# Patient Record
Sex: MALE | Race: WHITE | NOT HISPANIC OR LATINO | Employment: OTHER | URBAN - METROPOLITAN AREA
[De-identification: names, ages, dates, MRNs, and addresses within clinical notes are randomized per-mention and may not be internally consistent; named-entity substitution may affect disease eponyms.]

---

## 2021-04-23 ENCOUNTER — APPOINTMENT (EMERGENCY)
Dept: RADIOLOGY | Facility: HOSPITAL | Age: 51
End: 2021-04-23
Payer: COMMERCIAL

## 2021-04-23 ENCOUNTER — HOSPITAL ENCOUNTER (EMERGENCY)
Facility: HOSPITAL | Age: 51
Discharge: HOME | End: 2021-04-23
Attending: EMERGENCY MEDICINE | Admitting: EMERGENCY MEDICINE
Payer: COMMERCIAL

## 2021-04-23 VITALS
DIASTOLIC BLOOD PRESSURE: 86 MMHG | WEIGHT: 180 LBS | TEMPERATURE: 98.4 F | RESPIRATION RATE: 17 BRPM | HEIGHT: 66 IN | BODY MASS INDEX: 28.93 KG/M2 | OXYGEN SATURATION: 96 % | SYSTOLIC BLOOD PRESSURE: 144 MMHG | HEART RATE: 89 BPM

## 2021-04-23 DIAGNOSIS — I10 HYPERTENSION: ICD-10-CM

## 2021-04-23 DIAGNOSIS — R73.9 HYPERGLYCEMIA: Primary | ICD-10-CM

## 2021-04-23 DIAGNOSIS — IMO0002 UNCONTROLLED DIABETES MELLITUS: ICD-10-CM

## 2021-04-23 LAB
ALBUMIN SERPL BCP-MCNC: 4.1 G/DL (ref 3.4–4.8)
ALP SERPL-CCNC: 82.1 U/L (ref 10–129)
ALT SERPL W P-5'-P-CCNC: 42 U/L (ref 5–63)
ANION GAP SERPL CALCULATED.3IONS-SCNC: 6 MMOL/L (ref 4–13)
AST SERPL W P-5'-P-CCNC: 16 U/L (ref 15–41)
ATRIAL RATE: 90 BPM
BASE EX.OXY STD BLDV CALC-SCNC: 76.5 % (ref 60–80)
BASE EXCESS BLDV CALC-SCNC: 0.4 MMOL/L
BETA-HYDROXYBUTYRATE: 0.1 MMOL/L
BILIRUB SERPL-MCNC: 0.42 MG/DL (ref 0.3–1.2)
BILIRUB UR QL STRIP: NEGATIVE
BUN SERPL-MCNC: 17 MG/DL (ref 6–20)
CALCIUM SERPL-MCNC: 9.1 MG/DL (ref 8.4–10.2)
CHLORIDE SERPL-SCNC: 100 MMOL/L (ref 96–108)
CLARITY UR: CLEAR
CO2 SERPL-SCNC: 28 MMOL/L (ref 22–33)
COLOR UR: YELLOW
CREAT SERPL-MCNC: 0.76 MG/DL (ref 0.5–1.2)
ERYTHROCYTE [DISTWIDTH] IN BLOOD BY AUTOMATED COUNT: 12.5 % (ref 11.6–15.1)
GFR SERPL CREATININE-BSD FRML MDRD: 105 ML/MIN/1.73SQ M
GLUCOSE SERPL-MCNC: 144 MG/DL (ref 65–140)
GLUCOSE SERPL-MCNC: 323 MG/DL (ref 65–140)
GLUCOSE SERPL-MCNC: 334 MG/DL (ref 65–140)
GLUCOSE UR STRIP-MCNC: ABNORMAL MG/DL
HCO3 BLDV-SCNC: 25.9 MMOL/L (ref 24–30)
HCT VFR BLD AUTO: 49.8 % (ref 36.5–49.3)
HGB BLD-MCNC: 17.7 G/DL (ref 12–17)
HGB UR QL STRIP.AUTO: NEGATIVE
KETONES UR STRIP-MCNC: NEGATIVE MG/DL
LEUKOCYTE ESTERASE UR QL STRIP: NEGATIVE
MAGNESIUM SERPL-MCNC: 1.8 MG/DL (ref 1.6–2.6)
MCH RBC QN AUTO: 30.5 PG (ref 26.8–34.3)
MCHC RBC AUTO-ENTMCNC: 35.5 G/DL (ref 31.4–37.4)
MCV RBC AUTO: 86 FL (ref 82–98)
NITRITE UR QL STRIP: NEGATIVE
O2 CT BLDV-SCNC: 19.2 ML/DL
P AXIS: 55 DEGREES
PCO2 BLDV: 44.4 MM HG (ref 42–50)
PH BLDV: 7.38 [PH] (ref 7.3–7.4)
PH UR STRIP.AUTO: 6.5 [PH]
PLATELET # BLD AUTO: 164 THOUSANDS/UL (ref 149–390)
PMV BLD AUTO: 9.7 FL (ref 8.9–12.7)
PO2 BLDV: 42.1 MM HG (ref 35–45)
POTASSIUM SERPL-SCNC: 4.2 MMOL/L (ref 3.5–5)
PR INTERVAL: 168 MS
PROT SERPL-MCNC: 6.3 G/DL (ref 6.4–8.3)
PROT UR STRIP-MCNC: NEGATIVE MG/DL
QRS AXIS: 48 DEGREES
QRSD INTERVAL: 85 MS
QT INTERVAL: 354 MS
QTC INTERVAL: 433 MS
RBC # BLD AUTO: 5.8 MILLION/UL (ref 3.88–5.62)
SODIUM SERPL-SCNC: 134 MMOL/L (ref 133–145)
SP GR UR STRIP.AUTO: 1.02 (ref 1–1.03)
T WAVE AXIS: 76 DEGREES
TROPONIN I SERPL-MCNC: <0.03 NG/ML (ref 0–0.07)
UROBILINOGEN UR QL STRIP.AUTO: 0.2 E.U./DL
VENTRICULAR RATE: 90 BPM
WBC # BLD AUTO: 6.91 THOUSAND/UL (ref 4.31–10.16)

## 2021-04-23 PROCEDURE — 83036 HEMOGLOBIN GLYCOSYLATED A1C: CPT | Performed by: PHYSICIAN ASSISTANT

## 2021-04-23 PROCEDURE — 81003 URINALYSIS AUTO W/O SCOPE: CPT | Performed by: PHYSICIAN ASSISTANT

## 2021-04-23 PROCEDURE — 96361 HYDRATE IV INFUSION ADD-ON: CPT

## 2021-04-23 PROCEDURE — 85027 COMPLETE CBC AUTOMATED: CPT | Performed by: PHYSICIAN ASSISTANT

## 2021-04-23 PROCEDURE — 99285 EMERGENCY DEPT VISIT HI MDM: CPT | Performed by: PHYSICIAN ASSISTANT

## 2021-04-23 PROCEDURE — 36415 COLL VENOUS BLD VENIPUNCTURE: CPT | Performed by: PHYSICIAN ASSISTANT

## 2021-04-23 PROCEDURE — 84484 ASSAY OF TROPONIN QUANT: CPT | Performed by: PHYSICIAN ASSISTANT

## 2021-04-23 PROCEDURE — 82948 REAGENT STRIP/BLOOD GLUCOSE: CPT

## 2021-04-23 PROCEDURE — 83735 ASSAY OF MAGNESIUM: CPT | Performed by: PHYSICIAN ASSISTANT

## 2021-04-23 PROCEDURE — 82805 BLOOD GASES W/O2 SATURATION: CPT | Performed by: PHYSICIAN ASSISTANT

## 2021-04-23 PROCEDURE — 99285 EMERGENCY DEPT VISIT HI MDM: CPT

## 2021-04-23 PROCEDURE — 82010 KETONE BODYS QUAN: CPT | Performed by: PHYSICIAN ASSISTANT

## 2021-04-23 PROCEDURE — 93005 ELECTROCARDIOGRAM TRACING: CPT

## 2021-04-23 PROCEDURE — 80053 COMPREHEN METABOLIC PANEL: CPT | Performed by: PHYSICIAN ASSISTANT

## 2021-04-23 PROCEDURE — 93010 ELECTROCARDIOGRAM REPORT: CPT | Performed by: INTERNAL MEDICINE

## 2021-04-23 PROCEDURE — 71045 X-RAY EXAM CHEST 1 VIEW: CPT

## 2021-04-23 PROCEDURE — 96374 THER/PROPH/DIAG INJ IV PUSH: CPT

## 2021-04-23 RX ORDER — SODIUM CHLORIDE 9 MG/ML
500 INJECTION, SOLUTION INTRAVENOUS CONTINUOUS
Status: DISCONTINUED | OUTPATIENT
Start: 2021-04-23 | End: 2021-04-23 | Stop reason: HOSPADM

## 2021-04-23 RX ORDER — SODIUM CHLORIDE 9 MG/ML
2000 INJECTION, SOLUTION INTRAVENOUS CONTINUOUS
Status: DISPENSED | OUTPATIENT
Start: 2021-04-23 | End: 2021-04-23

## 2021-04-23 RX ORDER — SODIUM CHLORIDE 9 MG/ML
3 INJECTION INTRAVENOUS
Status: DISCONTINUED | OUTPATIENT
Start: 2021-04-23 | End: 2021-04-23 | Stop reason: HOSPADM

## 2021-04-23 RX ORDER — SODIUM CHLORIDE 9 MG/ML
250 INJECTION, SOLUTION INTRAVENOUS CONTINUOUS
Status: DISCONTINUED | OUTPATIENT
Start: 2021-04-23 | End: 2021-04-23 | Stop reason: HOSPADM

## 2021-04-23 RX ORDER — LOSARTAN POTASSIUM 25 MG/1
25 TABLET ORAL DAILY
Qty: 30 TABLET | Refills: 0 | Status: SHIPPED | OUTPATIENT
Start: 2021-04-23 | End: 2021-05-23

## 2021-04-23 RX ADMIN — INSULIN HUMAN 6 UNITS: 100 INJECTION, SOLUTION PARENTERAL at 15:29

## 2021-04-23 RX ADMIN — SODIUM CHLORIDE 2000 ML/HR: 0.9 INJECTION, SOLUTION INTRAVENOUS at 15:26

## 2021-04-23 NOTE — ED PROVIDER NOTES
History  Chief Complaint   Patient presents with    Hyperglycemia - Symptomatic     Patient presents with hyperglycemia, states his blood sugar was over 500 when he checked it at 2pm today, complaining of blurred vision and dry mouth  Patient no longer takes oral medication for diabetes at this time, only checked his blood sugar due to the symptoms     62-year-old male comes in today complaining of hyperglycemia  He reports that he has not taken any of his medication in years  For the past 3 days or so he has been feeling short of breath  He has been having polyuria, polydipsia, and occasional blurred vision  He has been having burning in his toes  He feels like his mouth is very dry  He has a history of diabetes, AFib, hypertension, prior stroke  Denies any history DVT  He smokes less than a pack per day x20 years          None       Past Medical History:   Diagnosis Date    A-fib ([facility] )     Diabetes mellitus ([facility] )     Hypertension     Stroke [facility])        Past Surgical History:   Procedure Laterality Date    APPENDECTOMY      CHOLECYSTECTOMY         History reviewed  No pertinent family history  I have reviewed and agree with the history as documented  E-Cigarette/Vaping    E-Cigarette Use Never User      E-Cigarette/Vaping Substances     Social History     Tobacco Use    Smoking status: Current Every Day Smoker     Packs/day: 0 25    Smokeless tobacco: Never Used   Substance Use Topics    Alcohol use: Yes     Frequency: Monthly or less     Drinks per session: 1 or 2    Drug use: Never       Review of Systems   Constitutional: Negative for fatigue and fever  HENT: Negative for ear pain, rhinorrhea and sore throat  Eyes: Negative for visual disturbance  Respiratory: Positive for shortness of breath  Negative for cough  Cardiovascular: Negative for chest pain  Gastrointestinal: Negative for abdominal pain, diarrhea, nausea and vomiting     Endocrine: Positive for polydipsia and polyuria  Genitourinary: Negative for dysuria  Musculoskeletal: Negative for back pain  Skin: Negative for rash  Neurological: Negative for headaches  Psychiatric/Behavioral: Negative for behavioral problems  Physical Exam  Physical Exam  Constitutional:       Appearance: Normal appearance  HENT:      Head: Normocephalic and atraumatic  Nose: No rhinorrhea  Mouth/Throat:      Mouth: Mucous membranes are moist    Eyes:      Extraocular Movements: Extraocular movements intact  Conjunctiva/sclera:      Right eye: Right conjunctiva is injected  Left eye: Left conjunctiva is injected  Neck:      Musculoskeletal: Normal range of motion  Cardiovascular:      Rate and Rhythm: Regular rhythm  Tachycardia present  Pulmonary:      Effort: Pulmonary effort is normal       Breath sounds: Examination of the right-upper field reveals wheezing  Examination of the right-middle field reveals wheezing  Examination of the right-lower field reveals wheezing and rhonchi  Wheezing and rhonchi present  Abdominal:      General: Abdomen is protuberant  Bowel sounds are normal       Palpations: Abdomen is soft  Musculoskeletal: Normal range of motion  Skin:     General: Skin is warm and dry  Neurological:      General: No focal deficit present  Mental Status: He is alert     Psychiatric:         Mood and Affect: Mood normal          Behavior: Behavior normal          Vital Signs  ED Triage Vitals [04/23/21 1503]   Temperature Pulse Respirations Blood Pressure SpO2   98 4 °F (36 9 °C) 101 18 155/100 98 %      Temp Source Heart Rate Source Patient Position - Orthostatic VS BP Location FiO2 (%)   Oral Monitor Lying Left arm --      Pain Score       --           Vitals:    04/23/21 1503 04/23/21 1607   BP: 155/100 144/86   Pulse: 101 89   Patient Position - Orthostatic VS: Lying Lying         Visual Acuity      ED Medications  Medications   sodium chloride (PF) 0 9 % injection 3 mL (has no administration in time range)   sodium chloride 0 9 % infusion (2,000 mL/hr Intravenous New Bag 4/23/21 1526)     Followed by   sodium chloride 0 9 % infusion (has no administration in time range)     Followed by   sodium chloride 0 9 % infusion (has no administration in time range)   insulin regular (HumuLIN R,NovoLIN R) injection 6 Units (6 Units Intravenous Given 4/23/21 1529)       Diagnostic Studies  Results Reviewed     Procedure Component Value Units Date/Time    Fingerstick Glucose (POCT) [[id number]]  (Abnormal) Collected: 04/23/21 1632    Lab Status: Final result Updated: 04/23/21 1634     POC Glucose 144 mg/dl     UA w Reflex to Microscopic w Reflex to Culture [[id number]]  (Abnormal) Collected: 04/23/21 1537    Lab Status: Final result Specimen: Urine, Clean Catch Updated: 04/23/21 1557     Color, UA Yellow     Clarity, UA Clear     Specific Mc[place], UA 1 020     pH, UA 6 5     Leukocytes, UA Negative     Nitrite, UA Negative     Protein, UA Negative mg/dl      Glucose, UA 3+ mg/dl      Ketones, UA Negative mg/dl      Urobilinogen, UA 0 2 E U /dl      Bilirubin, UA Negative     Blood, UA Negative    Troponin I [[id number]]  (Normal) Collected: 04/23/21 1523    Lab Status: Final result Specimen: Blood from Arm, Right Updated: 04/23/21 1554     Troponin I <0 03 ng/mL     Comprehensive metabolic panel [[id number]]  (Abnormal) Collected: 04/23/21 1523    Lab Status: Final result Specimen: Blood from Arm, Right Updated: 04/23/21 1552     Sodium 134 mmol/L      Potassium 4 2 mmol/L      Chloride 100 mmol/L      CO2 28 mmol/L      ANION GAP 6 mmol/L      BUN 17 mg/dL      Creatinine 0 76 mg/dL      Glucose 334 mg/dL      Calcium 9 1 mg/dL      AST 16 U/L      ALT 42 U/L      Alkaline Phosphatase 82 1 U/L      Total Protein 6 3 g/dL      Albumin 4 1 g/dL      Total Bilirubin 0 42 mg/dL      eGFR 105 ml/min/1 73sq m     Narrative:      [place] guidelines for Chronic Kidney Disease (CKD):   Stage 1 with normal or high GFR (GFR > 90 mL/min/1 73 square meters)    Stage 2 Mild CKD (GFR = 60-89 mL/min/1 73 square meters)    Stage 3A Moderate CKD (GFR = 45-59 mL/min/1 73 square meters)    Stage 3B Moderate CKD (GFR = 30-44 mL/min/1 73 square meters)    Stage 4 Severe CKD (GFR = 15-29 mL/min/1 73 square meters)    Stage 5 End Stage CKD (GFR <15 mL/min/1 73 square meters)  Note: GFR calculation is accurate only with a steady state creatinine    Magnesium [[id number]]  (Normal) Collected: 04/23/21 1523    Lab Status: Final result Specimen: Blood from Arm, Right Updated: 04/23/21 1552     Magnesium 1 8 mg/dL     Beta Hydroxybutyrate [[id number]]  (Normal) Collected: 04/23/21 1523    Lab Status: Final result Specimen: Blood from Arm, Right Updated: 04/23/21 1538     BETA-HYDROXYBUTYRATE 0 1 mmol/L     Blood gas, venous [[id number]] Collected: 04/23/21 1523    Lab Status: Final result Specimen: Blood from Arm, Right Updated: 04/23/21 1532     pH, Hector 7 383     pCO2, Hector 44 4 mm Hg      pO2, Hector 42 1 mm Hg      HCO3, Hector 25 9 mmol/L      Base Excess, Hector 0 4 mmol/L      O2 Content, Hector 19 2 ml/dL      O2 HGB, VENOUS 76 5 %     CBC [[id number]]  (Abnormal) Collected: 04/23/21 1523    Lab Status: Final result Specimen: Blood from Arm, Right Updated: 04/23/21 1531     WBC 6 91 Thousand/uL      RBC 5 80 Million/uL      Hemoglobin 17 7 g/dL      Hematocrit 49 8 %      MCV 86 fL      MCH 30 5 pg      MCHC 35 5 g/dL      RDW 12 5 %      Platelets 821 Thousands/uL      MPV 9 7 fL     Hemoglobin A1c w/EAG Estimation [[id number]] Collected: 04/23/21 1523    Lab Status: In process Specimen: Blood from Arm, Right Updated: 04/23/21 1529    Fingerstick Glucose (POCT) [[id number]]  (Abnormal) Collected: 04/23/21 1507    Lab Status: Final result Updated: 04/23/21 1509     POC Glucose 323 mg/dl                  XR chest 1 view portable   Final Result by Hamzah Fairbanks MD (04/23 6042)      No acute cardiopulmonary disease  Workstation performed: [id number]                    Procedures  Procedures         ED Course  ED Course as of Apr 23 1645   Fri Apr 23, 2021   1609 Speaking with Dr Thomas Lawler  Metformin 500 BID, losartan 25 mg daily  SBIRT 20yo+      Most Recent Value   SBIRT (22 yo +)   In order to provide better care to our patients, we are screening all of our patients for alcohol and drug use  Would it be okay to ask you these screening questions? No Filed at: 04/23/2021 1533                    MDM  Number of Diagnoses or Management Options  Hyperglycemia:   Hypertension:   Uncontrolled diabetes mellitus [facility]):   Diagnosis management comments: While patient's home glucometer read greater than 500, patient's blood sugar here with in the 300s  There are no signs of diabetic ketoacidosis  His hypertension is not too poorly controlled  Patient will be started on oral medications and advised to follow up with the clinic  Patient advised to return if anything worsens      Disposition  Final diagnoses:   Hyperglycemia   Uncontrolled diabetes mellitus ([address] )   Hypertension     Time reflects when diagnosis was documented in both MDM as applicable and the Disposition within this note     Time User Action Codes Description Comment    4/23/2021  4:10 PM Neo Haynes Add [R73 9] Hyperglycemia     4/23/2021  4:11 PM Neo Haynes Add [E11 65] Uncontrolled diabetes mellitus ([address] )     4/23/2021  4:11 PM Neo Haynes Add [I10] Hypertension       ED Disposition     ED Disposition Condition Date/Time Comment    Discharge Stable Fri Apr 23, 2021  4:10 PM Aman Rocha discharge to home/self care              Follow-up Information     Follow up With Specialties Details Why Contact Info [facility] Schedule an appointment as soon as possible for a visit   Michael Butcher [id number] [phone] [place], [place], [address]  Call  As needed [phone]             Patient's Medications   Discharge Prescriptions    LOSARTAN (COZAAR) 25 MG TABLET    Take 1 tablet (25 mg total) by mouth daily       Start Date: 4/23/2021 End Date: 5/23/2021       Order Dose: 25 mg       Quantity: 30 tablet    Refills: 0    METFORMIN (GLUCOPHAGE) 500 MG TABLET    Take 1 tablet (500 mg total) by mouth 2 (two) times a day with meals       Start Date: 4/23/2021 End Date: --       Order Dose: 500 mg       Quantity: 60 tablet    Refills: 0     No discharge procedures on file      PDMP Review     None          ED Provider  Electronically Signed by           Angelita Perez PA-C  04/23/21 3425

## 2021-04-23 NOTE — DISCHARGE INSTRUCTIONS
Return to the emergency department for any new worsening or concerning symptoms within the next 1-2 days  Please call your primary care physician to make a follow-up appointment within the next 1-2 business days  If you do not have 1, the name of 1 has been provided to you  You can also try calling the [facility] phone number to get a new physician

## 2021-04-24 LAB
EST. AVERAGE GLUCOSE BLD GHB EST-MCNC: 263 MG/DL
HBA1C MFR BLD: 10.8 %

## 2021-06-10 ENCOUNTER — APPOINTMENT (EMERGENCY)
Dept: RADIOLOGY | Facility: HOSPITAL | Age: 51
DRG: 389 | End: 2021-06-10
Payer: COMMERCIAL

## 2021-06-10 ENCOUNTER — APPOINTMENT (EMERGENCY)
Dept: CT IMAGING | Facility: HOSPITAL | Age: 51
DRG: 389 | End: 2021-06-10
Payer: COMMERCIAL

## 2021-06-10 ENCOUNTER — HOSPITAL ENCOUNTER (INPATIENT)
Facility: HOSPITAL | Age: 51
LOS: 3 days | DRG: 389 | End: 2021-06-14
Attending: INTERNAL MEDICINE | Admitting: INTERNAL MEDICINE
Payer: COMMERCIAL

## 2021-06-10 DIAGNOSIS — K56.609 SBO (SMALL BOWEL OBSTRUCTION) (HCC): ICD-10-CM

## 2021-06-10 DIAGNOSIS — R10.84 GENERALIZED ABDOMINAL PAIN: Primary | ICD-10-CM

## 2021-06-10 DIAGNOSIS — E11.65 HYPERGLYCEMIA DUE TO DIABETES MELLITUS (HCC): ICD-10-CM

## 2021-06-10 PROBLEM — Z86.73 HISTORY OF CVA (CEREBROVASCULAR ACCIDENT): Status: ACTIVE | Noted: 2021-06-10

## 2021-06-10 PROBLEM — I48.0 PAROXYSMAL ATRIAL FIBRILLATION (HCC): Status: ACTIVE | Noted: 2021-06-10

## 2021-06-10 PROBLEM — E11.9 TYPE 2 DIABETES MELLITUS, WITHOUT LONG-TERM CURRENT USE OF INSULIN (HCC): Status: ACTIVE | Noted: 2021-06-10

## 2021-06-10 PROBLEM — K56.600 PARTIAL INTESTINAL OBSTRUCTION (HCC): Status: ACTIVE | Noted: 2021-06-10

## 2021-06-10 PROBLEM — Z72.0 NICOTINE ABUSE: Status: ACTIVE | Noted: 2021-06-10

## 2021-06-10 LAB
ALBUMIN SERPL BCP-MCNC: 4.4 G/DL (ref 3.4–4.8)
ALP SERPL-CCNC: 84.2 U/L (ref 10–129)
ALT SERPL W P-5'-P-CCNC: 44 U/L (ref 5–63)
ANION GAP SERPL CALCULATED.3IONS-SCNC: 9 MMOL/L (ref 4–13)
AST SERPL W P-5'-P-CCNC: 19 U/L (ref 15–41)
ATRIAL RATE: 105 BPM
BACTERIA UR QL AUTO: NORMAL /HPF
BASOPHILS # BLD AUTO: 0.02 THOUSANDS/ΜL (ref 0–0.1)
BASOPHILS NFR BLD AUTO: 0 % (ref 0–1)
BILIRUB SERPL-MCNC: 0.51 MG/DL (ref 0.3–1.2)
BILIRUB UR QL STRIP: NEGATIVE
BUN SERPL-MCNC: 16 MG/DL (ref 6–20)
CALCIUM SERPL-MCNC: 9.3 MG/DL (ref 8.4–10.2)
CHLORIDE SERPL-SCNC: 100 MMOL/L (ref 96–108)
CLARITY UR: CLEAR
CO2 SERPL-SCNC: 27 MMOL/L (ref 22–33)
COLOR UR: YELLOW
CREAT SERPL-MCNC: 0.69 MG/DL (ref 0.5–1.2)
EOSINOPHIL # BLD AUTO: 0.11 THOUSAND/ΜL (ref 0–0.61)
EOSINOPHIL NFR BLD AUTO: 1 % (ref 0–6)
ERYTHROCYTE [DISTWIDTH] IN BLOOD BY AUTOMATED COUNT: 12.5 % (ref 11.6–15.1)
GFR SERPL CREATININE-BSD FRML MDRD: 109 ML/MIN/1.73SQ M
GLUCOSE SERPL-MCNC: 144 MG/DL (ref 65–140)
GLUCOSE SERPL-MCNC: 170 MG/DL (ref 65–140)
GLUCOSE SERPL-MCNC: 189 MG/DL (ref 65–140)
GLUCOSE SERPL-MCNC: 301 MG/DL (ref 65–140)
GLUCOSE UR STRIP-MCNC: ABNORMAL MG/DL
HCT VFR BLD AUTO: 50.9 % (ref 36.5–49.3)
HGB BLD-MCNC: 18.2 G/DL (ref 12–17)
HGB UR QL STRIP.AUTO: ABNORMAL
IMM GRANULOCYTES # BLD AUTO: 0.03 THOUSAND/UL (ref 0–0.2)
IMM GRANULOCYTES NFR BLD AUTO: 0 % (ref 0–2)
KETONES UR STRIP-MCNC: NEGATIVE MG/DL
LACTATE SERPL-SCNC: 1.7 MMOL/L (ref 0–2)
LEUKOCYTE ESTERASE UR QL STRIP: NEGATIVE
LIPASE SERPL-CCNC: 52 U/L (ref 13–60)
LYMPHOCYTES # BLD AUTO: 2.24 THOUSANDS/ΜL (ref 0.6–4.47)
LYMPHOCYTES NFR BLD AUTO: 27 % (ref 14–44)
MCH RBC QN AUTO: 30.3 PG (ref 26.8–34.3)
MCHC RBC AUTO-ENTMCNC: 35.8 G/DL (ref 31.4–37.4)
MCV RBC AUTO: 85 FL (ref 82–98)
MONOCYTES # BLD AUTO: 0.71 THOUSAND/ΜL (ref 0.17–1.22)
MONOCYTES NFR BLD AUTO: 9 % (ref 4–12)
NEUTROPHILS # BLD AUTO: 5.12 THOUSANDS/ΜL (ref 1.85–7.62)
NEUTS SEG NFR BLD AUTO: 63 % (ref 43–75)
NITRITE UR QL STRIP: NEGATIVE
NON-SQ EPI CELLS URNS QL MICRO: NORMAL /HPF
P AXIS: 68 DEGREES
PH UR STRIP.AUTO: 5.5 [PH]
PLATELET # BLD AUTO: 187 THOUSANDS/UL (ref 149–390)
PMV BLD AUTO: 9.9 FL (ref 8.9–12.7)
POTASSIUM SERPL-SCNC: 4 MMOL/L (ref 3.5–5)
PR INTERVAL: 150 MS
PROT SERPL-MCNC: 6.9 G/DL (ref 6.4–8.3)
PROT UR STRIP-MCNC: ABNORMAL MG/DL
QRS AXIS: 57 DEGREES
QRSD INTERVAL: 86 MS
QT INTERVAL: 334 MS
QTC INTERVAL: 442 MS
RBC # BLD AUTO: 6 MILLION/UL (ref 3.88–5.62)
RBC #/AREA URNS AUTO: NORMAL /HPF
SODIUM SERPL-SCNC: 136 MMOL/L (ref 133–145)
SP GR UR STRIP.AUTO: >=1.03 (ref 1–1.03)
T WAVE AXIS: 70 DEGREES
TROPONIN I SERPL-MCNC: <0.03 NG/ML (ref 0–0.07)
UROBILINOGEN UR QL STRIP.AUTO: 0.2 E.U./DL
VENTRICULAR RATE: 105 BPM
WBC # BLD AUTO: 8.23 THOUSAND/UL (ref 4.31–10.16)
WBC #/AREA URNS AUTO: NORMAL /HPF

## 2021-06-10 PROCEDURE — 83690 ASSAY OF LIPASE: CPT | Performed by: PHYSICIAN ASSISTANT

## 2021-06-10 PROCEDURE — 99285 EMERGENCY DEPT VISIT HI MDM: CPT

## 2021-06-10 PROCEDURE — 96374 THER/PROPH/DIAG INJ IV PUSH: CPT

## 2021-06-10 PROCEDURE — 84484 ASSAY OF TROPONIN QUANT: CPT | Performed by: PHYSICIAN ASSISTANT

## 2021-06-10 PROCEDURE — 80053 COMPREHEN METABOLIC PANEL: CPT | Performed by: PHYSICIAN ASSISTANT

## 2021-06-10 PROCEDURE — 81001 URINALYSIS AUTO W/SCOPE: CPT | Performed by: PHYSICIAN ASSISTANT

## 2021-06-10 PROCEDURE — 82948 REAGENT STRIP/BLOOD GLUCOSE: CPT

## 2021-06-10 PROCEDURE — 83605 ASSAY OF LACTIC ACID: CPT | Performed by: PHYSICIAN ASSISTANT

## 2021-06-10 PROCEDURE — 96361 HYDRATE IV INFUSION ADD-ON: CPT

## 2021-06-10 PROCEDURE — G1004 CDSM NDSC: HCPCS

## 2021-06-10 PROCEDURE — 93005 ELECTROCARDIOGRAM TRACING: CPT

## 2021-06-10 PROCEDURE — 85025 COMPLETE CBC W/AUTO DIFF WBC: CPT | Performed by: PHYSICIAN ASSISTANT

## 2021-06-10 PROCEDURE — 36415 COLL VENOUS BLD VENIPUNCTURE: CPT | Performed by: PHYSICIAN ASSISTANT

## 2021-06-10 PROCEDURE — 99285 EMERGENCY DEPT VISIT HI MDM: CPT | Performed by: PHYSICIAN ASSISTANT

## 2021-06-10 PROCEDURE — 74177 CT ABD & PELVIS W/CONTRAST: CPT

## 2021-06-10 PROCEDURE — 96375 TX/PRO/DX INJ NEW DRUG ADDON: CPT

## 2021-06-10 PROCEDURE — 99219 PR INITIAL OBSERVATION CARE/DAY 50 MINUTES: CPT | Performed by: NURSE PRACTITIONER

## 2021-06-10 PROCEDURE — 93010 ELECTROCARDIOGRAM REPORT: CPT | Performed by: INTERNAL MEDICINE

## 2021-06-10 RX ORDER — INSULIN GLARGINE 100 [IU]/ML
8 INJECTION, SOLUTION SUBCUTANEOUS
Status: DISCONTINUED | OUTPATIENT
Start: 2021-06-10 | End: 2021-06-10

## 2021-06-10 RX ORDER — ONDANSETRON 2 MG/ML
4 INJECTION INTRAMUSCULAR; INTRAVENOUS EVERY 6 HOURS PRN
Status: DISCONTINUED | OUTPATIENT
Start: 2021-06-10 | End: 2021-06-14 | Stop reason: HOSPADM

## 2021-06-10 RX ORDER — KETOROLAC TROMETHAMINE 30 MG/ML
15 INJECTION, SOLUTION INTRAMUSCULAR; INTRAVENOUS EVERY 6 HOURS PRN
Status: DISPENSED | OUTPATIENT
Start: 2021-06-10 | End: 2021-06-12

## 2021-06-10 RX ORDER — HYDROMORPHONE HCL/PF 1 MG/ML
0.5 SYRINGE (ML) INJECTION ONCE
Status: COMPLETED | OUTPATIENT
Start: 2021-06-10 | End: 2021-06-10

## 2021-06-10 RX ORDER — INSULIN GLARGINE 100 [IU]/ML
5 INJECTION, SOLUTION SUBCUTANEOUS
Status: DISCONTINUED | OUTPATIENT
Start: 2021-06-10 | End: 2021-06-14 | Stop reason: HOSPADM

## 2021-06-10 RX ORDER — SODIUM CHLORIDE, SODIUM LACTATE, POTASSIUM CHLORIDE, CALCIUM CHLORIDE 600; 310; 30; 20 MG/100ML; MG/100ML; MG/100ML; MG/100ML
100 INJECTION, SOLUTION INTRAVENOUS CONTINUOUS
Status: DISCONTINUED | OUTPATIENT
Start: 2021-06-10 | End: 2021-06-14 | Stop reason: HOSPADM

## 2021-06-10 RX ORDER — HYDROMORPHONE HCL/PF 1 MG/ML
0.2 SYRINGE (ML) INJECTION
Status: DISCONTINUED | OUTPATIENT
Start: 2021-06-10 | End: 2021-06-11

## 2021-06-10 RX ORDER — HYDROMORPHONE HCL/PF 1 MG/ML
1 SYRINGE (ML) INJECTION ONCE
Status: COMPLETED | OUTPATIENT
Start: 2021-06-10 | End: 2021-06-10

## 2021-06-10 RX ORDER — LOSARTAN POTASSIUM 25 MG/1
25 TABLET ORAL DAILY
Status: DISCONTINUED | OUTPATIENT
Start: 2021-06-11 | End: 2021-06-14 | Stop reason: HOSPADM

## 2021-06-10 RX ORDER — ONDANSETRON 2 MG/ML
4 INJECTION INTRAMUSCULAR; INTRAVENOUS ONCE
Status: COMPLETED | OUTPATIENT
Start: 2021-06-10 | End: 2021-06-10

## 2021-06-10 RX ORDER — ACETAMINOPHEN 325 MG/1
650 TABLET ORAL EVERY 6 HOURS PRN
Status: DISCONTINUED | OUTPATIENT
Start: 2021-06-10 | End: 2021-06-14 | Stop reason: HOSPADM

## 2021-06-10 RX ORDER — NICOTINE 21 MG/24HR
1 PATCH, TRANSDERMAL 24 HOURS TRANSDERMAL DAILY
Status: DISCONTINUED | OUTPATIENT
Start: 2021-06-10 | End: 2021-06-14 | Stop reason: HOSPADM

## 2021-06-10 RX ORDER — MORPHINE SULFATE 4 MG/ML
4 INJECTION, SOLUTION INTRAMUSCULAR; INTRAVENOUS ONCE
Status: COMPLETED | OUTPATIENT
Start: 2021-06-10 | End: 2021-06-10

## 2021-06-10 RX ADMIN — SODIUM CHLORIDE 1000 ML: 0.9 INJECTION, SOLUTION INTRAVENOUS at 10:08

## 2021-06-10 RX ADMIN — ONDANSETRON 4 MG: 2 INJECTION INTRAMUSCULAR; INTRAVENOUS at 18:24

## 2021-06-10 RX ADMIN — INSULIN GLARGINE 5 UNITS: 100 INJECTION, SOLUTION SUBCUTANEOUS at 21:40

## 2021-06-10 RX ADMIN — HYDROMORPHONE HYDROCHLORIDE 0.5 MG: 1 INJECTION, SOLUTION INTRAMUSCULAR; INTRAVENOUS; SUBCUTANEOUS at 13:01

## 2021-06-10 RX ADMIN — HYDROMORPHONE HYDROCHLORIDE 0.2 MG: 1 INJECTION, SOLUTION INTRAMUSCULAR; INTRAVENOUS; SUBCUTANEOUS at 20:52

## 2021-06-10 RX ADMIN — MORPHINE SULFATE 4 MG: 4 INJECTION INTRAVENOUS at 10:09

## 2021-06-10 RX ADMIN — INSULIN LISPRO 1 UNITS: 100 INJECTION, SOLUTION INTRAVENOUS; SUBCUTANEOUS at 15:48

## 2021-06-10 RX ADMIN — HYDROMORPHONE HYDROCHLORIDE 1 MG: 1 INJECTION, SOLUTION INTRAMUSCULAR; INTRAVENOUS; SUBCUTANEOUS at 11:30

## 2021-06-10 RX ADMIN — INSULIN LISPRO 1 UNITS: 100 INJECTION, SOLUTION INTRAVENOUS; SUBCUTANEOUS at 18:11

## 2021-06-10 RX ADMIN — Medication 1 PATCH: at 15:39

## 2021-06-10 RX ADMIN — SODIUM CHLORIDE, SODIUM LACTATE, POTASSIUM CHLORIDE, AND CALCIUM CHLORIDE 100 ML/HR: .6; .31; .03; .02 INJECTION, SOLUTION INTRAVENOUS at 15:42

## 2021-06-10 RX ADMIN — HYDROMORPHONE HYDROCHLORIDE 0.2 MG: 1 INJECTION, SOLUTION INTRAMUSCULAR; INTRAVENOUS; SUBCUTANEOUS at 16:36

## 2021-06-10 RX ADMIN — KETOROLAC TROMETHAMINE 15 MG: 30 INJECTION, SOLUTION INTRAMUSCULAR at 15:21

## 2021-06-10 RX ADMIN — ENOXAPARIN SODIUM 40 MG: 40 INJECTION SUBCUTANEOUS at 15:38

## 2021-06-10 RX ADMIN — IOHEXOL 100 ML: 350 INJECTION, SOLUTION INTRAVENOUS at 11:16

## 2021-06-10 RX ADMIN — ONDANSETRON 4 MG: 2 INJECTION INTRAMUSCULAR; INTRAVENOUS at 10:08

## 2021-06-10 RX ADMIN — FAMOTIDINE 20 MG: 10 INJECTION, SOLUTION INTRAVENOUS at 20:43

## 2021-06-10 RX ADMIN — FAMOTIDINE 20 MG: 10 INJECTION, SOLUTION INTRAVENOUS at 15:39

## 2021-06-10 NOTE — PLAN OF CARE
Problem: Nutrition/Hydration-ADULT  Goal: Nutrient/Hydration intake appropriate for improving, restoring or maintaining nutritional needs  Description: Monitor and assess patient's nutrition/hydration status for malnutrition  Collaborate with interdisciplinary team and initiate plan and interventions as ordered  Monitor patient's weight and dietary intake as ordered or per policy  Utilize nutrition screening tool and intervene as necessary  Determine patient's food preferences and provide high-protein, high-caloric foods as appropriate       INTERVENTIONS:  - Monitor oral intake, urinary output, labs, and treatment plans  - Assess nutrition and hydration status and recommend course of action  - Evaluate amount of meals eaten  - Assist patient with eating if necessary   - Allow adequate time for meals  - Recommend/ encourage appropriate diets, oral nutritional supplements, and vitamin/mineral supplements  - Order, calculate, and assess calorie counts as needed  - Recommend, monitor, and adjust tube feedings and TPN/PPN based on assessed needs  - Assess need for intravenous fluids  - Provide specific nutrition/hydration education as appropriate  - Include patient/family/caregiver in decisions related to nutrition  Outcome: Progressing     Problem: GASTROINTESTINAL - ADULT  Goal: Minimal or absence of nausea and/or vomiting  Description: INTERVENTIONS:  - Administer IV fluids if ordered to ensure adequate hydration  - Maintain NPO status until nausea and vomiting are resolved  - Nasogastric tube if ordered  - Administer ordered antiemetic medications as needed  - Provide nonpharmacologic comfort measures as appropriate  - Advance diet as tolerated, if ordered  - Consider nutrition services referral to assist patient with adequate nutrition and appropriate food choices  Outcome: Progressing  Goal: Maintains or returns to baseline bowel function  Description: INTERVENTIONS:  - Assess bowel function  - Encourage oral fluids to ensure adequate hydration  - Administer IV fluids if ordered to ensure adequate hydration  - Administer ordered medications as needed  - Encourage mobilization and activity  - Consider nutritional services referral to assist patient with adequate nutrition and appropriate food choices  Outcome: Progressing  Goal: Maintains adequate nutritional intake  Description: INTERVENTIONS:  - Monitor percentage of each meal consumed  - Identify factors contributing to decreased intake, treat as appropriate  - Assist with meals as needed  - Monitor I&O, weight, and lab values if indicated  - Obtain nutrition services referral as needed  Outcome: Progressing  Goal: Establish and maintain optimal ostomy function  Description: INTERVENTIONS:  - Assess bowel function  - Encourage oral fluids to ensure adequate hydration  - Administer IV fluids if ordered to ensure adequate hydration   - Administer ordered medications as needed  - Encourage mobilization and activity  - Nutrition services referral to assist patient with appropriate food choices  - Assess stoma site  - Consider wound care consult   Outcome: Progressing     Problem: PAIN - ADULT  Goal: Verbalizes/displays adequate comfort level or baseline comfort level  Description: Interventions:  - Encourage patient to monitor pain and request assistance  - Assess pain using appropriate pain scale  - Administer analgesics based on type and severity of pain and evaluate response  - Implement non-pharmacological measures as appropriate and evaluate response  - Consider cultural and social influences on pain and pain management  - Notify physician/advanced practitioner if interventions unsuccessful or patient reports new pain  Outcome: Progressing     Problem: DISCHARGE PLANNING  Goal: Discharge to home or other facility with appropriate resources  Description: INTERVENTIONS:  - Identify barriers to discharge w/patient and caregiver  - Arrange for needed discharge resources and transportation as appropriate  - Identify discharge learning needs (meds, wound care, etc )  - Arrange for interpretive services to assist at discharge as needed  - Refer to Case Management Department for coordinating discharge planning if the patient needs post-hospital services based on physician/advanced practitioner order or complex needs related to functional status, cognitive ability, or social support system  Outcome: Progressing     Problem: Knowledge Deficit  Goal: Patient/family/caregiver demonstrates understanding of disease process, treatment plan, medications, and discharge instructions  Description: Complete learning assessment and assess knowledge base    Interventions:  - Provide teaching at level of understanding  - Provide teaching via preferred learning methods  Outcome: Progressing

## 2021-06-10 NOTE — H&P
Leslye U  65   H&P- [address] 1970, 45 y o  male MRN: [id number]  Unit/Bed#: -01 Encounter: [id number]  Primary Care Provider: No primary care provider on file  Date and time admitted to hospital: 6/10/2021  9:50 AM    * Small bowel obstruction [facility])  Assessment & Plan  Patient presents with abdominal pain nausea vomiting diarrhea for 3-4 days  CT abdomen pelvis with contrast showed - Mild proximal small bowel dilatation with questionable transition within the left midabdomen some degree of underlying low-grade obstruction not entirely excluded  History of bowel obstruction with lysis of adhesion about 4 years ago  Patient seen by surgery in ED, appreciate input  · Recommend NPO, hydration, follow-up abdominal x-ray in the morning  NPO  Pain control  IV hydration       Type 2 diabetes mellitus, without long-term current use of insulin (HCC)  Assessment & Plan  Lab Results   Component Value Date    HGBA1C 10 8 (H) 04/23/2021       No results for input(s): POCGLU in the last 72 hours  Blood Sugar Average: Last 72 hrs:   glucose 301  Patient is on metformin 500 mg p o  B i d  At home  Hold metformin  Start Lantus 8 units subcu HS  SSI  Repeat A1c    Paroxysmal atrial fibrillation [facility])  Assessment & Plan  Patient stopped taking medications for months  Recently moved to the area in November last year, does not have a PCP or cardiologist in area  Reports feeling palpitation about 2 times a day  EKG today showed sinus tach, rate 105  Place patient on telemetry  Optimize electrolytes  Recommend referral to cardiology on discharge        Hypertension  Assessment & Plan  Continue losartan with holding parameter  BP stable    Nicotine abuse  Assessment & Plan  Smoking cessation, nicotine patch    History of CVA (cerebrovascular accident)  Assessment & Plan  History of CVA x 2 in his 35s  Likely secondary to paroxysmal AFib  With resultant mild right-sided weakness     Stop taking Pradaxa for months  Not on statin at home  Patient does not have a PCP in the area  Advised patient to find a PCP ASAP post discharge  VTE Prophylaxis: Enoxaparin (Lovenox)  / reason for no mechanical VTE prophylaxis on lovenox   Code Status: full code  POLST: POLST form is not discussed and not completed at this time  Anticipated Length of Stay:  Patient will be admitted on an Observation basis with an anticipated length of stay of  < 2 midnights  Justification for Hospital Stay: SBO    Total Time for Visit, including Counseling / Coordination of Care: 45 minutes  Greater than 50% of this total time spent on direct patient counseling and coordination of care  Chief Complaint:   Abdominal pain nausea vomiting diarrhea for 3-4 days    History of Present Illness:    Rinku Abbott is a 45 y o  male with PMH of bowel obstruction, appendectomy, cholecystectomy, lysis of intestinal adhesions, paroxysmal atrial fibrillation, type 2 diabetes, hypertension, CVA, nicotine abuse, who presents with abdominal pain nausea vomiting diarrhea for 3- 4 days  Patient reports abdominal pain is diffuse, constant, mostly in upper abdomen, feeling bloated/heaviness in abdomen  Reports feeling sick every time after eating and then threw up  Vomited about 5-6 times in past 3-4 days,non bloody  Reports diarrhea 3-4 times per day at home, watery  Patient denies fever, chills, chest pain, headache, dizziness, cough, SOB at home  Reports history of bowel obstruction, underwent surgery about 4 years ago  Patient reports blood sugar being high at home,was 400 yesterday and urinating a lot  Patient reports stop taking medications for AFib for months  Review of Systems:    Review of Systems   Constitutional: Positive for appetite change  Cardiovascular: Positive for palpitations  History of AFib, not taking medications  Reports palpitation feelings about 2 times a day     Gastrointestinal: Positive for abdominal distention, abdominal pain, diarrhea, nausea and vomiting  All other systems reviewed and are negative  Past Medical and Surgical History:     Past Medical History:   Diagnosis Date    [facility]fib [facility]     Diabetes mellitus ([facility] )     Hypertension     Stroke ([facility] )     x 2 in mid 30's       Past Surgical History:   Procedure Laterality Date    ABDOMINAL SURGERY      APPENDECTOMY      CHOLECYSTECTOMY      LAPAROSCOPIC LYSIS INTESTINAL ADHESIONS         Meds/Allergies:    Prior to Admission medications    Medication Sig Start Date End Date Taking? Authorizing Provider   losartan (COZAAR) 25 mg tablet Take 1 tablet (25 mg total) by mouth daily 4/23/21 5/23/21  Sherron Prey Severino, PA-C   metFORMIN (GLUCOPHAGE) 500 mg tablet Take 1 tablet (500 mg total) by mouth 2 (two) times a day with meals 4/23/21   Gabriela Gil PA-C     I have reviewed home medications with patient personally      Allergies: No Known Allergies    Social History:     Marital Status: /Civil Union   Occupation: unknonw  Patient Pre-hospital Living Situation: lives with family  Patient Pre-hospital Level of Mobility:  Independent  Patient Pre-hospital Diet Restrictions:  Diabetic diet  Substance Use History:   Social History     Substance and Sexual Activity   Alcohol Use Not Currently    Frequency: Monthly or less    Drinks per session: 1 or 2     Social History     Tobacco Use   Smoking Status Current Every Day Smoker    Packs/day: 0 50    Types: Cigarettes   Smokeless Tobacco Never Used     Social History     Substance and Sexual Activity   Drug Use Never       Family History:    non-contributory    Physical Exam:     Vitals:   Blood Pressure: 138/88 (06/10/21 1341)  Pulse: 82 (06/10/21 1341)  Temperature: (!) 97 4 °F (36 3 °C) (06/10/21 1341)  Temp Source: Tympanic (06/10/21 1341)  Respirations: 16 (06/10/21 1341)  Height: 5' 7" (170 2 cm) (06/10/21 1341)  Weight - Scale: 83 4 kg (183 lb 13 8 oz) (06/10/21 1341)  SpO2: 99 % (06/10/21 1341)    Physical Exam  Vitals signs and nursing note reviewed  Constitutional:       Appearance: He is well-developed  HENT:      Head: Normocephalic and atraumatic  Neck:      Musculoskeletal: Neck supple  Thyroid: No thyromegaly  Vascular: No JVD  Trachea: No tracheal deviation  Cardiovascular:      Rate and Rhythm: Normal rate and regular rhythm  Heart sounds: Normal heart sounds  Pulmonary:      Effort: Pulmonary effort is normal  No respiratory distress  Breath sounds: No wheezing or rales  Comments: Diminished breath sounds bilateral lower lobes, O2 2 L, satting Well  Abdominal:      General: There is distension  Comments: Abdomen mildly distended, diffuse tenderness, diminished bowel sounds  Musculoskeletal: Normal range of motion  General: No swelling or deformity  Right lower leg: No edema  Left lower leg: No edema  Skin:     General: Skin is warm and dry  Neurological:      Mental Status: He is alert and oriented to person, place, and time  Comments: Follows commands  Psychiatric:         Mood and Affect: Mood normal          Judgment: Judgment normal          Additional Data:     Lab Results: I have personally reviewed pertinent reports  Results from last 7 days   Lab Units 06/10/21  1002   WBC Thousand/uL 8 23   HEMOGLOBIN g/dL 18 2*   HEMATOCRIT % 50 9*   PLATELETS Thousands/uL 187   NEUTROS PCT % 63   LYMPHS PCT % 27   MONOS PCT % 9   EOS PCT % 1     Results from last 7 days   Lab Units 06/10/21  1002   POTASSIUM mmol/L 4 0   CHLORIDE mmol/L 100   CO2 mmol/L 27   BUN mg/dL 16   CREATININE mg/dL 0 69   CALCIUM mg/dL 9 3   ALK PHOS U/L 84 2   ALT U/L 44   AST U/L 19           Imaging: I have personally reviewed pertinent reports        Ct Abdomen Pelvis With Contrast    Result Date: 6/10/2021  Narrative: CT ABDOMEN AND PELVIS WITH IV CONTRAST INDICATION:   abd pain, NVD, status post appy, cecy, history of bowel adhesions  COMPARISON:  None  TECHNIQUE:  CT examination of the abdomen and pelvis was performed  Axial, sagittal, and coronal 2D reformatted images were created from the source data and submitted for interpretation  Radiation dose length product (DLP) for this visit:   This examination, like all CT scans performed in the [facility], was performed utilizing techniques to minimize radiation dose exposure, including the use of iterative reconstruction  and automated exposure control  IV Contrast: Enteric Contrast:  Enteric contrast was not administered  FINDINGS: ABDOMEN LOWER CHEST:  No clinically significant abnormality identified in the visualized lower chest  LIVER/BILIARY TREE:  Unremarkable  GALLBLADDER:  Gallbladder is surgically absent  SPLEEN:  Unremarkable  PANCREAS:  Unremarkable  ADRENAL GLANDS:  Unremarkable  KIDNEYS/URETERS:  No hydronephrosis or urinary tract calculus  One or more sharply circumscribed subcentimeter renal hypodensities are present, too small to accurately characterize, and statistically most likely benign findings  According to recent literature (Radiology 2019) no further workup of these findings is recommended  STOMACH AND BOWEL:  Mild proximal small bowel distention is identified with questionable transition point within the left mid abdomen  APPENDIX:  No findings to suggest appendicitis  ABDOMINOPELVIC CAVITY:  No ascites  No pneumoperitoneum  No lymphadenopathy  VESSELS:  Unremarkable for patient's age  PELVIS REPRODUCTIVE ORGANS:  Unremarkable for patient's age  URINARY BLADDER:  Unremarkable  ABDOMINAL WALL/INGUINAL REGIONS:  Unremarkable  OSSEOUS STRUCTURES:  No acute fracture or destructive osseous lesion  Impression: Mild proximal small bowel dilatation with questionable transition within the left midabdomen some degree of underlying low-grade obstruction not entirely excluded   Workstation performed: [id number]       EKG, Pathology, and Other Studies Reviewed on Admission:   · EKG:  Sinus tach, rate 105    [employer] Records Reviewed: Yes     ** Please Note: Dragon 360 Dictation voice to text software may have been used in the creation of this document   **

## 2021-06-10 NOTE — ASSESSMENT & PLAN NOTE
History of CVA x 2 in his 35s  Likely secondary to paroxysmal AFib  With resultant mild right-sided weakness  Stop taking Pradaxa for months  Not on statin at home  Patient does not have a PCP in the area  Advised patient to find a PCP ASAP post discharge

## 2021-06-10 NOTE — ASSESSMENT & PLAN NOTE
Patient stopped taking medications for months  Recently moved to the area in November last year, does not have a PCP or cardiologist in area  Reports feeling palpitation about 2 times a day    EKG today showed sinus tach, rate 105  Place patient on telemetry  Optimize electrolytes  Recommend referral to cardiology on discharge

## 2021-06-10 NOTE — CONSULTS
Consult: This is a 53-year-old white male who presented to the [facility] this patient knows that this occurred on 07/03 days prior  gency room with nausea vomiting and abdominal pain  Concomitantly he has also had profuse watery diarrhea  This patient has had a previous cholecystectomy and appendectomy in the past   He has had 2 laparotomies for small bowel obstruction and lysis of adhe that occurs   His last laparotomy was 5 years ago  Upon admission is white blood cell count was 8 23 K, the hemoglobin was 18 2, hematocrit 50 9  CT scan was obtained and revealed findings consistent with a 1 mild small bowel obstruction with transition point in the left mid abdomen  Past medical history:  Diabetes mellitus, history of atrial fibrillation, status post a previous small bowel obstruction laparotomy 5 years ago, status post cholecystectomy, status post appendectomy    Allergies:  None    Medications:  Losartan 25 mg daily, metformin 500 mg daily      Physical exam:    Patient is awake alert orient x3    HEENT:  NINA EOMI, conjunctiva pink sclera clear    Neck:  Supple    Lungs:  Clear    Cor:  Regular rate and rhythm    :  Abdomen:  Mildly distended soft    Extremities:  Full range of motion    Neuro:  Grossly intact    Rectal:  Deferred    Impression:  Low-grade small-bowel obstruction    Plan:  Hydration, observation with a follow-up abdominal x-ray in the morning, will follow    Medicine to evaluate his polycythemia

## 2021-06-10 NOTE — ASSESSMENT & PLAN NOTE
Lab Results   Component Value Date    HGBA1C 10 8 (H) 04/23/2021       No results for input(s): POCGLU in the last 72 hours  Blood Sugar Average: Last 72 hrs:   glucose 301  Patient is on metformin 500 mg p o  B i d  At home    Hold metformin  Start Lantus 8 units subcu HS  SSI  Repeat A1c

## 2021-06-10 NOTE — ED PROVIDER NOTES
History  Chief Complaint   Patient presents with    Abdominal Pain     Pt reports abd pain, vomiting and diarrhea x 3 days  Pt with Past Medical History: A-fib, Diabetes mellitus, HTN, Stroke   Past Surgical History: ABDOMINAL SURGERY, APPENDECTOMY, CHOLECYSTECTOMY, LAPAROSCOPIC LYSIS INTESTINAL ADHESIONS  Presents to ED c/o 3 day h/o periumbilical abd pain, now generalized with nausea, multiple episodes of vomiting, few episodes of nonbloody diarrhea, some shortness of breath, no chest pain, no fever, no urinary symptoms, no rash, no joint pain or swelling for which he did not take any medication for  Unsure what blood sugars are  Prior to Admission Medications   Prescriptions Last Dose Informant Patient Reported? Taking?   losartan (COZAAR) 25 mg tablet   No No   Sig: Take 1 tablet (25 mg total) by mouth daily   metFORMIN (GLUCOPHAGE) 500 mg tablet   No No   Sig: Take 1 tablet (500 mg total) by mouth 2 (two) times a day with meals      Facility-Administered Medications: None       Past Medical History:   Diagnosis Date    A-fib ([facility] )     Diabetes mellitus ([facility] )     Hypertension     Stroke ([place] [address] )     x 2 in mid 30's       Past Surgical History:   Procedure Laterality Date    ABDOMINAL SURGERY      APPENDECTOMY      CHOLECYSTECTOMY      LAPAROSCOPIC LYSIS INTESTINAL ADHESIONS         History reviewed  No pertinent family history  I have reviewed and agree with the history as documented  E-Cigarette/Vaping    E-Cigarette Use Never User      E-Cigarette/Vaping Substances     Social History     Tobacco Use    Smoking status: Current Every Day Smoker     Packs/day: 0 50     Types: Cigarettes    Smokeless tobacco: Never Used   Substance Use Topics    Alcohol use: Not Currently     Frequency: Monthly or less     Drinks per session: 1 or 2    Drug use: Never       Review of Systems   Constitutional: Negative for chills and fever     HENT: Negative for ear pain, hearing loss, mouth sores, sore throat and trouble swallowing  Eyes: Negative for visual disturbance  Respiratory: Negative for cough and shortness of breath  Cardiovascular: Negative for chest pain and leg swelling  Gastrointestinal: Positive for abdominal pain, diarrhea, nausea and vomiting  Negative for constipation  Genitourinary: Negative for difficulty urinating, dysuria, flank pain, frequency, hematuria and penile pain  Musculoskeletal: Negative for arthralgias and myalgias  Skin: Negative for color change, pallor and rash  Neurological: Negative for dizziness, weakness and headaches  Psychiatric/Behavioral: Negative for behavioral problems  All other systems reviewed and are negative  Physical Exam  Physical Exam  Vitals signs and nursing note reviewed  Constitutional:       General: He is in acute distress  Appearance: He is well-developed  He is obese  HENT:      Head: Normocephalic and atraumatic  Right Ear: External ear normal       Left Ear: External ear normal       Nose: Nose normal       Mouth/Throat:      Mouth: Mucous membranes are moist       Pharynx: Oropharynx is clear  Eyes:      Conjunctiva/sclera: Conjunctivae normal    Neck:      Musculoskeletal: Normal range of motion  Cardiovascular:      Rate and Rhythm: Normal rate and regular rhythm  Pulmonary:      Effort: Pulmonary effort is normal  No respiratory distress  Breath sounds: Normal breath sounds  Abdominal:      General: Abdomen is protuberant  Bowel sounds are absent  Palpations: Abdomen is soft  Tenderness: There is generalized abdominal tenderness  There is guarding  There is no right CVA tenderness, left CVA tenderness or rebound  Hernia: No hernia is present  Genitourinary:     Comments: deferred  Musculoskeletal: Normal range of motion  Skin:     General: Skin is warm and dry  Neurological:      Mental Status: He is alert and oriented to person, place, and time     Psychiatric: Behavior: Behavior normal          Vital Signs  ED Triage Vitals   Temperature Pulse Respirations Blood Pressure SpO2   06/10/21 0946 06/10/21 0946 06/10/21 0946 06/10/21 0946 06/10/21 0946   98 4 °F (36 9 °C) (!) 111 18 (!) 149/107 95 %      Temp Source Heart Rate Source Patient Position - Orthostatic VS BP Location FiO2 (%)   06/10/21 0946 06/10/21 0946 06/10/21 1115 06/10/21 1115 --   Tympanic Monitor Sitting Left arm       Pain Score       06/10/21 0946       8           Vitals:    06/10/21 1115 06/10/21 1156 06/10/21 1253 06/10/21 1341   BP: 170/90 129/84 134/80 138/88   Pulse: 87 84 86 82   Patient Position - Orthostatic VS: Sitting   Lying         Visual Acuity      ED Medications  Medications   losartan (COZAAR) tablet 25 mg (has no administration in time range)   lactated ringers infusion (100 mL/hr Intravenous New Bag 6/10/21 1542)   acetaminophen (TYLENOL) tablet 650 mg (has no administration in time range)   ondansetron (ZOFRAN) injection 4 mg (has no administration in time range)   nicotine (NICODERM CQ) 14 mg/24hr TD 24 hr patch 1 patch (1 patch Transdermal Medication Applied 6/10/21 1539)   enoxaparin (LOVENOX) subcutaneous injection 40 mg (40 mg Subcutaneous Given 6/10/21 1538)   famotidine (PEPCID) injection 20 mg (20 mg Intravenous Given 6/10/21 1539)   ketorolac (TORADOL) injection 15 mg (15 mg Intravenous Given 6/10/21 1521)   HYDROmorphone (DILAUDID) injection 0 2 mg (0 2 mg Intravenous Given 6/10/21 1636)   insulin lispro (HumaLOG) 100 units/mL subcutaneous injection 1-5 Units (1 Units Subcutaneous Given 6/10/21 1548)   insulin glargine (LANTUS) subcutaneous injection 8 Units 0 08 mL (has no administration in time range)   sodium chloride 0 9 % bolus 1,000 mL (0 mL Intravenous Stopped 6/10/21 1258)   ondansetron (ZOFRAN) injection 4 mg (4 mg Intravenous Given 6/10/21 1008)   morphine (PF) 4 mg/mL injection 4 mg (4 mg Intravenous Given 6/10/21 1009)   iohexol (OMNIPAQUE) 350 MG/ML injection (SINGLE-DOSE) 100 mL (100 mL Intravenous Given 6/10/21 1116)   HYDROmorphone (DILAUDID) injection 1 mg (1 mg Intravenous Given 6/10/21 1130)   HYDROmorphone (DILAUDID) injection 0 5 mg (0 5 mg Intravenous Given 6/10/21 1301)       Diagnostic Studies  Results Reviewed     Procedure Component Value Units Date/Time    Urine Microscopic [[id number]]  (Normal) Collected: 06/10/21 1004    Lab Status: Final result Specimen: Urine, Clean Catch Updated: 06/10/21 1037     RBC, UA 1-2 /hpf      WBC, UA None Seen /hpf      Epithelial Cells None Seen /hpf      Bacteria, UA None Seen /hpf     Troponin I [[id number]]  (Normal) Collected: 06/10/21 1002    Lab Status: Final result Specimen: Blood from Arm, Right Updated: 06/10/21 1032     Troponin I <0 03 ng/mL     Lipase [[id number]]  (Normal) Collected: 06/10/21 1002    Lab Status: Final result Specimen: Blood from Arm, Right Updated: 06/10/21 1031     Lipase 52 u/L     Lactic acid [[id number]]  (Normal) Collected: 06/10/21 1002    Lab Status: Final result Specimen: Blood from Arm, Right Updated: 06/10/21 1031     LACTIC ACID 1 7 mmol/L     Narrative:      Result may be elevated if tourniquet was used during collection      Comprehensive metabolic panel [[id number]]  (Abnormal) Collected: 06/10/21 1002    Lab Status: Final result Specimen: Blood from Arm, Right Updated: 06/10/21 1031     Sodium 136 mmol/L      Potassium 4 0 mmol/L      Chloride 100 mmol/L      CO2 27 mmol/L      ANION GAP 9 mmol/L      BUN 16 mg/dL      Creatinine 0 69 mg/dL      Glucose 301 mg/dL      Calcium 9 3 mg/dL      AST 19 U/L      ALT 44 U/L      Alkaline Phosphatase 84 2 U/L      Total Protein 6 9 g/dL      Albumin 4 4 g/dL      Total Bilirubin 0 51 mg/dL      eGFR 109 ml/min/1 73sq m     Narrative:      Christine guidelines for Chronic Kidney Disease (CKD):     Stage 1 with normal or high GFR (GFR > 90 mL/min/1 73 square meters)    Stage 2 Mild CKD (GFR = 60-89 mL/min/1 73 square meters)    Stage 3A Moderate CKD (GFR = 45-59 mL/min/1 73 square meters)    Stage 3B Moderate CKD (GFR = 30-44 mL/min/1 73 square meters)    Stage 4 Severe CKD (GFR = 15-29 mL/min/1 73 square meters)    Stage 5 End Stage CKD (GFR <15 mL/min/1 73 square meters)  Note: GFR calculation is accurate only with a steady state creatinine    CBC and differential [[id number]]  (Abnormal) Collected: 06/10/21 1002    Lab Status: Final result Specimen: Blood from Arm, Right Updated: 06/10/21 1028     WBC 8 23 Thousand/uL      RBC 6 00 Million/uL      Hemoglobin 18 2 g/dL      Hematocrit 50 9 %      MCV 85 fL      MCH 30 3 pg      MCHC 35 8 g/dL      RDW 12 5 %      MPV 9 9 fL      Platelets 512 Thousands/uL      Neutrophils Relative 63 %      Immat GRANS % 0 %      Lymphocytes Relative 27 %      Monocytes Relative 9 %      Eosinophils Relative 1 %      Basophils Relative 0 %      Neutrophils Absolute 5 12 Thousands/µL      Immature Grans Absolute 0 03 Thousand/uL      Lymphocytes Absolute 2 24 Thousands/µL      Monocytes Absolute 0 71 Thousand/µL      Eosinophils Absolute 0 11 Thousand/µL      Basophils Absolute 0 02 Thousands/µL     UA w Reflex to Microscopic w Reflex to Culture [[id number]]  (Abnormal) Collected: 06/10/21 1004    Lab Status: Final result Specimen: Urine, Clean Catch Updated: 06/10/21 1017     Color, UA Yellow     Clarity, UA Clear     Specific Gravity, UA >=1 030     pH, UA 5 5     Leukocytes, UA Negative     Nitrite, UA Negative     Protein, UA 1+ mg/dl      Glucose, UA 3+ mg/dl      Ketones, UA Negative mg/dl      Urobilinogen, UA 0 2 E U /dl      Bilirubin, UA Negative     Blood, UA Trace-Intact                 CT abdomen pelvis with contrast   Final Result by Ngoc Gaona MD (06/10 1114)      Mild proximal small bowel dilatation with questionable transition within the left midabdomen some degree of underlying low-grade obstruction not entirely excluded              Workstation performed: [id number]                    Procedures  Procedures         ED Course                                           MDM  Number of Diagnoses or Management Options  Diagnosis management comments: ekg does not show Afib  Spoke to Dr Tracy Hays, recommends admission under hospitalist, to keep NPO  Spoke to Dr Asha Lewis who accepts patient to [facility] floor  Amount and/or Complexity of Data Reviewed  Clinical lab tests: ordered and reviewed  Tests in the radiology section of CPT®: ordered and reviewed  Review and summarize past medical records: yes        Disposition  Final diagnoses:   Generalized abdominal pain   SBO (small bowel obstruction) ([facility] )   Hyperglycemia due to diabetes mellitus ([facility] )     Time reflects when diagnosis was documented in both MDM as applicable and the Disposition within this note     Time User Action Codes Description Comment    6/10/2021 11:53 AM Inell Poet Add [R10 84] Generalized abdominal pain     6/10/2021 11:54 AM Inell Poet Add [R73 027] SBO (small bowel obstruction) ([facility] )     6/10/2021 11:54 AM Inell Poet Add [E11 65] Hyperglycemia due to diabetes mellitus [facility])       ED Disposition     ED Disposition Condition Date/Time Comment    Admit Stable u Kenan 10, 2021 11:53 AM Case was discussed with Asha Lewis and the patient's admission status was agreed to be Admission Status: observation status to the service of Dr Asha Lewis   Follow-up Information    None         Current Discharge Medication List      CONTINUE these medications which have NOT CHANGED    Details   losartan (COZAAR) 25 mg tablet Take 1 tablet (25 mg total) by mouth daily  Qty: 30 tablet, Refills: 0    Associated Diagnoses: Hypertension      metFORMIN (GLUCOPHAGE) 500 mg tablet Take 1 tablet (500 mg total) by mouth 2 (two) times a day with meals  Qty: 60 tablet, Refills: 0    Associated Diagnoses: Hyperglycemia;  Uncontrolled diabetes mellitus ([facility] )           No discharge procedures on file      PDMP Review     None          ED Provider  Electronically Signed by           Lary Wiseman PA-C  06/10/21 9552

## 2021-06-10 NOTE — ASSESSMENT & PLAN NOTE
Patient presents with abdominal pain nausea vomiting diarrhea for 3-4 days  CT abdomen pelvis with contrast showed - Mild proximal small bowel dilatation with questionable transition within the left midabdomen some degree of underlying low-grade obstruction not entirely excluded  History of bowel obstruction with lysis of adhesion about 4 years ago  Patient seen by surgery in ED, appreciate input  · Recommend NPO, hydration, follow-up abdominal x-ray in the morning    NPO  Pain control  IV hydration

## 2021-06-11 ENCOUNTER — APPOINTMENT (OUTPATIENT)
Dept: RADIOLOGY | Facility: HOSPITAL | Age: 51
DRG: 389 | End: 2021-06-11
Payer: COMMERCIAL

## 2021-06-11 LAB
EST. AVERAGE GLUCOSE BLD GHB EST-MCNC: 269 MG/DL
GLUCOSE SERPL-MCNC: 113 MG/DL (ref 65–140)
GLUCOSE SERPL-MCNC: 119 MG/DL (ref 65–140)
GLUCOSE SERPL-MCNC: 136 MG/DL (ref 65–140)
GLUCOSE SERPL-MCNC: 150 MG/DL (ref 65–140)
GLUCOSE SERPL-MCNC: 154 MG/DL (ref 65–140)
HBA1C MFR BLD: 11 %

## 2021-06-11 PROCEDURE — 82948 REAGENT STRIP/BLOOD GLUCOSE: CPT

## 2021-06-11 PROCEDURE — 99232 SBSQ HOSP IP/OBS MODERATE 35: CPT | Performed by: INTERNAL MEDICINE

## 2021-06-11 PROCEDURE — 83036 HEMOGLOBIN GLYCOSYLATED A1C: CPT | Performed by: NURSE PRACTITIONER

## 2021-06-11 PROCEDURE — 74022 RADEX COMPL AQT ABD SERIES: CPT

## 2021-06-11 RX ORDER — HYDROMORPHONE HCL/PF 1 MG/ML
0.5 SYRINGE (ML) INJECTION ONCE
Status: COMPLETED | OUTPATIENT
Start: 2021-06-11 | End: 2021-06-11

## 2021-06-11 RX ORDER — HYDROMORPHONE HCL/PF 1 MG/ML
0.5 SYRINGE (ML) INJECTION
Status: DISCONTINUED | OUTPATIENT
Start: 2021-06-11 | End: 2021-06-14 | Stop reason: HOSPADM

## 2021-06-11 RX ADMIN — INSULIN GLARGINE 5 UNITS: 100 INJECTION, SOLUTION SUBCUTANEOUS at 21:33

## 2021-06-11 RX ADMIN — HYDROMORPHONE HYDROCHLORIDE 0.5 MG: 1 INJECTION, SOLUTION INTRAMUSCULAR; INTRAVENOUS; SUBCUTANEOUS at 21:33

## 2021-06-11 RX ADMIN — FAMOTIDINE 20 MG: 10 INJECTION, SOLUTION INTRAVENOUS at 21:19

## 2021-06-11 RX ADMIN — HYDROMORPHONE HYDROCHLORIDE 0.5 MG: 1 INJECTION, SOLUTION INTRAMUSCULAR; INTRAVENOUS; SUBCUTANEOUS at 13:37

## 2021-06-11 RX ADMIN — ONDANSETRON 4 MG: 2 INJECTION INTRAMUSCULAR; INTRAVENOUS at 08:13

## 2021-06-11 RX ADMIN — HYDROMORPHONE HYDROCHLORIDE 0.5 MG: 1 INJECTION, SOLUTION INTRAMUSCULAR; INTRAVENOUS; SUBCUTANEOUS at 01:26

## 2021-06-11 RX ADMIN — Medication 1 PATCH: at 08:06

## 2021-06-11 RX ADMIN — ONDANSETRON 4 MG: 2 INJECTION INTRAMUSCULAR; INTRAVENOUS at 19:09

## 2021-06-11 RX ADMIN — ENOXAPARIN SODIUM 40 MG: 40 INJECTION SUBCUTANEOUS at 08:05

## 2021-06-11 RX ADMIN — SODIUM CHLORIDE, SODIUM LACTATE, POTASSIUM CHLORIDE, AND CALCIUM CHLORIDE 100 ML/HR: .6; .31; .03; .02 INJECTION, SOLUTION INTRAVENOUS at 15:53

## 2021-06-11 RX ADMIN — HYDROMORPHONE HYDROCHLORIDE 0.5 MG: 1 INJECTION, SOLUTION INTRAMUSCULAR; INTRAVENOUS; SUBCUTANEOUS at 10:39

## 2021-06-11 RX ADMIN — HYDROMORPHONE HYDROCHLORIDE 0.5 MG: 1 INJECTION, SOLUTION INTRAMUSCULAR; INTRAVENOUS; SUBCUTANEOUS at 18:23

## 2021-06-11 RX ADMIN — FAMOTIDINE 20 MG: 10 INJECTION, SOLUTION INTRAVENOUS at 08:05

## 2021-06-11 RX ADMIN — KETOROLAC TROMETHAMINE 15 MG: 30 INJECTION, SOLUTION INTRAMUSCULAR at 08:05

## 2021-06-11 RX ADMIN — HYDROMORPHONE HYDROCHLORIDE 0.5 MG: 1 INJECTION, SOLUTION INTRAMUSCULAR; INTRAVENOUS; SUBCUTANEOUS at 05:29

## 2021-06-11 NOTE — ASSESSMENT & PLAN NOTE
Patient presents with abdominal pain nausea vomiting diarrhea for 3-4 days  CT abdomen pelvis with contrast showed - Mild proximal small bowel dilatation with questionable transition within the left midabdomen some degree of underlying low-grade obstruction not entirely excluded  History of bowel obstruction with lysis of adhesion about 4 years ago  Patient seen by surgery in ED, appreciate input  · Recommend NPO, hydration, follow-up abdominal x-ray in the morning  NPO  Pain control  IV hydration       06/11/2021  Patient states his pain is getting worse, complains of nausea, no vomiting  Patient states he had small bowel movement this morning, no further episodes of bowel movement  Patient states he is not passing gas  Patient is stating that current pain medication not helping him  Obtain x-ray of the abdomen, reached out to General surgery  Will follow-up recommendations continue NPO     06/11/2021 at 12:10 p m , received call from general surgeon Dr Shaun Weiner, no need of any acute surgical intervention at this point, continue NPO and IV medication and fluids

## 2021-06-11 NOTE — PROGRESS NOTES
Patient still with c/o abdominal pain  Had loose stool  Obstruction series reveals an air filled loop small bowel LUQ  There was also some air noted in the colon  Abdominal  Exam was soft and minimally distended      IMP: SBO    PLAN: Contiinue Hydration, Continue NPO

## 2021-06-11 NOTE — ASSESSMENT & PLAN NOTE
History of CVA x 2 in his 35s  Likely secondary to paroxysmal AFib  With resultant mild right-sided weakness  Stop taking Pradaxa for months  Not on statin at home  Once patient is surgically cleared will consider starting on systemic anticoagulation Eliquis or Xarelto given his prior history of strokes  And then outpatient follow-up with Cardiology and PCP

## 2021-06-11 NOTE — UTILIZATION REVIEW
Initial Clinical Review    Admission: Date/Time/Statement: 6/10/21 AT 1154 OBSERVATION - CONVERTED TO INPATIENT 6/11/21 AT 1210 2ND SMALL BOWEL OBSTRUCTION WITH WORSENING PAIN - REQUIRES CONTINUED INPATIENT TREATMENT WITH NPO, IVF, IV ANALGESICS + ANTIEMETICS PRN - DESPITE > 24 HOURS OBSERVATION     06/11/21 1211  Inpatient Admission Once     Question Answer Comment   Level of Care Med Surg    Estimated length of stay More than 2 Midnights    Certification I certify that inpatient services are medically necessary for this patient for a duration of greater than two midnights  See H&P and MD Progress Notes for additional information about the patient's course of treatment  06/11/21 1210     ED Arrival Information     Expected Arrival Acuity Service Admission Type    - 6/10/2021 09:41 Urgent General Medicine Urgent    Arrival Complaint    abdominal pain     Chief Complaint   Patient presents with    Abdominal Pain     Pt reports abd pain, vomiting and diarrhea x 3 days  Initial Presentation:   44 y/o male with PMHx HTN, CVA, paroxysmal atrial fibrillation, DM2, bowel obstruction with surgery 4 years prior, appendectomy, cholecystectomy, lysis of intestinal adhesions, nicotine abuse  Presented urgently to  [facility] ED on 6/9/21 2nd 3-4 day history of  diffuse constant upper abdominal pain "feeling bloated with abdominal heaviness" with nausea vomiting and diarrhea  Reports feeling sick every time after eating and then vomiting  about 5-6 times with watery diarrhea 3-4 times per day  Also reports high blood sugar of   400 yesterday and urinating a lot  In ED - Temp 98 4    /107  Exam - Abdomen distended, diffuse tenderness, diminished bowel sounds  CT revealed findings consistent with a 1 mild small bowel obstruction with transition point in the left mid abdomen  Labs:    ED Tx: NPO, IVF x 1 L, IV MS, IV Dilaudid x 2, IV Zofran    Placed in Observation 6/10/21 at 1154 and converted to Inpatient 21 at 1210 2nd Small Bowel Obstruction with worsening pain - requires continued NPO, IVF, IV analgesics + antiemetics prn       Colorectal:  Impression:  Low-grade small-bowel obstruction  Plan: follow-up abdominal x-ray in the morning  Continue NPO, IVF  and IV medication       21 - Day 2:   Reports abdominal pain is getting worse, complains of nausea, no vomiting  States he had small bowel movement this morning, no further episodes of bowel movement  not passing gas  Current pain medication not helping him  Obtain x-ray of the abdomen, reached out to General surgery      Continue NPO      2021 at 12:10 p m , received call from general surgeon Dr Haydee Miller, no need of any acute surgical intervention at this point, continue NPO and IV medication and fluids          ED Triage Vitals   Temperature Pulse Respirations Blood Pressure SpO2   06/10/21 0946 06/10/21 0946 06/10/21 0946 06/10/21 0946 06/10/21 0946   98 4 °F (36 9 °C) (!) 111 18 (!) 149/107 95 %      Temp Source Heart Rate Source Patient Position - Orthostatic VS BP Location FiO2 (%)   06/10/21 0946 06/10/21 0946 06/10/21 1115 06/10/21 1115 --   Tympanic Monitor Sitting Left arm       Pain Score       06/10/21 0946       8          Wt Readings from Last 1 Encounters:   06/10/21 83 4 kg (183 lb 13 8 oz)     Additional Vital Signs:   Temp (24hrs), Av 8 °F (36 6 °C), Min:97 4 °F (36 3 °C), Max:98 1 °F (36 7 °C)   Temp:  [97 4 °F (36 3 °C)-98 1 °F (36 7 °C)] 97 5 °F (36 4 °C)  HR:  [77-86] 81  Resp:  [16-18] 18  BP: (114-138)/(64-88) 128/83  SpO2:  [96 %-99 %] 96 %  Body mass index is 28 8 kg/m²       Intake/Output Summary (Last 24 hours) at 2021 1211:  Gross per 24 hour   Intake 2000 ml   Output 200 ml   Net 1800 ml      Pertinent Labs/Diagnostic Test Results:     Results from last 7 days   Lab Units 06/10/21  1002   WBC Thousand/uL 8 23   HEMOGLOBIN g/dL 18 2*   HEMATOCRIT % 50 9*   PLATELETS Thousands/uL 187   NEUTROS ABS Thousands/µL 5 12       Results from last 7 days   Lab Units 06/10/21  1002   SODIUM mmol/L 136   POTASSIUM mmol/L 4 0   CHLORIDE mmol/L 100   CO2 mmol/L 27   ANION GAP mmol/L 9   BUN mg/dL 16   CREATININE mg/dL 0 69   EGFR ml/min/1 73sq m 109   CALCIUM mg/dL 9 3     Results from last 7 days   Lab Units 06/10/21  1002   AST U/L 19   ALT U/L 44   ALK PHOS U/L 84 2   TOTAL PROTEIN g/dL 6 9   ALBUMIN g/dL 4 4   TOTAL BILIRUBIN mg/dL 0 51     Results from last 7 days   Lab Units 06/11/21  1109 06/11/21  0609 06/11/21  0007 06/10/21  2047 06/10/21  1811 06/10/21  1547   POC GLUCOSE mg/dl 150* 154* 136 144* 170* 189*     Results from last 7 days   Lab Units 06/10/21  1002   GLUCOSE RANDOM mg/dL 301*      Results from last 7 days   Lab Units 06/10/21  1002   TROPONIN I ng/mL <0 03     Results from last 7 days   Lab Units 06/10/21  1002   LACTIC ACID mmol/L 1 7     Results from last 7 days   Lab Units 06/10/21  1002   LIPASE u/L 52       Results from last 7 days   Lab Units 06/10/21  1004   CLARITY UA  Clear   COLOR UA  Yellow   SPEC GRAV UA  >=1 030   PH UA  5 5   GLUCOSE UA mg/dl 3+*   KETONES UA mg/dl Negative   BLOOD UA  Trace-Intact*   PROTEIN UA mg/dl 1+*   NITRITE UA  Negative   BILIRUBIN UA  Negative   UROBILINOGEN UA E U /dl 0 2   LEUKOCYTES UA  Negative   WBC UA /hpf None Seen   RBC UA /hpf 1-2   BACTERIA UA /hpf None Seen   EPITHELIAL CELLS WET PREP /hpf None Seen     CT ABDOMEN AND PELVIS WITH IV CONTRAST  (Per MD):  Mild proximal small bowel dilatation with transition within the left midabdomen     ED Treatment:   Medication Administration from 06/10/2021 0941 to 06/10/2021 1340       Date/Time Order Dose Route Action     06/10/2021 1008 sodium chloride 0 9 % bolus 1,000 mL 1,000 mL Intravenous New Bag     06/10/2021 1008 ondansetron (ZOFRAN) injection 4 mg 4 mg Intravenous Given     06/10/2021 1009 morphine (PF) 4 mg/mL injection 4 mg 4 mg Intravenous Given     06/10/2021 1116 iohexol (OMNIPAQUE) 350 MG/ML injection (SINGLE-DOSE) 100 mL 100 mL Intravenous Given     06/10/2021 1130 HYDROmorphone (DILAUDID) injection 1 mg 1 mg Intravenous Given     06/10/2021 1301 HYDROmorphone (DILAUDID) injection 0 5 mg 0 5 mg Intravenous Given     Past Medical History:   Diagnosis Date    A[facility]fib ([facility] )     Diabetes mellitus ([facility] )     Hypertension     Stroke ([facility] )     x 2 in mid 30's     Present on Admission:   Hypertension    Admitting Diagnosis: SBO (small bowel obstruction) (HCC) [K56 609]  Abdominal pain [R10 9]  Generalized abdominal pain [R10 84]  Hyperglycemia due to diabetes mellitus ([facility] ) [E11 65]    Age/Sex: 45 y o  male    Admission Orders:  Telemetry  VS q4hrs  Continuous Pulse Oximetry  Up + OOB with assistance  NPO  BBG q6hrs   I+O q shift    Scheduled Medications:  enoxaparin, 40 mg, Subcutaneous, Daily  famotidine, 20 mg, Intravenous, Q12H BERNARD  insulin glargine, 5 Units, Subcutaneous, HS  insulin lispro, 1-5 Units, Subcutaneous, Q6H BERNARD  losartan, 25 mg, Oral, Daily  nicotine, 1 patch, Transdermal, Daily    Continuous IV Infusions:  IVF lactated ringers, 100 mL/hr, Intravenous, Continuous    PRN Meds:  acetaminophen, 650 mg, Oral, Q6H PRN  IV HYDROmorphone, 0 5 mg, Intravenous, Q3H PRN GIVEN X 3  IV ketorolac, 15 mg, Intravenous, Q6H PRN GIVEN X 2  IV ondansetron, 4 mg, Intravenous, Q6H PRN GIVEN X 1    Network Utilization Review Department  ATTENTION: Please call with any questions or concerns to [phone] and carefully listen to the prompts so that you are directed to the right person  All voicemails are confidential   Ty Limb all requests for admission clinical reviews, approved or denied determinations and any other requests to dedicated fax number below belonging to the campus where the patient is receiving treatment   List of dedicated fax numbers for the Facilities:  [address] [place] [address] DENIALS (Administrative/Medical Necessity) [phone]   [address] (Maternity/NICU/Pediatrics) 261 [facility] [facility] [address] [id number] [place] [address] [address] [facility] [address] [facility] [phone]

## 2021-06-11 NOTE — PROGRESS NOTES
Leslye U  65   Progress Note - Aman Rocha 1970, 45 y o  male MRN: [id number]  Unit/Bed#: -01 Encounter: [id number]  Primary Care Provider: No primary care provider on file  Date and time admitted to hospital: 6/10/2021  9:50 AM    * Small bowel obstruction [facility])  Assessment & Plan  Patient presents with abdominal pain nausea vomiting diarrhea for 3-4 days  CT abdomen pelvis with contrast showed - Mild proximal small bowel dilatation with questionable transition within the left midabdomen some degree of underlying low-grade obstruction not entirely excluded  History of bowel obstruction with lysis of adhesion about 4 years ago  Patient seen by surgery in ED, appreciate input  · Recommend NPO, hydration, follow-up abdominal x-ray in the morning  NPO  Pain control  IV hydration       06/11/2021  Patient states his pain is getting worse, complains of nausea, no vomiting  Patient states he had small bowel movement this morning, no further episodes of bowel movement  Patient states he is not passing gas  Patient is stating that current pain medication not helping him  Obtain x-ray of the abdomen, reached out to General surgery  Will follow-up recommendations continue NPO     06/11/2021 at 12:10 p m , received call from general surgeon Dr Kings Martinez, no need of any acute surgical intervention at this point, continue NPO and IV medication and fluids  Nicotine abuse  Assessment & Plan  Smoking cessation, nicotine patch    History of CVA (cerebrovascular accident)  Assessment & Plan  History of CVA x 2 in his 35s  Likely secondary to paroxysmal AFib  With resultant mild right-sided weakness  Stop taking Pradaxa for months  Not on statin at home  Once patient is surgically cleared will consider starting on systemic anticoagulation Eliquis or Xarelto given his prior history of strokes  And then outpatient follow-up with Cardiology and PCP        Paroxysmal atrial fibrillation [facility])  Assessment & Plan  Patient stopped taking medications for months  Recently moved to the area in November last year, does not have a PCP or cardiologist in area  Reports feeling palpitation about 2 times a day  EKG today showed sinus tach, rate 105  Place patient on telemetry  Optimize electrolytes  Recommend referral to cardiology on discharge    Currently patient in sinus rhythm  Type 2 diabetes mellitus, without long-term current use of insulin [facility])  Assessment & Plan  Lab Results   Component Value Date    HGBA1C 10 8 (H) 04/23/2021       Recent Labs     06/10/21  2047 06/11/21  0007 06/11/21  0609 06/11/21  1109   POCGLU 144* 136 154* 150*       Blood Sugar Average: Last 72 hrs:  (P) 668 [id number] glucose 301  Patient is on metformin 500 mg p o  B i d  At home  Hold metformin  Start Lantus 8 units subcu HS  SSI  Repeat A1c    Hypertension  Assessment & Plan  Continue losartan with holding parameter  BP stable        VTE Pharmacologic Prophylaxis:   Pharmacologic: Enoxaparin (Lovenox)  Mechanical VTE Prophylaxis in Place: Yes    Patient Centered Rounds: I have performed bedside rounds with nursing staff today  Discussions with Specialists or Other Care Team Provider:  General surgery    Education and Discussions with Family / Patient:  Patient    Time Spent for Care: 30 minutes  More than 50% of total time spent on counseling and coordination of care as described above  Current Length of Stay: 0 day(s)    Current Patient Status: Inpatient   Certification Statement: The patient will continue to require additional inpatient hospital stay due to Suspected small-bowel obstruction    Discharge Plan:  24-48 hours    Code Status: Level 1 - Full Code      Subjective:   Patient still complains of abdominal pain, 7/10 in severity, no radiation, diffusely painful, associated with nausea  One bowel movement this morning  Denies any flatulence      Objective:     Vitals:   Temp (24hrs), Av 8 °F (36 6 °C), Min:97 4 °F (36 3 °C), Max:98 1 °F (36 7 °C)    Temp:  [97 4 °F (36 3 °C)-98 1 °F (36 7 °C)] 97 5 °F (36 4 °C)  HR:  [77-86] 81  Resp:  [16-18] 18  BP: (114-138)/(64-88) 128/83  SpO2:  [96 %-99 %] 96 %  Body mass index is 28 8 kg/m²  Input and Output Summary (last 24 hours): Intake/Output Summary (Last 24 hours) at 2021 1211  Last data filed at 6/10/2021 2054  Gross per 24 hour   Intake 2000 ml   Output 200 ml   Net 1800 ml       Physical Exam:     Physical Exam  General appearance:  Patient not in acute distress  Eyes:  Pupils equal reacting to light  ENT:  Moist oral mucous membranes  CVS:  S1-S2 heard, regular rate and rhythm, no pedal edema  Chest:  Bilateral air entry present, clear to auscultation  Abdomen:  Distended, tender, guarding present  CNS:  No focal neurological deficits  Genitourinary: deferred  Skin:  No acute rash   psychiatric:  No psychosis  Musculoskeletal:  No joint deformities      Additional Data:     Labs:    Results from last 7 days   Lab Units 06/10/21  1002   WBC Thousand/uL 8 23   HEMOGLOBIN g/dL 18 2*   HEMATOCRIT % 50 9*   PLATELETS Thousands/uL 187   NEUTROS PCT % 63   LYMPHS PCT % 27   MONOS PCT % 9   EOS PCT % 1     Results from last 7 days   Lab Units 06/10/21  1002   SODIUM mmol/L 136   POTASSIUM mmol/L 4 0   CHLORIDE mmol/L 100   CO2 mmol/L 27   BUN mg/dL 16   CREATININE mg/dL 0 69   ANION GAP mmol/L 9   CALCIUM mg/dL 9 3   ALBUMIN g/dL 4 4   TOTAL BILIRUBIN mg/dL 0 51   ALK PHOS U/L 84 2   ALT U/L 44   AST U/L 19   GLUCOSE RANDOM mg/dL 301*         Results from last 7 days   Lab Units 21  1109 21  0609 21  0007 06/10/21  2047 06/10/21  1811 06/10/21  1547   POC GLUCOSE mg/dl 150* 154* 136 144* 170* 189*         Results from last 7 days   Lab Units 06/10/21  1002   LACTIC ACID mmol/L 1 7           * I Have Reviewed All Lab Data Listed Above  * Additional Pertinent Lab Tests Reviewed:  Rupesh Hathaway Admission Reviewed        Recent Cultures (last 7 days):           Last 24 Hours Medication List:   Current Facility-Administered Medications   Medication Dose Route Frequency Provider Last Rate    acetaminophen  650 mg Oral Q6H PRN NURIA Iniguez      enoxaparin  40 mg Subcutaneous Daily NURIA Iniguez      famotidine  20 mg Intravenous Q12H [facility] NURIA Iniguez      HYDROmorphone  0 5 mg Intravenous Q3H PRN Trav Tang MD      insulin glargine  5 Units Subcutaneous HS Becca Li MD      insulin lispro  1-5 Units Subcutaneous Q6H [facility] NURIA Iniguez      ketorolac  15 mg Intravenous Q6H PRN NURIA Iniguez      lactated ringers  100 mL/hr Intravenous Continuous NURIA Iniguez 100 mL/hr (06/10/21 1542)    losartan  25 mg Oral Daily NURIA Iniguez      nicotine  1 patch Transdermal Daily NURIA Iniguez      ondansetron  4 mg Intravenous Q6H PRN NURIA Iniguez          Today, Patient Was Seen By: Chrissy Burris MD    ** Please Note: Dictation voice to text software may have been used in the creation of this document   **

## 2021-06-11 NOTE — ASSESSMENT & PLAN NOTE
Patient stopped taking medications for months  Recently moved to the area in November last year, does not have a PCP or cardiologist in area  Reports feeling palpitation about 2 times a day  EKG today showed sinus tach, rate 105  Place patient on telemetry  Optimize electrolytes  Recommend referral to cardiology on discharge    Currently patient in sinus rhythm

## 2021-06-12 ENCOUNTER — APPOINTMENT (INPATIENT)
Dept: RADIOLOGY | Facility: HOSPITAL | Age: 51
DRG: 389 | End: 2021-06-12
Payer: COMMERCIAL

## 2021-06-12 LAB
ANION GAP SERPL CALCULATED.3IONS-SCNC: 8 MMOL/L (ref 4–13)
ATRIAL RATE: 105 BPM
BASOPHILS # BLD AUTO: 0.01 THOUSANDS/ΜL (ref 0–0.1)
BASOPHILS NFR BLD AUTO: 0 % (ref 0–1)
BUN SERPL-MCNC: 14 MG/DL (ref 6–20)
CALCIUM SERPL-MCNC: 8 MG/DL (ref 8.4–10.2)
CHLORIDE SERPL-SCNC: 106 MMOL/L (ref 96–108)
CO2 SERPL-SCNC: 26 MMOL/L (ref 22–33)
CREAT SERPL-MCNC: 0.54 MG/DL (ref 0.5–1.2)
EOSINOPHIL # BLD AUTO: 0.13 THOUSAND/ΜL (ref 0–0.61)
EOSINOPHIL NFR BLD AUTO: 2 % (ref 0–6)
ERYTHROCYTE [DISTWIDTH] IN BLOOD BY AUTOMATED COUNT: 12.6 % (ref 11.6–15.1)
GFR SERPL CREATININE-BSD FRML MDRD: 121 ML/MIN/1.73SQ M
GLUCOSE SERPL-MCNC: 109 MG/DL (ref 65–140)
GLUCOSE SERPL-MCNC: 109 MG/DL (ref 65–140)
GLUCOSE SERPL-MCNC: 170 MG/DL (ref 65–140)
GLUCOSE SERPL-MCNC: 200 MG/DL (ref 65–140)
GLUCOSE SERPL-MCNC: 223 MG/DL (ref 65–140)
GLUCOSE SERPL-MCNC: 94 MG/DL (ref 65–140)
GLUCOSE SERPL-MCNC: 96 MG/DL (ref 65–140)
HCT VFR BLD AUTO: 44.1 % (ref 36.5–49.3)
HGB BLD-MCNC: 15.3 G/DL (ref 12–17)
IMM GRANULOCYTES # BLD AUTO: 0.01 THOUSAND/UL (ref 0–0.2)
IMM GRANULOCYTES NFR BLD AUTO: 0 % (ref 0–2)
LACTATE SERPL-SCNC: 0.5 MMOL/L (ref 0–2)
LIPASE SERPL-CCNC: 9 U/L (ref 13–60)
LYMPHOCYTES # BLD AUTO: 1.62 THOUSANDS/ΜL (ref 0.6–4.47)
LYMPHOCYTES NFR BLD AUTO: 24 % (ref 14–44)
MCH RBC QN AUTO: 30.5 PG (ref 26.8–34.3)
MCHC RBC AUTO-ENTMCNC: 34.7 G/DL (ref 31.4–37.4)
MCV RBC AUTO: 88 FL (ref 82–98)
MONOCYTES # BLD AUTO: 0.61 THOUSAND/ΜL (ref 0.17–1.22)
MONOCYTES NFR BLD AUTO: 9 % (ref 4–12)
NEUTROPHILS # BLD AUTO: 4.39 THOUSANDS/ΜL (ref 1.85–7.62)
NEUTS SEG NFR BLD AUTO: 65 % (ref 43–75)
P AXIS: 68 DEGREES
PLATELET # BLD AUTO: 176 THOUSANDS/UL (ref 149–390)
PMV BLD AUTO: 10 FL (ref 8.9–12.7)
POTASSIUM SERPL-SCNC: 3.4 MMOL/L (ref 3.5–5)
PR INTERVAL: 150 MS
QRS AXIS: 57 DEGREES
QRSD INTERVAL: 86 MS
QT INTERVAL: 334 MS
QTC INTERVAL: 442 MS
RBC # BLD AUTO: 5.01 MILLION/UL (ref 3.88–5.62)
SODIUM SERPL-SCNC: 140 MMOL/L (ref 133–145)
T WAVE AXIS: 70 DEGREES
VENTRICULAR RATE: 105 BPM
WBC # BLD AUTO: 6.77 THOUSAND/UL (ref 4.31–10.16)

## 2021-06-12 PROCEDURE — 85025 COMPLETE CBC W/AUTO DIFF WBC: CPT | Performed by: INTERNAL MEDICINE

## 2021-06-12 PROCEDURE — 93010 ELECTROCARDIOGRAM REPORT: CPT | Performed by: INTERNAL MEDICINE

## 2021-06-12 PROCEDURE — C9113 INJ PANTOPRAZOLE SODIUM, VIA: HCPCS | Performed by: INTERNAL MEDICINE

## 2021-06-12 PROCEDURE — 83605 ASSAY OF LACTIC ACID: CPT | Performed by: INTERNAL MEDICINE

## 2021-06-12 PROCEDURE — 82948 REAGENT STRIP/BLOOD GLUCOSE: CPT

## 2021-06-12 PROCEDURE — 99233 SBSQ HOSP IP/OBS HIGH 50: CPT | Performed by: INTERNAL MEDICINE

## 2021-06-12 PROCEDURE — 83690 ASSAY OF LIPASE: CPT | Performed by: INTERNAL MEDICINE

## 2021-06-12 PROCEDURE — 80048 BASIC METABOLIC PNL TOTAL CA: CPT | Performed by: INTERNAL MEDICINE

## 2021-06-12 PROCEDURE — 74018 RADEX ABDOMEN 1 VIEW: CPT

## 2021-06-12 RX ORDER — PANTOPRAZOLE SODIUM 40 MG/1
40 INJECTION, POWDER, FOR SOLUTION INTRAVENOUS EVERY 12 HOURS SCHEDULED
Status: DISCONTINUED | OUTPATIENT
Start: 2021-06-12 | End: 2021-06-14 | Stop reason: HOSPADM

## 2021-06-12 RX ORDER — MAGNESIUM HYDROXIDE/ALUMINUM HYDROXICE/SIMETHICONE 120; 1200; 1200 MG/30ML; MG/30ML; MG/30ML
30 SUSPENSION ORAL EVERY 4 HOURS PRN
Status: DISCONTINUED | OUTPATIENT
Start: 2021-06-12 | End: 2021-06-14 | Stop reason: HOSPADM

## 2021-06-12 RX ADMIN — INSULIN GLARGINE 5 UNITS: 100 INJECTION, SOLUTION SUBCUTANEOUS at 21:35

## 2021-06-12 RX ADMIN — ENOXAPARIN SODIUM 40 MG: 40 INJECTION SUBCUTANEOUS at 10:26

## 2021-06-12 RX ADMIN — FAMOTIDINE 20 MG: 10 INJECTION, SOLUTION INTRAVENOUS at 10:21

## 2021-06-12 RX ADMIN — HYDROMORPHONE HYDROCHLORIDE 0.5 MG: 1 INJECTION, SOLUTION INTRAMUSCULAR; INTRAVENOUS; SUBCUTANEOUS at 06:08

## 2021-06-12 RX ADMIN — PANTOPRAZOLE SODIUM 40 MG: 40 INJECTION, POWDER, FOR SOLUTION INTRAVENOUS at 12:23

## 2021-06-12 RX ADMIN — HYDROMORPHONE HYDROCHLORIDE 0.5 MG: 1 INJECTION, SOLUTION INTRAMUSCULAR; INTRAVENOUS; SUBCUTANEOUS at 21:28

## 2021-06-12 RX ADMIN — HYDROMORPHONE HYDROCHLORIDE 0.5 MG: 1 INJECTION, SOLUTION INTRAMUSCULAR; INTRAVENOUS; SUBCUTANEOUS at 02:45

## 2021-06-12 RX ADMIN — INSULIN LISPRO 1 UNITS: 100 INJECTION, SOLUTION INTRAVENOUS; SUBCUTANEOUS at 18:14

## 2021-06-12 RX ADMIN — SODIUM CHLORIDE, SODIUM LACTATE, POTASSIUM CHLORIDE, AND CALCIUM CHLORIDE 100 ML/HR: .6; .31; .03; .02 INJECTION, SOLUTION INTRAVENOUS at 02:39

## 2021-06-12 RX ADMIN — FAMOTIDINE 20 MG: 10 INJECTION, SOLUTION INTRAVENOUS at 21:27

## 2021-06-12 RX ADMIN — HYDROMORPHONE HYDROCHLORIDE 0.5 MG: 1 INJECTION, SOLUTION INTRAMUSCULAR; INTRAVENOUS; SUBCUTANEOUS at 18:16

## 2021-06-12 RX ADMIN — SODIUM CHLORIDE, SODIUM LACTATE, POTASSIUM CHLORIDE, AND CALCIUM CHLORIDE 100 ML/HR: .6; .31; .03; .02 INJECTION, SOLUTION INTRAVENOUS at 18:12

## 2021-06-12 RX ADMIN — HYDROMORPHONE HYDROCHLORIDE 0.5 MG: 1 INJECTION, SOLUTION INTRAMUSCULAR; INTRAVENOUS; SUBCUTANEOUS at 13:47

## 2021-06-12 RX ADMIN — Medication 1 PATCH: at 10:26

## 2021-06-12 RX ADMIN — HYDROMORPHONE HYDROCHLORIDE 0.5 MG: 1 INJECTION, SOLUTION INTRAMUSCULAR; INTRAVENOUS; SUBCUTANEOUS at 10:18

## 2021-06-12 NOTE — PROGRESS NOTES
Pain still persists unremitting located in mid abdomen with some radiation to back  Last obstruction series reveled non specific gas pattern with loop of small bowel LUQ  CBC completely unremarkable  Lipase on admission 52  Abdominal exam: tender mid epigasrium  Otherwise soft  Has loose stool yesterday        IMP:Etiology of Abdominal Pain uncertain    PLAN:  R/O Pancreatitis - repeat Lipase  Consider gastric ulcer _ Start IV Protonix  Consider GI consult  Repeat Obstruction Series

## 2021-06-12 NOTE — CONSULTS
Consultation - [facility]  Aman Rocha 45 y o  male MRN: [id number]  Unit/Bed#: -01 Encounter: [id number]              Inpatient consult to gastroenterology     Performed by  Christie Gaona MD     Authorized by Michelle Spears MD              Reason for Consult / Principal Problem:     Epigastric pain    ASSESSMENT AND PLAN:      1  Epigastric pain  Patient currently complaining of nearly 8/10 epigastric pain, mainly localized but sometimes also feels likely discharge her lysed  Associated with some degree of nausea and dry heaving however since coming to the hospital has not had any episodes of vomiting  This is also associated with watery bowel movements which happen almost every other day  Last bowel movement was yesterday  Patient is not passing any gas  Is able to tolerate diet  Pain quality or severity does not change with dietary intake  Denies any active vomiting, weight loss, hematemesis, BRBPR, melena, any family history of GI cancers  Prior to the admission patient complains that he had significant nausea and multiple episodes of vomiting  Denies any blood in vomitus or stool  Denies use of over-the-counter pain medications or NSAIDs  Active smoker  No history of aggressive alcohol use  Never had EGD or colonoscopy before  On presentation there was some concern of obstructive pattern on the CT scan however repeat KUB shows nonobstructive bowel gas pattern  Lipase normal x2  Plan:  -- continue protonix  -- differentials at this point include pain due to SBO vs  Peptic ulcer disease  -- EGD on Monday, please keep NPO from Sunday night  -- diet as tolerated    2  Small bowel obstruction  Patient has a history of small-bowel obstruction about 4 years ago requiring small-bowel resection    Also has had cholecystectomy and appendicectomy in the past   On this presentation CT scan did reveal some changes concerning for small bowel obstruction however patient is having soft and liquidy bowel movements at present  Is able to tolerate diet  Repeat KUB shows nonobstructive pattern  Colorectal surgery is on board  Likely the small-bowel obstruction is now relieved  ______________________________________________________________________    HPI:  Patient is a 76-year-old male with past medical history of small-bowel obstruction status post resection 4 years ago, status post cholecystectomy and appendicectomy, paroxysmal AFib, DM, CVA, active tobacco use who presented to the ED on 06/10/2021 with primary complaints of epigastric pain, nausea, vomiting and diarrhea for almost 3-4 days  Reports that the pain was initially generalized however now is concentrated mainly in the epigastric area, is around 8/10 on intensity, sharp in nature, nonradiating, feels like heaviness associated with bloating  Also reports that initially he felt sick every time he ate and almost ended up throwing up however the vomiting has improved since coming to the hospital   Reports watery bowel movements, 1 almost every other day  At baseline does have a bowel movement daily  Patient denies any fever, hematemesis, melena, BRBPR, any recent weight loss, any prior history of EGD or colonoscopy, any family history of GI cancers, any excessive use of over-the-counter pain medications or NSAIDs, aggressive alcohol use  REVIEW OF SYSTEMS:    CONSTITUTIONAL: Denies any fever, chills, rigors, and weight loss  HEENT: No earache or tinnitus  Denies hearing loss or visual disturbances  CARDIOVASCULAR: No chest pain or palpitations  RESPIRATORY: Denies any cough, hemoptysis, shortness of breath or dyspnea on exertion  GASTROINTESTINAL: As noted in the History of Present Illness  GENITOURINARY: No problems with urination  Denies any hematuria or dysuria  NEUROLOGIC: No dizziness or vertigo, denies headaches  MUSCULOSKELETAL: Denies any muscle or joint pain  SKIN: Denies skin rashes or itching  ENDOCRINE: Denies excessive thirst  Denies intolerance to heat or cold  PSYCHOSOCIAL: Denies depression or anxiety  Denies any recent memory loss  Historical Information   Past Medical History:   Diagnosis Date    A-fib ([facility] )     Diabetes mellitus ([facility] )     Hypertension     Stroke ([facility] )     x 2 in mid 30's     Past Surgical History:   Procedure Laterality Date    ABDOMINAL SURGERY      APPENDECTOMY      CHOLECYSTECTOMY      LAPAROSCOPIC LYSIS INTESTINAL ADHESIONS       Social History   Social History     Substance and Sexual Activity   Alcohol Use Not Currently    Frequency: Monthly or less    Drinks per session: 1 or 2     Social History     Substance and Sexual Activity   Drug Use Never     Social History     Tobacco Use   Smoking Status Current Every Day Smoker    Packs/day: 0 50    Types: Cigarettes   Smokeless Tobacco Never Used     History reviewed  No pertinent family history      Meds/Allergies     Medications Prior to Admission   Medication    losartan (COZAAR) 25 mg tablet    metFORMIN (GLUCOPHAGE) 500 mg tablet     Current Facility-Administered Medications   Medication Dose Route Frequency    acetaminophen (TYLENOL) tablet 650 mg  650 mg Oral Q6H PRN    aluminum-magnesium hydroxide-simethicone (MYLANTA) oral suspension 30 mL  30 mL Oral Q4H PRN    enoxaparin (LOVENOX) subcutaneous injection 40 mg  40 mg Subcutaneous Daily    famotidine (PEPCID) injection 20 mg  20 mg Intravenous Q12H [facility]    HYDROmorphone (DILAUDID) injection 0 5 mg  0 5 mg Intravenous Q3H PRN    insulin glargine (LANTUS) subcutaneous injection 5 Units 0 05 mL  5 Units Subcutaneous HS    insulin lispro (HumaLOG) 100 units/mL subcutaneous injection 1-5 Units  1-5 Units Subcutaneous Q6H [facility]    ketorolac (TORADOL) injection 15 mg  15 mg Intravenous Q6H PRN    lactated ringers infusion  100 mL/hr Intravenous Continuous    losartan (COZAAR) tablet 25 mg  25 mg Oral Daily    nicotine (NICODERM CQ) 14 mg/24hr TD 24 hr patch 1 patch  1 patch Transdermal Daily    ondansetron (ZOFRAN) injection 4 mg  4 mg Intravenous Q6H PRN    pantoprazole (PROTONIX) injection 40 mg  40 mg Intravenous Q12H [address]       No Known Allergies        Objective     Blood pressure 133/82, pulse 89, temperature 98 1 °F (36 7 °C), temperature source Tympanic, resp  rate 17, height 5' 7" (1 702 m), weight 83 4 kg (183 lb 13 8 oz), SpO2 96 %  Body mass index is 28 8 kg/m²  Intake/Output Summary (Last 24 hours) at 6/12/2021 1251  Last data filed at 6/12/2021 0101  Gross per 24 hour   Intake 1255 ml   Output 500 ml   Net 755 ml         PHYSICAL EXAM:      General Appearance:   Alert, cooperative, no distress   HEENT:   Normocephalic, atraumatic, anicteric      Neck:  Supple, symmetrical, trachea midline   Lungs:   Clear to auscultation bilaterally; no rales, rhonchi or wheezing; respirations unlabored    Heart[de-identified]   Regular rate and rhythm; no murmur, rub, or gallop     Abdomen:   Soft, tenderness in epigastric area on superficial as well as deep palpation, voluntary guarding noted, no rigidity, no distension, no overlying skin changes except from previous surgical scars   Genitalia:   Deferred    Rectal:   Deferred    Extremities:  No cyanosis, clubbing or edema    Pulses:  2+ and symmetric all extremities    Skin:  No jaundice, rashes, or lesions    Lymph nodes:  No palpable cervical lymphadenopathy        Lab Results:   Admission on 06/10/2021   Component Date Value    WBC 06/10/2021 8 23     RBC 06/10/2021 6 00*    Hemoglobin 06/10/2021 18 2*    Hematocrit 06/10/2021 50 9*    MCV 06/10/2021 85     MCH 06/10/2021 30 3     MCHC 06/10/2021 35 8     RDW 06/10/2021 12 5     MPV 06/10/2021 9 9     Platelets 26/46/0677 187     Neutrophils Relative 06/10/2021 63     Immat GRANS % 06/10/2021 0     Lymphocytes Relative 06/10/2021 27     Monocytes Relative 06/10/2021 9     Eosinophils Relative 06/10/2021 1     Basophils Relative 06/10/2021 0     Neutrophils Absolute 06/10/2021 5 12     Immature Grans Absolute 06/10/2021 0 03     Lymphocytes Absolute 06/10/2021 2 24     Monocytes Absolute 06/10/2021 0 71     Eosinophils Absolute 06/10/2021 0 11     Basophils Absolute 06/10/2021 0 02     Sodium 06/10/2021 136     Potassium 06/10/2021 4 0     Chloride 06/10/2021 100     CO2 06/10/2021 27     ANION GAP 06/10/2021 9     BUN 06/10/2021 16     Creatinine 06/10/2021 0 69     Glucose 06/10/2021 301*    Calcium 06/10/2021 9 3     AST 06/10/2021 19     ALT 06/10/2021 44     Alkaline Phosphatase 06/10/2021 84 2     Total Protein 06/10/2021 6 9     Albumin 06/10/2021 4 4     Total Bilirubin 06/10/2021 0 51     eGFR 06/10/2021 109     Lipase 06/10/2021 52     LACTIC ACID 06/10/2021 1 7     Color, UA 06/10/2021 Yellow     Clarity, UA 06/10/2021 Clear     Specific Gravity, UA 06/10/2021 >=1 030     pH, UA 06/10/2021 5 5     Leukocytes, UA 06/10/2021 Negative     Nitrite, UA 06/10/2021 Negative     Protein, UA 06/10/2021 1+*    Glucose, UA 06/10/2021 3+*    Ketones, UA 06/10/2021 Negative     Urobilinogen, UA 06/10/2021 0 2     Bilirubin, UA 06/10/2021 Negative     Blood, UA 06/10/2021 Trace-Intact*    Troponin I 06/10/2021 <0 03     RBC, UA 06/10/2021 1-2     WBC, UA 06/10/2021 None Seen     Epithelial Cells 06/10/2021 None Seen     Bacteria, UA 06/10/2021 None Seen     Ventricular Rate 06/10/2021 105     Atrial Rate 06/10/2021 105     WI Interval 06/10/2021 150     QRSD Interval 06/10/2021 86     QT Interval 06/10/2021 334     QTC Interval 06/10/2021 442     P Axis 06/10/2021 68     QRS Axis 06/10/2021 57     T Wave Axis 06/10/2021 70     POC Glucose 06/10/2021 189*    POC Glucose 06/10/2021 170*    POC Glucose 06/10/2021 144*    POC Glucose 06/11/2021 136     Hemoglobin A1C 06/11/2021 11 0*    EAG 06/11/2021 269     POC Glucose 06/11/2021 154*    POC Glucose 06/11/2021 150*    POC Glucose 06/11/2021 113     POC Glucose 06/11/2021 119     WBC 06/12/2021 6 77     RBC 06/12/2021 5 01     Hemoglobin 06/12/2021 15 3     Hematocrit 06/12/2021 44 1     MCV 06/12/2021 88     MCH 06/12/2021 30 5     MCHC 06/12/2021 34 7     RDW 06/12/2021 12 6     MPV 06/12/2021 10 0     Platelets 92/68/2285 176     Neutrophils Relative 06/12/2021 65     Immat GRANS % 06/12/2021 0     Lymphocytes Relative 06/12/2021 24     Monocytes Relative 06/12/2021 9     Eosinophils Relative 06/12/2021 2     Basophils Relative 06/12/2021 0     Neutrophils Absolute 06/12/2021 4 39     Immature Grans Absolute 06/12/2021 0 01     Lymphocytes Absolute 06/12/2021 1 62     Monocytes Absolute 06/12/2021 0 61     Eosinophils Absolute 06/12/2021 0 13     Basophils Absolute 06/12/2021 0 01     Sodium 06/12/2021 140     Potassium 06/12/2021 3 4*    Chloride 06/12/2021 106     CO2 06/12/2021 26     ANION GAP 06/12/2021 8     BUN 06/12/2021 14     Creatinine 06/12/2021 0 54     Glucose 06/12/2021 94     Calcium 06/12/2021 8 0*    eGFR 06/12/2021 121     LACTIC ACID 06/12/2021 0 5     POC Glucose 06/12/2021 109     POC Glucose 06/12/2021 96     POC Glucose 06/12/2021 109     Lipase 06/12/2021 9*    POC Glucose 06/12/2021 170*       Imaging Studies: I have personally reviewed pertinent imaging studies

## 2021-06-12 NOTE — ASSESSMENT & PLAN NOTE
Patient presents with abdominal pain nausea vomiting diarrhea for 3-4 days  CT abdomen pelvis with contrast showed - Mild proximal small bowel dilatation with questionable transition within the left midabdomen some degree of underlying low-grade obstruction not entirely excluded  History of bowel obstruction with lysis of adhesion about 4 years ago  Patient seen by surgery in ED, appreciate input  · Recommend NPO, hydration, follow-up abdominal x-ray in the morning  NPO  Pain control  IV hydration       06/11/2021  Patient states his pain is getting worse, complains of nausea, no vomiting  Patient states he had small bowel movement this morning, no further episodes of bowel movement  Patient states he is not passing gas  Patient is stating that current pain medication not helping him  Obtain x-ray of the abdomen, reached out to General surgery  Will follow-up recommendations continue NPO     06/11/2021 at 12:10 p m , received call from general surgeon Dr Jeff Hopper, no need of any acute surgical intervention at this point, continue NPO and IV medication and fluids  Repeat x-ray was reviewed shows no obstruction  Lipase was repeated which was normal   Plan was discussed with surgical team   Pain is in the epigastric region and likely gastritis versus peptic ulcer disease  GI input was obtained  If persists to have pain will be scheduled for EGD on Monday  Patient has been started on clear liquid diet and will monitor for now

## 2021-06-12 NOTE — PROGRESS NOTES
Leslye U  65   Progress Note - Aman Rocha 1970, 45 y o  male MRN: [id number]  Unit/Bed#: -01 Encounter: [id number]  Primary Care Provider: No primary care provider on file  Date and time admitted to hospital: 6/10/2021  9:50 AM    * Small bowel obstruction [facility])  Assessment & Plan  Patient presents with abdominal pain nausea vomiting diarrhea for 3-4 days  CT abdomen pelvis with contrast showed - Mild proximal small bowel dilatation with questionable transition within the left midabdomen some degree of underlying low-grade obstruction not entirely excluded  History of bowel obstruction with lysis of adhesion about 4 years ago  Patient seen by surgery in ED, appreciate input  · Recommend NPO, hydration, follow-up abdominal x-ray in the morning  NPO  Pain control  IV hydration       06/11/2021  Patient states his pain is getting worse, complains of nausea, no vomiting  Patient states he had small bowel movement this morning, no further episodes of bowel movement  Patient states he is not passing gas  Patient is stating that current pain medication not helping him  Obtain x-ray of the abdomen, reached out to General surgery  Will follow-up recommendations continue NPO     06/11/2021 at 12:10 p m , received call from general surgeon Dr Mary Lambert, no need of any acute surgical intervention at this point, continue NPO and IV medication and fluids  Repeat x-ray was reviewed shows no obstruction  Lipase was repeated which was normal   Plan was discussed with surgical team   Pain is in the epigastric region and likely gastritis versus peptic ulcer disease  GI input was obtained  If persists to have pain will be scheduled for EGD on Monday  Patient has been started on clear liquid diet and will monitor for now      Nicotine abuse  Assessment & Plan  Smoking cessation, nicotine patch    History of CVA (cerebrovascular accident)  Assessment & Plan  History of CVA x 2 in his 35s   Likely secondary to paroxysmal AFib  With resultant mild right-sided weakness  Stop taking Pradaxa for months  Not on statin at home  Once patient is surgically cleared will consider starting on systemic anticoagulation Eliquis or Xarelto given his prior history of strokes  And then outpatient follow-up with Cardiology and PCP  Type 2 diabetes mellitus, without long-term current use of insulin [facility])  Assessment & Plan  Lab Results   Component Value Date    HGBA1C 11 0 (H) 06/11/2021       Recent Labs     06/12/21  0325 06/12/21  0559 06/12/21  0832 06/12/21  1204   POCGLU 109 96 109 170*       Blood Sugar Average: Last 72 hrs:  (P) 138 25 glucose 301  Patient is on metformin 500 mg p o  B i d  At home  Hold metformin  Start Lantus 8 units subcu HS  SSI  Repeat A1c    Hypertension  Assessment & Plan  Continue losartan with holding parameter  BP stable          Subjective/Objective     Subjective:   Patient is having abdominal pain in the epigastric region  Patient states he has nausea but no vomiting  Patient has no fever or chest pain  Objective:  Vitals: Blood pressure 138/92, pulse 85, temperature 98 7 °F (37 1 °C), temperature source Tympanic, resp  rate 15, height 5' 7" (1 702 m), weight 83 4 kg (183 lb 13 8 oz), SpO2 95 %  ,Body mass index is 28 8 kg/m²        Intake/Output Summary (Last 24 hours) at 6/12/2021 1629  Last data filed at 6/12/2021 0101  Gross per 24 hour   Intake 255 ml   Output 500 ml   Net -245 ml       Invasive Devices     Peripheral Intravenous Line            Peripheral IV 06/10/21 Right Antecubital 2 days                Physical Exam:  HEENT-PERRLA, moist oral mucosa  Neck-supple, no JVD elevation   Respiratory-equal air entry bilaterally, no rales or rhonchi  Cardiovascular system-S1, S2 heard, no murmur or gallops or rubs  Abdomen-soft, nontender, no guarding or rigidity, bowel sounds heard  Extremities-no pedal edema  Peripheral pulses palpable  Musculoskeletal-no contractures  Central nervous system-no acute focal neurological deficit ,no sensory or motor deficit noted  Skin-no rash noted          Lab, Imaging and other studies: I have personally reviewed pertinent reports      VTE Pharmacologic Prophylaxis: Enoxaparin (Lovenox)  VTE Mechanical Prophylaxis: sequential compression device

## 2021-06-12 NOTE — ASSESSMENT & PLAN NOTE
Lab Results   Component Value Date    HGBA1C 11 0 (H) 06/11/2021       Recent Labs     06/12/21  0325 06/12/21  0559 06/12/21  0832 06/12/21  1204   POCGLU 109 96 109 170*       Blood Sugar Average: Last 72 hrs:  (P) 138 25 glucose 301  Patient is on metformin 500 mg p o  B i d  At home    Hold metformin  Start Lantus 8 units subcu HS  SSI  Repeat A1c

## 2021-06-12 NOTE — H&P (VIEW-ONLY)
Consultation - [facility]  Aman Rocha 45 y o  male MRN: [id number]  Unit/Bed#: -01 Encounter: [id number]              Inpatient consult to gastroenterology     Performed by  Marianne Navarrete MD     Authorized by Chicho Holder MD              Reason for Consult / Principal Problem:     Epigastric pain    ASSESSMENT AND PLAN:      1  Epigastric pain  Patient currently complaining of nearly 8/10 epigastric pain, mainly localized but sometimes also feels likely discharge her lysed  Associated with some degree of nausea and dry heaving however since coming to the hospital has not had any episodes of vomiting  This is also associated with watery bowel movements which happen almost every other day  Last bowel movement was yesterday  Patient is not passing any gas  Is able to tolerate diet  Pain quality or severity does not change with dietary intake  Denies any active vomiting, weight loss, hematemesis, BRBPR, melena, any family history of GI cancers  Prior to the admission patient complains that he had significant nausea and multiple episodes of vomiting  Denies any blood in vomitus or stool  Denies use of over-the-counter pain medications or NSAIDs  Active smoker  No history of aggressive alcohol use  Never had EGD or colonoscopy before  On presentation there was some concern of obstructive pattern on the CT scan however repeat KUB shows nonobstructive bowel gas pattern  Lipase normal x2  Plan:  -- continue protonix  -- differentials at this point include pain due to SBO vs  Peptic ulcer disease  -- EGD on Monday, please keep NPO from Sunday night  -- diet as tolerated    2  Small bowel obstruction  Patient has a history of small-bowel obstruction about 4 years ago requiring small-bowel resection    Also has had cholecystectomy and appendicectomy in the past   On this presentation CT scan did reveal some changes concerning for small bowel obstruction however patient is having soft and liquidy bowel movements at present  Is able to tolerate diet  Repeat KUB shows nonobstructive pattern  Colorectal surgery is on board  Likely the small-bowel obstruction is now relieved  ______________________________________________________________________    HPI:  Patient is a 58-year-old male with past medical history of small-bowel obstruction status post resection 4 years ago, status post cholecystectomy and appendicectomy, paroxysmal AFib, DM, CVA, active tobacco use who presented to the ED on 06/10/2021 with primary complaints of epigastric pain, nausea, vomiting and diarrhea for almost 3-4 days  Reports that the pain was initially generalized however now is concentrated mainly in the epigastric area, is around 8/10 on intensity, sharp in nature, nonradiating, feels like heaviness associated with bloating  Also reports that initially he felt sick every time he ate and almost ended up throwing up however the vomiting has improved since coming to the hospital   Reports watery bowel movements, 1 almost every other day  At baseline does have a bowel movement daily  Patient denies any fever, hematemesis, melena, BRBPR, any recent weight loss, any prior history of EGD or colonoscopy, any family history of GI cancers, any excessive use of over-the-counter pain medications or NSAIDs, aggressive alcohol use  REVIEW OF SYSTEMS:    CONSTITUTIONAL: Denies any fever, chills, rigors, and weight loss  HEENT: No earache or tinnitus  Denies hearing loss or visual disturbances  CARDIOVASCULAR: No chest pain or palpitations  RESPIRATORY: Denies any cough, hemoptysis, shortness of breath or dyspnea on exertion  GASTROINTESTINAL: As noted in the History of Present Illness  GENITOURINARY: No problems with urination  Denies any hematuria or dysuria  NEUROLOGIC: No dizziness or vertigo, denies headaches  MUSCULOSKELETAL: Denies any muscle or joint pain  SKIN: Denies skin rashes or itching  ENDOCRINE: Denies excessive thirst  Denies intolerance to heat or cold  PSYCHOSOCIAL: Denies depression or anxiety  Denies any recent memory loss  Historical Information   Past Medical History:   Diagnosis Date    [facility]     Diabetes mellitus ([facility] )     Hypertension     Stroke ([place] [address] )     x 2 in mid 30's     Past Surgical History:   Procedure Laterality Date    ABDOMINAL SURGERY      APPENDECTOMY      CHOLECYSTECTOMY      LAPAROSCOPIC LYSIS INTESTINAL ADHESIONS       Social History   Social History     Substance and Sexual Activity   Alcohol Use Not Currently    Frequency: Monthly or less    Drinks per session: 1 or 2     Social History     Substance and Sexual Activity   Drug Use Never     Social History     Tobacco Use   Smoking Status Current Every Day Smoker    Packs/day: 0 50    Types: Cigarettes   Smokeless Tobacco Never Used     History reviewed  No pertinent family history      Meds/Allergies     Medications Prior to Admission   Medication    losartan (COZAAR) 25 mg tablet    metFORMIN (GLUCOPHAGE) 500 mg tablet     Current Facility-Administered Medications   Medication Dose Route Frequency    acetaminophen (TYLENOL) tablet 650 mg  650 mg Oral Q6H PRN    aluminum-magnesium hydroxide-simethicone (MYLANTA) oral suspension 30 mL  30 mL Oral Q4H PRN    enoxaparin (LOVENOX) subcutaneous injection 40 mg  40 mg Subcutaneous Daily    famotidine (PEPCID) injection 20 mg  20 mg Intravenous Q12H [address]    HYDROmorphone (DILAUDID) injection 0 5 mg  0 5 mg Intravenous Q3H PRN    insulin glargine (LANTUS) subcutaneous injection 5 Units 0 05 mL  5 Units Subcutaneous HS    insulin lispro (HumaLOG) 100 units/mL subcutaneous injection 1-5 Units  1-5 Units Subcutaneous Q6H [address]    ketorolac (TORADOL) injection 15 mg  15 mg Intravenous Q6H PRN    lactated ringers infusion  100 mL/hr Intravenous Continuous    losartan (COZAAR) tablet 25 mg  25 mg Oral Daily    nicotine (NICODERM CQ) 14 mg/24hr TD 24 hr patch 1 patch  1 patch Transdermal Daily    ondansetron (ZOFRAN) injection 4 mg  4 mg Intravenous Q6H PRN    pantoprazole (PROTONIX) injection 40 mg  40 mg Intravenous Q12H [address]       No Known Allergies        Objective     Blood pressure 133/82, pulse 89, temperature 98 1 °F (36 7 °C), temperature source Tympanic, resp  rate 17, height 5' 7" (1 702 m), weight 83 4 kg (183 lb 13 8 oz), SpO2 96 %  Body mass index is 28 8 kg/m²  Intake/Output Summary (Last 24 hours) at 6/12/2021 1251  Last data filed at 6/12/2021 0101  Gross per 24 hour   Intake 1255 ml   Output 500 ml   Net 755 ml         PHYSICAL EXAM:      General Appearance:   Alert, cooperative, no distress   HEENT:   Normocephalic, atraumatic, anicteric      Neck:  Supple, symmetrical, trachea midline   Lungs:   Clear to auscultation bilaterally; no rales, rhonchi or wheezing; respirations unlabored    Heart[de-identified]   Regular rate and rhythm; no murmur, rub, or gallop     Abdomen:   Soft, tenderness in epigastric area on superficial as well as deep palpation, voluntary guarding noted, no rigidity, no distension, no overlying skin changes except from previous surgical scars   Genitalia:   Deferred    Rectal:   Deferred    Extremities:  No cyanosis, clubbing or edema    Pulses:  2+ and symmetric all extremities    Skin:  No jaundice, rashes, or lesions    Lymph nodes:  No palpable cervical lymphadenopathy        Lab Results:   Admission on 06/10/2021   Component Date Value    WBC 06/10/2021 8 23     RBC 06/10/2021 6 00*    Hemoglobin 06/10/2021 18 2*    Hematocrit 06/10/2021 50 9*    MCV 06/10/2021 85     MCH 06/10/2021 30 3     MCHC 06/10/2021 35 8     RDW 06/10/2021 12 5     MPV 06/10/2021 9 9     Platelets 71/69/5191 187     Neutrophils Relative 06/10/2021 63     Immat GRANS % 06/10/2021 0     Lymphocytes Relative 06/10/2021 27     Monocytes Relative 06/10/2021 9     Eosinophils Relative 06/10/2021 1     Basophils Relative 06/10/2021 0     Neutrophils Absolute 06/10/2021 5 12     Immature Grans Absolute 06/10/2021 0 03     Lymphocytes Absolute 06/10/2021 2 24     Monocytes Absolute 06/10/2021 0 71     Eosinophils Absolute 06/10/2021 0 11     Basophils Absolute 06/10/2021 0 02     Sodium 06/10/2021 136     Potassium 06/10/2021 4 0     Chloride 06/10/2021 100     CO2 06/10/2021 27     ANION GAP 06/10/2021 9     BUN 06/10/2021 16     Creatinine 06/10/2021 0 69     Glucose 06/10/2021 301*    Calcium 06/10/2021 9 3     AST 06/10/2021 19     ALT 06/10/2021 44     Alkaline Phosphatase 06/10/2021 84 2     Total Protein 06/10/2021 6 9     Albumin 06/10/2021 4 4     Total Bilirubin 06/10/2021 0 51     eGFR 06/10/2021 109     Lipase 06/10/2021 52     LACTIC ACID 06/10/2021 1 7     Color, UA 06/10/2021 Yellow     Clarity, UA 06/10/2021 Clear     Specific Gravity, UA 06/10/2021 >=1 030     pH, UA 06/10/2021 5 5     Leukocytes, UA 06/10/2021 Negative     Nitrite, UA 06/10/2021 Negative     Protein, UA 06/10/2021 1+*    Glucose, UA 06/10/2021 3+*    Ketones, UA 06/10/2021 Negative     Urobilinogen, UA 06/10/2021 0 2     Bilirubin, UA 06/10/2021 Negative     Blood, UA 06/10/2021 Trace-Intact*    Troponin I 06/10/2021 <0 03     RBC, UA 06/10/2021 1-2     WBC, UA 06/10/2021 None Seen     Epithelial Cells 06/10/2021 None Seen     Bacteria, UA 06/10/2021 None Seen     Ventricular Rate 06/10/2021 105     Atrial Rate 06/10/2021 105     PA Interval 06/10/2021 150     QRSD Interval 06/10/2021 86     QT Interval 06/10/2021 334     QTC Interval 06/10/2021 442     P Axis 06/10/2021 68     QRS Axis 06/10/2021 57     T Wave Axis 06/10/2021 70     POC Glucose 06/10/2021 189*    POC Glucose 06/10/2021 170*    POC Glucose 06/10/2021 144*    POC Glucose 06/11/2021 136     Hemoglobin A1C 06/11/2021 11 0*    EAG 06/11/2021 269     POC Glucose 06/11/2021 154*    POC Glucose 06/11/2021 150*    POC Glucose 06/11/2021 113     POC Glucose 06/11/2021 119     WBC 06/12/2021 6 77     RBC 06/12/2021 5 01     Hemoglobin 06/12/2021 15 3     Hematocrit 06/12/2021 44 1     MCV 06/12/2021 88     MCH 06/12/2021 30 5     MCHC 06/12/2021 34 7     RDW 06/12/2021 12 6     MPV 06/12/2021 10 0     Platelets 64/25/8490 176     Neutrophils Relative 06/12/2021 65     Immat GRANS % 06/12/2021 0     Lymphocytes Relative 06/12/2021 24     Monocytes Relative 06/12/2021 9     Eosinophils Relative 06/12/2021 2     Basophils Relative 06/12/2021 0     Neutrophils Absolute 06/12/2021 4 39     Immature Grans Absolute 06/12/2021 0 01     Lymphocytes Absolute 06/12/2021 1 62     Monocytes Absolute 06/12/2021 0 61     Eosinophils Absolute 06/12/2021 0 13     Basophils Absolute 06/12/2021 0 01     Sodium 06/12/2021 140     Potassium 06/12/2021 3 4*    Chloride 06/12/2021 106     CO2 06/12/2021 26     ANION GAP 06/12/2021 8     BUN 06/12/2021 14     Creatinine 06/12/2021 0 54     Glucose 06/12/2021 94     Calcium 06/12/2021 8 0*    eGFR 06/12/2021 121     LACTIC ACID 06/12/2021 0 5     POC Glucose 06/12/2021 109     POC Glucose 06/12/2021 96     POC Glucose 06/12/2021 109     Lipase 06/12/2021 9*    POC Glucose 06/12/2021 170*       Imaging Studies: I have personally reviewed pertinent imaging studies

## 2021-06-13 LAB
ANION GAP SERPL CALCULATED.3IONS-SCNC: 5 MMOL/L (ref 4–13)
BUN SERPL-MCNC: 8 MG/DL (ref 6–20)
CALCIUM SERPL-MCNC: 8.1 MG/DL (ref 8.4–10.2)
CHLORIDE SERPL-SCNC: 102 MMOL/L (ref 96–108)
CO2 SERPL-SCNC: 29 MMOL/L (ref 22–33)
CREAT SERPL-MCNC: 0.57 MG/DL (ref 0.5–1.2)
GFR SERPL CREATININE-BSD FRML MDRD: 118 ML/MIN/1.73SQ M
GLUCOSE SERPL-MCNC: 140 MG/DL (ref 65–140)
GLUCOSE SERPL-MCNC: 151 MG/DL (ref 65–140)
GLUCOSE SERPL-MCNC: 185 MG/DL (ref 65–140)
GLUCOSE SERPL-MCNC: 193 MG/DL (ref 65–140)
GLUCOSE SERPL-MCNC: 211 MG/DL (ref 65–140)
GLUCOSE SERPL-MCNC: 213 MG/DL (ref 65–140)
POTASSIUM SERPL-SCNC: 3.7 MMOL/L (ref 3.5–5)
SODIUM SERPL-SCNC: 136 MMOL/L (ref 133–145)

## 2021-06-13 PROCEDURE — 80048 BASIC METABOLIC PNL TOTAL CA: CPT | Performed by: INTERNAL MEDICINE

## 2021-06-13 PROCEDURE — 99223 1ST HOSP IP/OBS HIGH 75: CPT | Performed by: INTERNAL MEDICINE

## 2021-06-13 PROCEDURE — C9113 INJ PANTOPRAZOLE SODIUM, VIA: HCPCS | Performed by: INTERNAL MEDICINE

## 2021-06-13 PROCEDURE — 82948 REAGENT STRIP/BLOOD GLUCOSE: CPT

## 2021-06-13 PROCEDURE — 99233 SBSQ HOSP IP/OBS HIGH 50: CPT | Performed by: INTERNAL MEDICINE

## 2021-06-13 PROCEDURE — NC001 PR NO CHARGE: Performed by: INTERNAL MEDICINE

## 2021-06-13 RX ADMIN — INSULIN LISPRO 1 UNITS: 100 INJECTION, SOLUTION INTRAVENOUS; SUBCUTANEOUS at 00:42

## 2021-06-13 RX ADMIN — HYDROMORPHONE HYDROCHLORIDE 0.5 MG: 1 INJECTION, SOLUTION INTRAMUSCULAR; INTRAVENOUS; SUBCUTANEOUS at 13:58

## 2021-06-13 RX ADMIN — Medication 1 PATCH: at 09:16

## 2021-06-13 RX ADMIN — PANTOPRAZOLE SODIUM 40 MG: 40 INJECTION, POWDER, FOR SOLUTION INTRAVENOUS at 00:42

## 2021-06-13 RX ADMIN — HYDROMORPHONE HYDROCHLORIDE 0.5 MG: 1 INJECTION, SOLUTION INTRAMUSCULAR; INTRAVENOUS; SUBCUTANEOUS at 23:13

## 2021-06-13 RX ADMIN — HYDROMORPHONE HYDROCHLORIDE 0.5 MG: 1 INJECTION, SOLUTION INTRAMUSCULAR; INTRAVENOUS; SUBCUTANEOUS at 02:20

## 2021-06-13 RX ADMIN — INSULIN LISPRO 1 UNITS: 100 INJECTION, SOLUTION INTRAVENOUS; SUBCUTANEOUS at 23:18

## 2021-06-13 RX ADMIN — HYDROMORPHONE HYDROCHLORIDE 0.5 MG: 1 INJECTION, SOLUTION INTRAMUSCULAR; INTRAVENOUS; SUBCUTANEOUS at 06:02

## 2021-06-13 RX ADMIN — FAMOTIDINE 20 MG: 10 INJECTION, SOLUTION INTRAVENOUS at 09:15

## 2021-06-13 RX ADMIN — FAMOTIDINE 20 MG: 10 INJECTION, SOLUTION INTRAVENOUS at 21:46

## 2021-06-13 RX ADMIN — ONDANSETRON 4 MG: 2 INJECTION INTRAMUSCULAR; INTRAVENOUS at 09:24

## 2021-06-13 RX ADMIN — SODIUM CHLORIDE, SODIUM LACTATE, POTASSIUM CHLORIDE, AND CALCIUM CHLORIDE 100 ML/HR: .6; .31; .03; .02 INJECTION, SOLUTION INTRAVENOUS at 17:06

## 2021-06-13 RX ADMIN — HYDROMORPHONE HYDROCHLORIDE 0.5 MG: 1 INJECTION, SOLUTION INTRAMUSCULAR; INTRAVENOUS; SUBCUTANEOUS at 09:14

## 2021-06-13 RX ADMIN — HYDROMORPHONE HYDROCHLORIDE 0.5 MG: 1 INJECTION, SOLUTION INTRAMUSCULAR; INTRAVENOUS; SUBCUTANEOUS at 20:03

## 2021-06-13 RX ADMIN — ENOXAPARIN SODIUM 40 MG: 40 INJECTION SUBCUTANEOUS at 09:15

## 2021-06-13 RX ADMIN — PANTOPRAZOLE SODIUM 40 MG: 40 INJECTION, POWDER, FOR SOLUTION INTRAVENOUS at 09:15

## 2021-06-13 RX ADMIN — ONDANSETRON 4 MG: 2 INJECTION INTRAMUSCULAR; INTRAVENOUS at 23:12

## 2021-06-13 RX ADMIN — PANTOPRAZOLE SODIUM 40 MG: 40 INJECTION, POWDER, FOR SOLUTION INTRAVENOUS at 21:46

## 2021-06-13 RX ADMIN — INSULIN LISPRO 1 UNITS: 100 INJECTION, SOLUTION INTRAVENOUS; SUBCUTANEOUS at 06:12

## 2021-06-13 RX ADMIN — INSULIN GLARGINE 5 UNITS: 100 INJECTION, SOLUTION SUBCUTANEOUS at 23:13

## 2021-06-13 NOTE — PROGRESS NOTES
Patient still with unremitting pain  Plan EGD tomorrow  If that is unrevealing with transfer to [place] for Diagnostic/therapeutic Laparoscopy with working diagnosis of High Small Bowel Obstruction

## 2021-06-13 NOTE — PROGRESS NOTES
Progress Note- Aman Rocha 45 y o  male MRN: [id number]    Unit/Bed#: -01 Encounter: [id number]      Assessment and Plan:    1  Epigastric pain  Patient continues to have nearly 10/10 epigastric pain with severe tenderness and slight distension associated with 1 episode of vomiting this morning  As of now the differentials include pain due to small-bowel obstruction versus suspected peptic ulcer disease  Lipase has been normal   CT scan did not comment on any evidence of pancreatitis  Has a history of cholecystectomy and appendicectomy  On exam belly is soft but tenderness in epigastric area is noted common associated 1 regarding  No rigidity  Plan:  --diet as tolerated  --EGD tomorrow  --as per surgery if EGD is unremarkable might need diagnostic laparoscopy  --continue Protonix  --pain control as per primary team    2  Small-bowel obstruction  History of small-bowel obstruction before his ago requiring small-bowel resection  On presentation this time CT scan did show some evidence concerning for small-bowel obstruction  Repeat KUB abdominal x-ray showing nonobstructive patterns  General surgery following   ______________________________________________________________________    Subjective:     Patient seen and examined at bedside  Continues to have daily 10/10 epigastric pain with nausea and associated vomiting  Did have 1 episode of vomiting early this morning after breakfast   Moving bowels daily  Liquidy stool reported  Has remained afebrile, no acute overnight events      Medication Administration - last 24 hours from 06/12/2021 1126 to 06/13/2021 1126       Date/Time Order Dose Route Action Action by     06/13/2021 0915 losartan (COZAAR) tablet 25 mg 25 mg Oral Not Given Rita Krishna RN     06/12/2021 1812 lactated ringers infusion 100 mL/hr Intravenous New Bag Rita Krishna RN     06/13/2021 0924 ondansetron (ZOFRAN) injection 4 mg 4 mg Intravenous Given Rita Krishna RN 06/13/2021 0916 nicotine (NICODERM CQ) 14 mg/24hr TD 24 hr patch 1 patch 1 patch Transdermal Medication Applied Dorie Jin, DEEP     06/13/2021 0915 nicotine (NICODERM CQ) 14 mg/24hr TD 24 hr patch 1 patch 1 patch Transdermal Patch Removed Dorie Jin, DEEP     06/13/2021 0915 enoxaparin (LOVENOX) subcutaneous injection 40 mg 40 mg Subcutaneous Given Dorie Jin, DEEP     06/13/2021 0915 famotidine (PEPCID) injection 20 mg 20 mg Intravenous Given Dorie Jin, DEEP     06/12/2021 2127 famotidine (PEPCID) injection 20 mg 20 mg Intravenous Given Avis Landaverde RN     06/13/2021 0612 insulin lispro (HumaLOG) 100 units/mL subcutaneous injection 1-5 Units 1 Units Subcutaneous Given Avis Landaverde RN     06/13/2021 0042 insulin lispro (HumaLOG) 100 units/mL subcutaneous injection 1-5 Units 1 Units Subcutaneous Given Avis Landaverde RN     06/12/2021 1814 insulin lispro (HumaLOG) 100 units/mL subcutaneous injection 1-5 Units 1 Units Subcutaneous Given Dorie Jin RN     06/12/2021 1207 insulin lispro (HumaLOG) 100 units/mL subcutaneous injection 1-5 Units 1 Units Subcutaneous Not Given Dorie Jin, RN     06/12/2021 2135 insulin glargine (LANTUS) subcutaneous injection 5 Units 0 05 mL 5 Units Subcutaneous Given Avis Landaverde RN     06/13/2021 0914 HYDROmorphone (DILAUDID) injection 0 5 mg 0 5 mg Intravenous Given Dorie Jin RN     06/13/2021 0602 HYDROmorphone (DILAUDID) injection 0 5 mg 0 5 mg Intravenous Given Avis Landaverde RN     06/13/2021 0220 HYDROmorphone (DILAUDID) injection 0 5 mg 0 5 mg Intravenous Given Avis Landaverde RN     06/12/2021 2128 HYDROmorphone (DILAUDID) injection 0 5 mg 0 5 mg Intravenous Given Avis Landaverde RN     06/12/2021 1816 HYDROmorphone (DILAUDID) injection 0 5 mg 0 5 mg Intravenous Given Dorie Jin RN     06/12/2021 1347 HYDROmorphone (DILAUDID) injection 0 5 mg 0 5 mg Intravenous Given Dorie Jin RN     06/13/2021 0915 pantoprazole (PROTONIX) injection 40 mg 40 mg Intravenous Given Jacquiline Socrates, DEEP     06/13/2021 0042 pantoprazole (PROTONIX) injection 40 mg 40 mg Intravenous Given Gilberto Landis, DEEP     06/12/2021 1223 pantoprazole (PROTONIX) injection 40 mg 40 mg Intravenous Given Collinsquiline Socrates, DEEP          Objective:     Vitals: Blood pressure 128/81, pulse 75, temperature 97 6 °F (36 4 °C), temperature source Tympanic, resp  rate 16, height 5' 7" (1 702 m), weight 83 4 kg (183 lb 13 8 oz), SpO2 96 %  ,Body mass index is 28 8 kg/m²        Intake/Output Summary (Last 24 hours) at 6/13/2021 1126  Last data filed at 6/13/2021 0428  Gross per 24 hour   Intake 360 ml   Output 500 ml   Net -140 ml       Physical Exam:   General Appearance: Awake and alert, in no acute distress  Abdomen: Soft, severe epigastric tenderness, slightly distended, voluntary guarding, no rigidity    Invasive Devices     Peripheral Intravenous Line            Peripheral IV 06/10/21 Right Antecubital 3 days    Peripheral IV 06/12/21 Right;Ventral (anterior) Forearm <1 day                Lab Results:  Admission on 06/10/2021   Component Date Value    WBC 06/10/2021 8 23     RBC 06/10/2021 6 00*    Hemoglobin 06/10/2021 18 2*    Hematocrit 06/10/2021 50 9*    MCV 06/10/2021 85     MCH 06/10/2021 30 3     MCHC 06/10/2021 35 8     RDW 06/10/2021 12 5     MPV 06/10/2021 9 9     Platelets 40/83/1885 187     Neutrophils Relative 06/10/2021 63     Immat GRANS % 06/10/2021 0     Lymphocytes Relative 06/10/2021 27     Monocytes Relative 06/10/2021 9     Eosinophils Relative 06/10/2021 1     Basophils Relative 06/10/2021 0     Neutrophils Absolute 06/10/2021 5 12     Immature Grans Absolute 06/10/2021 0 03     Lymphocytes Absolute 06/10/2021 2 24     Monocytes Absolute 06/10/2021 0 71     Eosinophils Absolute 06/10/2021 0 11     Basophils Absolute 06/10/2021 0 02     Sodium 06/10/2021 136     Potassium 06/10/2021 4 0     Chloride 06/10/2021 100     CO2 06/10/2021 27     ANION GAP 06/10/2021 9     BUN 06/10/2021 16     Creatinine 06/10/2021 0 69     Glucose 06/10/2021 301*    Calcium 06/10/2021 9 3     AST 06/10/2021 19     ALT 06/10/2021 44     Alkaline Phosphatase 06/10/2021 84 2     Total Protein 06/10/2021 6 9     Albumin 06/10/2021 4 4     Total Bilirubin 06/10/2021 0 51     eGFR 06/10/2021 109     Lipase 06/10/2021 52     LACTIC ACID 06/10/2021 1 7     Color, UA 06/10/2021 Yellow     Clarity, UA 06/10/2021 Clear     Specific Gravity, UA 06/10/2021 >=1 030     pH, UA 06/10/2021 5 5     Leukocytes, UA 06/10/2021 Negative     Nitrite, UA 06/10/2021 Negative     Protein, UA 06/10/2021 1+*    Glucose, UA 06/10/2021 3+*    Ketones, UA 06/10/2021 Negative     Urobilinogen, UA 06/10/2021 0 2     Bilirubin, UA 06/10/2021 Negative     Blood, UA 06/10/2021 Trace-Intact*    Troponin I 06/10/2021 <0 03     RBC, UA 06/10/2021 1-2     WBC, UA 06/10/2021 None Seen     Epithelial Cells 06/10/2021 None Seen     Bacteria, UA 06/10/2021 None Seen     Ventricular Rate 06/10/2021 105     Atrial Rate 06/10/2021 105     SC Interval 06/10/2021 150     QRSD Interval 06/10/2021 86     QT Interval 06/10/2021 334     QTC Interval 06/10/2021 442     P Axis 06/10/2021 68     QRS Axis 06/10/2021 57     T Wave Axis 06/10/2021 70     POC Glucose 06/10/2021 189*    POC Glucose 06/10/2021 170*    POC Glucose 06/10/2021 144*    POC Glucose 06/11/2021 136     Hemoglobin A1C 06/11/2021 11 0*    EAG 06/11/2021 269     POC Glucose 06/11/2021 154*    POC Glucose 06/11/2021 150*    POC Glucose 06/11/2021 113     POC Glucose 06/11/2021 119     WBC 06/12/2021 6 77     RBC 06/12/2021 5 01     Hemoglobin 06/12/2021 15 3     Hematocrit 06/12/2021 44 1     MCV 06/12/2021 88     MCH 06/12/2021 30 5     MCHC 06/12/2021 34 7     RDW 06/12/2021 12 6     MPV 06/12/2021 10 0     Platelets 10/11/0887 176     Neutrophils Relative 06/12/2021 65     Immat GRANS % 06/12/2021 0     Lymphocytes Relative 06/12/2021 24     Monocytes Relative 06/12/2021 9     Eosinophils Relative 06/12/2021 2     Basophils Relative 06/12/2021 0     Neutrophils Absolute 06/12/2021 4 39     Immature Grans Absolute 06/12/2021 0 01     Lymphocytes Absolute 06/12/2021 1 62     Monocytes Absolute 06/12/2021 0 61     Eosinophils Absolute 06/12/2021 0 13     Basophils Absolute 06/12/2021 0 01     Sodium 06/12/2021 140     Potassium 06/12/2021 3 4*    Chloride 06/12/2021 106     CO2 06/12/2021 26     ANION GAP 06/12/2021 8     BUN 06/12/2021 14     Creatinine 06/12/2021 0 54     Glucose 06/12/2021 94     Calcium 06/12/2021 8 0*    eGFR 06/12/2021 121     LACTIC ACID 06/12/2021 0 5     POC Glucose 06/12/2021 109     POC Glucose 06/12/2021 96     POC Glucose 06/12/2021 109     Lipase 06/12/2021 9*    POC Glucose 06/12/2021 170*    Ventricular Rate 06/10/2021 105     Atrial Rate 06/10/2021 105     AL Interval 06/10/2021 150     QRSD Interval 06/10/2021 86     QT Interval 06/10/2021 334     QTC Interval 06/10/2021 442     P Axis 06/10/2021 68     QRS Axis 06/10/2021 57     T Wave Axis 06/10/2021 70     POC Glucose 06/12/2021 200*    POC Glucose 06/12/2021 223*    POC Glucose 06/13/2021 211*    Sodium 06/13/2021 136     Potassium 06/13/2021 3 7     Chloride 06/13/2021 102     CO2 06/13/2021 29     ANION GAP 06/13/2021 5     BUN 06/13/2021 8     Creatinine 06/13/2021 0 57     Glucose 06/13/2021 185*    Calcium 06/13/2021 8 1*    eGFR 06/13/2021 118     POC Glucose 06/13/2021 193*       Imaging Studies: I have personally reviewed pertinent imaging studies

## 2021-06-13 NOTE — PROGRESS NOTES
Leslye U  65   Progress Note - Aman Rocha 1970, 45 y o  male MRN: [id number]  Unit/Bed#: -01 Encounter: [id number]  Primary Care Provider: No primary care provider on file  Date and time admitted to hospital: 6/10/2021  9:50 AM    * Small bowel obstruction [facility])  Assessment & Plan  Patient presents with abdominal pain nausea vomiting diarrhea for 3-4 days  CT abdomen pelvis with contrast showed - Mild proximal small bowel dilatation with questionable transition within the left midabdomen some degree of underlying low-grade obstruction not entirely excluded  History of bowel obstruction with lysis of adhesion about 4 years ago  Patient seen by surgery in ED, appreciate input          06/11/2021  Patient states his pain is getting worse, complains of nausea, no vomiting  Patient states he had small bowel movement this morning, no further episodes of bowel movement  Patient states he is not passing gas  Patient is stating that current pain medication not helping him  Obtain x-ray of the abdomen, reached out to General surgery  Will follow-up recommendations continue NPO     06/11/2021 at 12:10 p m , received call from general surgeon Dr Carole Escoto, no need of any acute surgical intervention at this point, continue NPO and IV medication and fluids  Repeat x-ray was reviewed shows no obstruction  Lipase was repeated which was normal   Plan was discussed with surgical team   Pain is in the epigastric region and likely gastritis versus peptic ulcer disease  GI input was obtained  If persists to have pain will be scheduled for EGD on Monday  Patient has been started on clear liquid diet and will monitor for now  6/13-X-ray was reviewed with the Dr Kael Osorio who thinks patient has bowel distension and possible obstruction  Patient is on clear liquids able to tolerate however has abdominal pain periumbilical region    Patient is scheduled for EGD on Monday if negative findings on EGD patient will need to be transferred to [facility] for laparoscopic evaluation     Nicotine abuse  Assessment & Plan  Smoking cessation, nicotine patch    History of CVA (cerebrovascular accident)  Assessment & Plan  History of CVA x 2 in his 35s  Likely secondary to paroxysmal AFib  With resultant mild right-sided weakness  Stop taking Pradaxa for months  Not on statin at home  Once patient is surgically cleared will consider starting on systemic anticoagulation Eliquis or Xarelto given his prior history of strokes  And then outpatient follow-up with Cardiology and PCP  Paroxysmal atrial fibrillation [facility])  Assessment & Plan  Patient stopped taking medications for months  Recently moved to the area in November last year, does not have a PCP or cardiologist in area  Reports feeling palpitation about 2 times a day  EKG today showed sinus tach, rate 105  Place patient on telemetry  Optimize electrolytes  Recommend referral to cardiology on discharge    Currently patient in sinus rhythm  Type 2 diabetes mellitus, without long-term current use of insulin [facility])  Assessment & Plan  Lab Results   Component Value Date    HGBA1C 11 0 (H) 06/11/2021       Recent Labs     06/12/21  2125 06/13/21  0011 06/13/21  0607 06/13/21  1211   POCGLU 223* 211* 193* 140       Blood Sugar Average: Last 72 hrs:  (P) 154 [id number] glucose 301  Patient is on metformin 500 mg p o  B i d  At home  Hold metformin  Start Lantus 8 units subcu HS  SSI  Repeat A1c    Hypertension  Assessment & Plan  Continue losartan with holding parameter  BP stable          Subjective/Objective     Subjective:   Patient complains of periumbilical abdominal pain intensity of 6 to 7/10  No nausea or vomiting  Patient had diarrhea this morning  Able to tolerate liquid diet  Patient is scheduled for EGD in a        Objective:  Vitals: Blood pressure 128/81, pulse 75, temperature 97 6 °F (36 4 °C), temperature source Tympanic, resp  rate 16, height 5' 7" (1 702 m), weight 83 4 kg (183 lb 13 8 oz), SpO2 96 %  ,Body mass index is 28 8 kg/m²  Intake/Output Summary (Last 24 hours) at 6/13/2021 1247  Last data filed at 6/13/2021 0428  Gross per 24 hour   Intake 360 ml   Output 500 ml   Net -140 ml       Invasive Devices     Peripheral Intravenous Line            Peripheral IV 06/10/21 Right Antecubital 3 days    Peripheral IV 06/12/21 Right;Ventral (anterior) Forearm <1 day                Physical Exam:   HEENT-PERRLA, moist oral mucosa  Neck-supple, no JVD elevation   Respiratory-equal air entry bilaterally, no rales or rhonchi  Cardiovascular system-S1, S2 heard, no murmur or gallops or rubs  Abdomen-soft, nontender, no guarding or rigidity, bowel sounds heard  Extremities-no pedal edema  Peripheral pulses palpable  Musculoskeletal-no contractures  Central nervous system-no acute focal neurological deficit ,no sensory or motor deficit noted  Skin-no rash noted          Lab, Imaging and other studies: I have personally reviewed pertinent reports      VTE Pharmacologic Prophylaxis: Enoxaparin (Lovenox)  VTE Mechanical Prophylaxis: sequential compression device

## 2021-06-13 NOTE — ASSESSMENT & PLAN NOTE
Lab Results   Component Value Date    HGBA1C 11 0 (H) 06/11/2021       Recent Labs     06/12/21  2125 06/13/21  0011 06/13/21  0607 06/13/21  1211   POCGLU 223* 211* 193* 140       Blood Sugar Average: Last 72 hrs:  (P) 154 [id number] glucose 301  Patient is on metformin 500 mg p o  B i d  At home    Hold metformin  Start Lantus 8 units subcu HS  SSI  Repeat A1c

## 2021-06-13 NOTE — ASSESSMENT & PLAN NOTE
Patient presents with abdominal pain nausea vomiting diarrhea for 3-4 days  CT abdomen pelvis with contrast showed - Mild proximal small bowel dilatation with questionable transition within the left midabdomen some degree of underlying low-grade obstruction not entirely excluded  History of bowel obstruction with lysis of adhesion about 4 years ago  Patient seen by surgery in ED, appreciate input          06/11/2021  Patient states his pain is getting worse, complains of nausea, no vomiting  Patient states he had small bowel movement this morning, no further episodes of bowel movement  Patient states he is not passing gas  Patient is stating that current pain medication not helping him  Obtain x-ray of the abdomen, reached out to General surgery  Will follow-up recommendations continue NPO     06/11/2021 at 12:10 p m , received call from general surgeon Dr Kings Martinez, no need of any acute surgical intervention at this point, continue NPO and IV medication and fluids  Repeat x-ray was reviewed shows no obstruction  Lipase was repeated which was normal   Plan was discussed with surgical team   Pain is in the epigastric region and likely gastritis versus peptic ulcer disease  GI input was obtained  If persists to have pain will be scheduled for EGD on Monday  Patient has been started on clear liquid diet and will monitor for now  6/13-X-ray was reviewed with the Dr Alirio Stone who thinks patient has bowel distension and possible obstruction  Patient is on clear liquids able to tolerate however has abdominal pain periumbilical region    Patient is scheduled for EGD on Monday if negative findings on EGD patient will need to be transferred to [facility] for laparoscopic evaluation

## 2021-06-14 ENCOUNTER — APPOINTMENT (INPATIENT)
Dept: GASTROENTEROLOGY | Facility: HOSPITAL | Age: 51
DRG: 389 | End: 2021-06-14
Payer: COMMERCIAL

## 2021-06-14 ENCOUNTER — ANESTHESIA (INPATIENT)
Dept: GASTROENTEROLOGY | Facility: HOSPITAL | Age: 51
DRG: 389 | End: 2021-06-14
Payer: COMMERCIAL

## 2021-06-14 ENCOUNTER — HOSPITAL ENCOUNTER (INPATIENT)
Facility: HOSPITAL | Age: 51
LOS: 3 days | Discharge: HOME | DRG: 337 | End: 2021-06-17
Attending: SURGERY | Admitting: SURGERY
Payer: COMMERCIAL

## 2021-06-14 ENCOUNTER — ANESTHESIA EVENT (INPATIENT)
Dept: GASTROENTEROLOGY | Facility: HOSPITAL | Age: 51
DRG: 389 | End: 2021-06-14
Payer: COMMERCIAL

## 2021-06-14 ENCOUNTER — ANESTHESIA EVENT (INPATIENT)
Dept: PERIOP | Facility: HOSPITAL | Age: 51
DRG: 337 | End: 2021-06-14
Payer: COMMERCIAL

## 2021-06-14 VITALS
OXYGEN SATURATION: 95 % | HEIGHT: 66 IN | WEIGHT: 175 LBS | RESPIRATION RATE: 20 BRPM | TEMPERATURE: 96.8 F | BODY MASS INDEX: 28.12 KG/M2 | HEART RATE: 72 BPM | DIASTOLIC BLOOD PRESSURE: 68 MMHG | SYSTOLIC BLOOD PRESSURE: 104 MMHG

## 2021-06-14 DIAGNOSIS — R73.9 HYPERGLYCEMIA: ICD-10-CM

## 2021-06-14 DIAGNOSIS — K56.609 SMALL BOWEL OBSTRUCTION (HCC): Primary | ICD-10-CM

## 2021-06-14 DIAGNOSIS — IMO0002 UNCONTROLLED DIABETES MELLITUS: ICD-10-CM

## 2021-06-14 LAB
ABO GROUP BLD: NORMAL
ABO GROUP BLD: NORMAL
ANION GAP SERPL CALCULATED.3IONS-SCNC: 4 MMOL/L (ref 4–13)
BLD GP AB SCN SERPL QL: NEGATIVE
BUN SERPL-MCNC: 6 MG/DL (ref 6–20)
CALCIUM SERPL-MCNC: 8.3 MG/DL (ref 8.4–10.2)
CHLORIDE SERPL-SCNC: 105 MMOL/L (ref 96–108)
CO2 SERPL-SCNC: 30 MMOL/L (ref 22–33)
CREAT SERPL-MCNC: 0.59 MG/DL (ref 0.5–1.2)
ERYTHROCYTE [DISTWIDTH] IN BLOOD BY AUTOMATED COUNT: 12.3 % (ref 11.6–15.1)
GFR SERPL CREATININE-BSD FRML MDRD: 116 ML/MIN/1.73SQ M
GLUCOSE SERPL-MCNC: 127 MG/DL (ref 65–140)
GLUCOSE SERPL-MCNC: 128 MG/DL (ref 65–140)
GLUCOSE SERPL-MCNC: 132 MG/DL (ref 65–140)
HCT VFR BLD AUTO: 42 % (ref 36.5–49.3)
HGB BLD-MCNC: 14.7 G/DL (ref 12–17)
MCH RBC QN AUTO: 30.5 PG (ref 26.8–34.3)
MCHC RBC AUTO-ENTMCNC: 35 G/DL (ref 31.4–37.4)
MCV RBC AUTO: 87 FL (ref 82–98)
PLATELET # BLD AUTO: 156 THOUSANDS/UL (ref 149–390)
PLATELET # BLD AUTO: 172 THOUSANDS/UL (ref 149–390)
PMV BLD AUTO: 10 FL (ref 8.9–12.7)
PMV BLD AUTO: 9.5 FL (ref 8.9–12.7)
POTASSIUM SERPL-SCNC: 3.5 MMOL/L (ref 3.5–5)
RBC # BLD AUTO: 4.82 MILLION/UL (ref 3.88–5.62)
RH BLD: POSITIVE
RH BLD: POSITIVE
SODIUM SERPL-SCNC: 139 MMOL/L (ref 133–145)
SPECIMEN EXPIRATION DATE: NORMAL
WBC # BLD AUTO: 4.65 THOUSAND/UL (ref 4.31–10.16)

## 2021-06-14 PROCEDURE — 82948 REAGENT STRIP/BLOOD GLUCOSE: CPT

## 2021-06-14 PROCEDURE — 85027 COMPLETE CBC AUTOMATED: CPT | Performed by: INTERNAL MEDICINE

## 2021-06-14 PROCEDURE — 86900 BLOOD TYPING SEROLOGIC ABO: CPT | Performed by: STUDENT IN AN ORGANIZED HEALTH CARE EDUCATION/TRAINING PROGRAM

## 2021-06-14 PROCEDURE — 80048 BASIC METABOLIC PNL TOTAL CA: CPT | Performed by: INTERNAL MEDICINE

## 2021-06-14 PROCEDURE — 85049 AUTOMATED PLATELET COUNT: CPT | Performed by: STUDENT IN AN ORGANIZED HEALTH CARE EDUCATION/TRAINING PROGRAM

## 2021-06-14 PROCEDURE — 86901 BLOOD TYPING SEROLOGIC RH(D): CPT | Performed by: STUDENT IN AN ORGANIZED HEALTH CARE EDUCATION/TRAINING PROGRAM

## 2021-06-14 PROCEDURE — 86850 RBC ANTIBODY SCREEN: CPT | Performed by: STUDENT IN AN ORGANIZED HEALTH CARE EDUCATION/TRAINING PROGRAM

## 2021-06-14 PROCEDURE — 43235 EGD DIAGNOSTIC BRUSH WASH: CPT | Performed by: INTERNAL MEDICINE

## 2021-06-14 PROCEDURE — 99239 HOSP IP/OBS DSCHRG MGMT >30: CPT | Performed by: INTERNAL MEDICINE

## 2021-06-14 PROCEDURE — C9113 INJ PANTOPRAZOLE SODIUM, VIA: HCPCS | Performed by: INTERNAL MEDICINE

## 2021-06-14 PROCEDURE — 0DJ08ZZ INSPECTION OF UPPER INTESTINAL TRACT, VIA NATURAL OR ARTIFICIAL OPENING ENDOSCOPIC: ICD-10-PCS | Performed by: INTERNAL MEDICINE

## 2021-06-14 RX ORDER — PROPOFOL 10 MG/ML
INJECTION, EMULSION INTRAVENOUS AS NEEDED
Status: DISCONTINUED | OUTPATIENT
Start: 2021-06-14 | End: 2021-06-14

## 2021-06-14 RX ORDER — HEPARIN SODIUM 5000 [USP'U]/ML
5000 INJECTION, SOLUTION INTRAVENOUS; SUBCUTANEOUS EVERY 8 HOURS SCHEDULED
Status: DISCONTINUED | OUTPATIENT
Start: 2021-06-14 | End: 2021-06-17 | Stop reason: HOSPADM

## 2021-06-14 RX ORDER — HYDROMORPHONE HCL/PF 1 MG/ML
0.5 SYRINGE (ML) INJECTION
Status: DISCONTINUED | OUTPATIENT
Start: 2021-06-14 | End: 2021-06-15

## 2021-06-14 RX ORDER — SODIUM CHLORIDE 9 MG/ML
125 INJECTION, SOLUTION INTRAVENOUS CONTINUOUS
Status: DISCONTINUED | OUTPATIENT
Start: 2021-06-14 | End: 2021-06-16

## 2021-06-14 RX ORDER — HYDRALAZINE HYDROCHLORIDE 20 MG/ML
5 INJECTION INTRAMUSCULAR; INTRAVENOUS EVERY 6 HOURS PRN
Status: DISCONTINUED | OUTPATIENT
Start: 2021-06-14 | End: 2021-06-17 | Stop reason: HOSPADM

## 2021-06-14 RX ORDER — NICOTINE 21 MG/24HR
1 PATCH, TRANSDERMAL 24 HOURS TRANSDERMAL DAILY
Status: DISCONTINUED | OUTPATIENT
Start: 2021-06-15 | End: 2021-06-17 | Stop reason: HOSPADM

## 2021-06-14 RX ORDER — ONDANSETRON 2 MG/ML
4 INJECTION INTRAMUSCULAR; INTRAVENOUS EVERY 6 HOURS PRN
Status: DISCONTINUED | OUTPATIENT
Start: 2021-06-14 | End: 2021-06-17 | Stop reason: HOSPADM

## 2021-06-14 RX ADMIN — PROPOFOL 200 MG: 10 INJECTION, EMULSION INTRAVENOUS at 13:43

## 2021-06-14 RX ADMIN — SODIUM CHLORIDE 125 ML/HR: 0.9 INJECTION, SOLUTION INTRAVENOUS at 22:53

## 2021-06-14 RX ADMIN — PROPOFOL 50 MG: 10 INJECTION, EMULSION INTRAVENOUS at 13:44

## 2021-06-14 RX ADMIN — HYDROMORPHONE HYDROCHLORIDE 0.5 MG: 1 INJECTION, SOLUTION INTRAMUSCULAR; INTRAVENOUS; SUBCUTANEOUS at 10:17

## 2021-06-14 RX ADMIN — PROPOFOL 100 MG: 10 INJECTION, EMULSION INTRAVENOUS at 13:46

## 2021-06-14 RX ADMIN — HYDROMORPHONE HYDROCHLORIDE 0.5 MG: 1 INJECTION, SOLUTION INTRAMUSCULAR; INTRAVENOUS; SUBCUTANEOUS at 17:03

## 2021-06-14 RX ADMIN — PANTOPRAZOLE SODIUM 40 MG: 40 INJECTION, POWDER, FOR SOLUTION INTRAVENOUS at 10:20

## 2021-06-14 RX ADMIN — FAMOTIDINE 20 MG: 10 INJECTION, SOLUTION INTRAVENOUS at 10:20

## 2021-06-14 RX ADMIN — HEPARIN SODIUM 5000 UNITS: 5000 INJECTION INTRAVENOUS; SUBCUTANEOUS at 22:53

## 2021-06-14 RX ADMIN — Medication 1 PATCH: at 17:03

## 2021-06-14 RX ADMIN — HYDROMORPHONE HYDROCHLORIDE 0.5 MG: 1 INJECTION, SOLUTION INTRAMUSCULAR; INTRAVENOUS; SUBCUTANEOUS at 05:07

## 2021-06-14 RX ADMIN — ONDANSETRON 4 MG: 2 INJECTION INTRAMUSCULAR; INTRAVENOUS at 05:08

## 2021-06-14 RX ADMIN — HYDROMORPHONE HYDROCHLORIDE 0.5 MG: 1 INJECTION, SOLUTION INTRAMUSCULAR; INTRAVENOUS; SUBCUTANEOUS at 22:54

## 2021-06-14 NOTE — UTILIZATION REVIEW
Inpatient Admission Authorization Request   NOTIFICATION OF INPATIENT ADMISSION/INPATIENT AUTHORIZATION REQUEST   SERVICING FACILITY:   [address]  [id number] [place] [address], [address] [place] [address]  Tax ID: [id number]  NPI: [id number]  Place of Service: Inpatient [address] [facility]  [address]  Place of Service Code: 24     ATTENDING PROVIDER:  Attending Name and NPI#: Vannessa Gambino, [address] [[id number]]  Address: [address] [place] [address], [address] [place] [address]  Phone:  [phone]     UTILIZATION REVIEW CONTACT:  Jaron Miguel Utilization   Network Utilization Review Department  Phone: [phone]  Fax: [phone]  Email: [email or web address]     PHYSICIAN ADVISORY SERVICES:  FOR [id number] REVIEW - MEDICAL NECESSITY DENIAL  Phone: [phone]  Fax: [phone]  Email: [email or web address]     TYPE OF REQUEST:  Inpatient Status     ADMISSION INFORMATION:  ADMISSION DATE/TIME: 6/11/21 12:10 PM  PATIENT DIAGNOSIS CODE/DESCRIPTION:  SBO (small bowel obstruction) ([facility] ) [K56 609]  Abdominal pain [R10 9]  Generalized abdominal pain [R10 84]  Hyperglycemia due to diabetes mellitus ([facility] ) [E11 65]  DISCHARGE DATE/TIME: No discharge date for patient encounter  DISCHARGE DISPOSITION (IF DISCHARGED): Home/Self Care     IMPORTANT INFORMATION:  Please contact the Jaron Miguel directly with any questions or concerns regarding this request  Department voicemails are confidential     Send requests for admission clinical reviews, concurrent reviews, approvals, and administrative denials due to lack of clinical to fax [phone]           Initial Clinical Review    Admission: Date/Time/Statement: 6/10/21 AT 1154 OBSERVATION - CONVERTED TO INPATIENT 6/11/21 AT 1210 2ND SMALL BOWEL OBSTRUCTION WITH WORSENING PAIN - REQUIRES CONTINUED INPATIENT TREATMENT WITH NPO, IVF, IV ANALGESICS + ANTIEMETICS PRN - DESPITE > 24 HOURS OBSERVATION     06/11/21 1211  Inpatient Admission Once     Question Answer Comment   Level of Care Med Surg    Estimated length of stay More than 2 Midnights    Certification I certify that inpatient services are medically necessary for this patient for a duration of greater than two midnights  See H&P and MD Progress Notes for additional information about the patient's course of treatment  06/11/21 1210     ED Arrival Information     Expected Arrival Acuity Service Admission Type    - 6/10/2021 09:41 Urgent General Medicine Urgent    Arrival Complaint    abdominal pain     Chief Complaint   Patient presents with    Abdominal Pain     Pt reports abd pain, vomiting and diarrhea x 3 days  Initial Presentation:   46 y/o male with PMHx HTN, CVA, paroxysmal atrial fibrillation, DM2, bowel obstruction with surgery 4 years prior, appendectomy, cholecystectomy, lysis of intestinal adhesions, nicotine abuse  Presented urgently to  [facility] ED on 6/9/21 2nd 3-4 day history of  diffuse constant upper abdominal pain "feeling bloated with abdominal heaviness" with nausea vomiting and diarrhea  Reports feeling sick every time after eating and then vomiting  about 5-6 times with watery diarrhea 3-4 times per day  Also reports high blood sugar of   400 yesterday and urinating a lot  In ED - Temp 98 4    /107  Exam - Abdomen distended, diffuse tenderness, diminished bowel sounds  CT revealed findings consistent with a 1 mild small bowel obstruction with transition point in the left mid abdomen  Labs:    ED Tx: NPO, IVF x 1 L, IV MS, IV Dilaudid x 2, IV Zofran    Placed in Observation 6/10/21 at  and converted to Inpatient 6/11/21 at 1210 2nd Small Bowel Obstruction with worsening pain - requires continued NPO, IVF, IV analgesics + antiemetics prn      6/9 Colorectal:  Impression:  Low-grade small-bowel obstruction  Plan: follow-up abdominal x-ray in the morning  Continue NPO, IVF  and IV medication       6/11/21 - Day 2:   Reports abdominal pain is getting worse, complains of nausea, no vomiting  States he had small bowel movement this morning, no further episodes of bowel movement  not passing gas  Current pain medication not helping him  Obtain x-ray of the abdomen, reached out to General surgery      Continue NPO      2021 at 12:10 p m , received call from general surgeon Dr Kike Lucero, no need of any acute surgical intervention at this point, continue NPO and IV medication and fluids          ED Triage Vitals   Temperature Pulse Respirations Blood Pressure SpO2   06/10/21 0946 06/10/21 0946 06/10/21 0946 06/10/21 0946 06/10/21 0946   98 4 °F (36 9 °C) (!) 111 18 (!) 149/107 95 %      Temp Source Heart Rate Source Patient Position - Orthostatic VS BP Location FiO2 (%)   06/10/21 0946 06/10/21 0946 06/10/21 1115 06/10/21 1115 --   Tympanic Monitor Sitting Left arm       Pain Score       06/10/21 0946       8          Wt Readings from Last 1 Encounters:   06/10/21 83 4 kg (183 lb 13 8 oz)     Additional Vital Signs:   Temp (24hrs), Av 8 °F (36 6 °C), Min:97 4 °F (36 3 °C), Max:98 1 °F (36 7 °C)   Temp:  [97 4 °F (36 3 °C)-98 1 °F (36 7 °C)] 97 5 °F (36 4 °C)  HR:  [77-86] 81  Resp:  [16-18] 18  BP: (114-138)/(64-88) 128/83  SpO2:  [96 %-99 %] 96 %  Body mass index is 28 8 kg/m²       Intake/Output Summary (Last 24 hours) at 2021 1211:  Gross per 24 hour   Intake 2000 ml   Output 200 ml   Net 1800 ml      Pertinent Labs/Diagnostic Test Results:     Results from last 7 days   Lab Units 21  0604 21  0549 06/10/21  1002   WBC Thousand/uL 4 65 6 77 8 23   HEMOGLOBIN g/dL 14 7 15 3 18 2*   HEMATOCRIT % 42 0 44 1 50 9*   PLATELETS Thousands/uL 156 176 187   NEUTROS ABS Thousands/µL  --  4 39 5 12       Results from last 7 days   Lab Units 21  0604 21  0622 21  0549 06/10/21  1002   SODIUM mmol/L 139 136 140 136   POTASSIUM mmol/L 3 5 3 7 3 4* 4 0   CHLORIDE mmol/L 105 102 106 100   CO2 mmol/L 30 29 26 27   ANION GAP mmol/L 4 5 8 9   BUN mg/dL 6 8 14 16   CREATININE mg/dL 0 59 0 57 0 54 0 69   EGFR ml/min/1 73sq m 116 118 121 109   CALCIUM mg/dL 8 3* 8 1* 8 0* 9 3     Results from last 7 days   Lab Units 06/10/21  1002   AST U/L 19   ALT U/L 44   ALK PHOS U/L 84 2   TOTAL PROTEIN g/dL 6 9   ALBUMIN g/dL 4 4   TOTAL BILIRUBIN mg/dL 0 51     Results from last 7 days   Lab Units 06/14/21  0609 06/13/21  2312 06/13/21  1709 06/13/21  1211 06/13/21  0607 06/13/21  0011 06/12/21  2125 06/12/21  1659 06/12/21  1204 06/12/21  0832 06/12/21  0559 06/12/21  0325   POC GLUCOSE mg/dl 128 213* 151* 140 193* 211* 223* 200* 170* 109 96 109     Results from last 7 days   Lab Units 06/14/21  0604 06/13/21  0622 06/12/21  0549 06/10/21  1002   GLUCOSE RANDOM mg/dL 127 185* 94 301*      Results from last 7 days   Lab Units 06/10/21  1002   TROPONIN I ng/mL <0 03     Results from last 7 days   Lab Units 06/12/21  0549 06/10/21  1002   LACTIC ACID mmol/L 0 5 1 7     Results from last 7 days   Lab Units 06/12/21  0549 06/10/21  1002   LIPASE u/L 9* 52       Results from last 7 days   Lab Units 06/10/21  1004   CLARITY UA  Clear   COLOR UA  Yellow   SPEC GRAV UA  >=1 030   PH UA  5 5   GLUCOSE UA mg/dl 3+*   KETONES UA mg/dl Negative   BLOOD UA  Trace-Intact*   PROTEIN UA mg/dl 1+*   NITRITE UA  Negative   BILIRUBIN UA  Negative   UROBILINOGEN UA E U /dl 0 2   LEUKOCYTES UA  Negative   WBC UA /hpf None Seen   RBC UA /hpf 1-2   BACTERIA UA /hpf None Seen   EPITHELIAL CELLS WET PREP /hpf None Seen     CT ABDOMEN AND PELVIS WITH IV CONTRAST  (Per MD):  Mild proximal small bowel dilatation with transition within the left midabdomen     ED Treatment:   Medication Administration from 06/10/2021 0941 to 06/10/2021 1340       Date/Time Order Dose Route Action     06/10/2021 1008 sodium chloride 0 9 % bolus 1,000 mL 1,000 mL Intravenous New Bag     06/10/2021 1008 ondansetron (ZOFRAN) injection 4 mg 4 mg Intravenous Given     06/10/2021 1009 morphine (PF) 4 mg/mL injection 4 mg 4 mg Intravenous Given     06/10/2021 1116 iohexol (OMNIPAQUE) 350 MG/ML injection (SINGLE-DOSE) 100 mL 100 mL Intravenous Given     06/10/2021 1130 HYDROmorphone (DILAUDID) injection 1 mg 1 mg Intravenous Given     06/10/2021 1301 HYDROmorphone (DILAUDID) injection 0 5 mg 0 5 mg Intravenous Given     Past Medical History:   Diagnosis Date    A-fib ([facility] )     Diabetes mellitus ([place] [address] )     Hypertension     Stroke ([place] [address] )     x 2 in mid 30's     Present on Admission:   Hypertension    Admitting Diagnosis: SBO (small bowel obstruction) ([facility]) [K56 609]  Abdominal pain [R10 9]  Generalized abdominal pain [R10 84]  Hyperglycemia due to diabetes mellitus ([place] [address] ) [E11 65]    Age/Sex: 45 y o  male    Admission Orders:  Telemetry  VS q4hrs  Continuous Pulse Oximetry  Up + OOB with assistance  NPO  BBG q6hrs   I+O q shift    Scheduled Medications:  enoxaparin, 40 mg, Subcutaneous, Daily  famotidine, 20 mg, Intravenous, Q12H BERNARD  insulin glargine, 5 Units, Subcutaneous, HS  insulin lispro, 1-5 Units, Subcutaneous, Q6H BERNARD  losartan, 25 mg, Oral, Daily  nicotine, 1 patch, Transdermal, Daily    Continuous IV Infusions:  IVF lactated ringers, 100 mL/hr, Intravenous, Continuous    PRN Meds:  acetaminophen, 650 mg, Oral, Q6H PRN  IV HYDROmorphone, 0 5 mg, Intravenous, Q3H PRN GIVEN X 3  IV ketorolac, 15 mg, Intravenous, Q6H PRN GIVEN X 2  IV ondansetron, 4 mg, Intravenous, Q6H PRN GIVEN X 1    Network Utilization Review Department  ATTENTION: Please call with any questions or concerns to [phone] and carefully listen to the prompts so that you are directed to the right person  All voicemails are confidential   Sudie Crigler all requests for admission clinical reviews, approved or denied determinations and any other requests to dedicated fax number below belonging to the campus where the patient is receiving treatment   List of dedicated fax numbers for the Facilities:  FACILITY NAME UR FAX NUMBER   ADMISSION DENIALS (Administrative/Medical Necessity) [phone]   [address] (Maternity/NICU/Pediatrics) [address] [facility] [address] [place] [address]  [phone]   Baylee Phelps [address] [address] [place] [address] [address] [facility] [address] [phone]

## 2021-06-14 NOTE — ANESTHESIA POSTPROCEDURE EVALUATION
Post-Op Assessment Note    CV Status:  Stable  Pain Score: 0    Pain management: adequate     Mental Status:  Alert and awake   Hydration Status:  Euvolemic   PONV Controlled:  Controlled   Airway Patency:  Patent      Post Op Vitals Reviewed: Yes      Staff: CRNA, Anesthesiologist   Comments: vss        No complications documented      BP      Temp      Pulse     Resp      SpO2

## 2021-06-14 NOTE — ASSESSMENT & PLAN NOTE
Lab Results   Component Value Date    HGBA1C 11 0 (H) 06/11/2021       Recent Labs     06/13/21  1709 06/13/21  2312 06/14/21  0609 06/14/21  1055   POCGLU 151* 213* 128 132       Blood Sugar Average: Last 72 hrs:  (P) 152 [id number] glucose 301  Patient is on metformin 500 mg p o  B i d  At home    Hold metformin  Start Lantus 8 units subcu HS  SSI  Repeat A1c

## 2021-06-14 NOTE — DISCHARGE SUMMARY
Leslye U  65   Discharge- Aman Rocha 1970, 45 y o  male MRN: [id number]  Unit/Bed#: -01 Encounter: [id number]  Primary Care Provider: No primary care provider on file  Date and time admitted to hospital: 6/10/2021  9:50 AM    * Small bowel obstruction [facility])  Assessment & Plan  Patient presents with abdominal pain nausea vomiting diarrhea for 3-4 days  CT abdomen pelvis with contrast showed - Mild proximal small bowel dilatation with questionable transition within the left midabdomen some degree of underlying low-grade obstruction not entirely excluded  History of bowel obstruction with lysis of adhesion about 4 years ago  Patient seen by surgery in ED, appreciate input          06/11/2021  Patient states his pain is getting worse, complains of nausea, no vomiting  Patient states he had small bowel movement this morning, no further episodes of bowel movement  Patient states he is not passing gas  Patient is stating that current pain medication not helping him  Obtain x-ray of the abdomen, reached out to General surgery  Will follow-up recommendations continue NPO     06/11/2021 at 12:10 p m , received call from general surgeon Dr Carole Escoto, no need of any acute surgical intervention at this point, continue NPO and IV medication and fluids  Repeat x-ray was reviewed shows no obstruction  Lipase was repeated which was normal   Plan was discussed with surgical team   Pain is in the epigastric region and likely gastritis versus peptic ulcer disease  GI input was obtained  If persists to have pain will be scheduled for EGD on Monday  Patient has been started on clear liquid diet and will monitor for now  6/13-X-ray was reviewed with the Dr Kael Osorio who thinks patient has bowel distension and possible obstruction  Patient is on clear liquids able to tolerate however has abdominal pain periumbilical region    Patient is scheduled for EGD on Monday which was negative findings on EGD patient will need to be transferred to [facility] for laparoscopic evaluation     egd- showed 1  Mild erythema of the gastric body noted along greater curvature this is from bile gastritis from obstruction and regurgitation of bilious material   2  Cause of abdominal pain is not explained by this procedure and is likely from adhesive disease  Nicotine abuse  Assessment & Plan  Smoking cessation, nicotine patch    History of CVA (cerebrovascular accident)  Assessment & Plan  History of CVA x 2 in his 35s  Likely secondary to paroxysmal AFib  With resultant mild right-sided weakness  Stop taking Pradaxa for months  Not on statin at home  Once patient is surgically cleared will consider starting on systemic anticoagulation Eliquis or Xarelto given his prior history of strokes  And then outpatient follow-up with Cardiology and PCP  Paroxysmal atrial fibrillation [facility])  Assessment & Plan  Patient stopped taking medications for months  Recently moved to the area in November last year, does not have a PCP or cardiologist in area  Reports feeling palpitation about 2 times a day  EKG today showed sinus tach, rate 105  Place patient on telemetry  Optimize electrolytes  Recommend referral to cardiology on discharge    Currently patient in sinus rhythm  Type 2 diabetes mellitus, without long-term current use of insulin [facility])  Assessment & Plan  Lab Results   Component Value Date    HGBA1C 11 0 (H) 06/11/2021       Recent Labs     06/13/21  1709 06/13/21  2312 06/14/21  0609 06/14/21  1055   POCGLU 151* 213* 128 132       Blood Sugar Average: Last 72 hrs:  (P) 152 [id number] glucose 301  Patient is on metformin 500 mg p o  B i d  At home  Hold metformin  Start Lantus 8 units subcu HS  SSI  Repeat A1c    Hypertension  Assessment & Plan  Continue losartan with holding parameter    BP stable              Medical Problems     Resolved Problems  Date Reviewed: 6/10/2021 None                Admission Date:   Admission Orders (From admission, onward)     Ordered        06/11/21 1210  Inpatient Admission  Once         06/10/21 1154  Place in Observation  Once                     Admitting Diagnosis: SBO (small bowel obstruction) ([facility]) [K56 609]  Abdominal pain [R10 9]  Generalized abdominal pain [R10 84]  Hyperglycemia due to diabetes mellitus ([facility] ) [E11 65]    HPI: Geoffrey Grace is a 45 y o  male with PMH of bowel obstruction, appendectomy, cholecystectomy, lysis of intestinal adhesions, paroxysmal atrial fibrillation, type 2 diabetes, hypertension, CVA, nicotine abuse, who presents with abdominal pain nausea vomiting diarrhea for 3- 4 days  Patient reports abdominal pain is diffuse, constant, mostly in upper abdomen, feeling bloated/heaviness in abdomen  Reports feeling sick every time after eating and then threw up  Vomited about 5-6 times in past 3-4 days,non bloody  Reports diarrhea 3-4 times per day at home, watery  Patient denies fever, chills, chest pain, headache, dizziness, cough, SOB at home  Reports history of bowel obstruction, underwent surgery about 4 years ago  Patient reports blood sugar being high at home,was 400 yesterday and urinating a lot  Patient reports stop taking medications for AFib for months            Procedures Performed: No orders of the defined types were placed in this encounter  Summary of Hospital Course:   Patient was admitted epigastric pain 8/10 which was constant intermittent cramping like pain associated with nausea and dry heaving  Patient had watery bowel movements almost every other day  Patient was admitted with small-bowel obstruction  Patient denied of any bleeding per rectum or melena or hematemesis    Patient has a history of small-bowel obstruction status post resection 4 years ago with history of cholecystectomy and appendectomy and paroxysmal AFib not on anticoagulation, diabetes mellitus type 2, CVA,, patient during the hospital Course was kept NPO and given IV hydration  Patient had no vomiting so no NG tube was placed  Surgical input was obtained  Patient had a lipase level within normal limits  Patient had a CT scan of the abdomen which showed mild proximal small bowel of dilatation with questionable transition within the left mid abdomen some degree of underlying low-grade obstruction  Patient however did not improve and has had a GI evaluation and had a EGD done today which showed unremarkable findings and recommended to transfer to [facility] for laparoscopic evaluation by surgeon Dr Devi Lewis  Is apparently patient also has  family history of daughter having cystic fibrosis who passed away a few years ago and also son was diagnosed with cystic fibrosis recently  Significant Findings, Care, Treatment and Services Provided: ct abdomen , egd     Complications: nil    Condition at Discharge: good         Discharge instructions/Information to patient and family:   See after visit summary for information provided to patient and family  Provisions for Follow-Up Care:  See after visit summary for information related to follow-up care and any pertinent home health orders  PCP: No primary care provider on file  Disposition: See After Visit Summary for discharge disposition information  Planned Readmission: No    Discharge Statement   I spent 45 minutes discharging the patient  This time was spent on the day of discharge  I had direct contact with the patient on the day of discharge  Additional documentation is required if more than 30 minutes were spent on discharge  Discharge Medications:  See after visit summary for reconciled discharge medications provided to patient and family

## 2021-06-14 NOTE — ANESTHESIA PREPROCEDURE EVALUATION
Procedure:  EGD    Relevant Problems   CARDIO   (+) Hypertension   (+) Paroxysmal atrial fibrillation (HCC)      ENDO   (+) Type 2 diabetes mellitus, without long-term current use of insulin (HCC)      GI/HEPATIC   (+) Small bowel obstruction (HCC)      NEURO/PSYCH   (+) History of CVA (cerebrovascular accident)        Physical Exam    Airway    Mallampati score: II  TM Distance: >3 FB  Neck ROM: full     Dental       Cardiovascular  Rhythm: regular,     Pulmonary  Breath sounds clear to auscultation,     Other Findings        Anesthesia Plan  ASA Score- 2     Anesthesia Type- general with ASA Monitors  Additional Monitors:   Airway Plan:           Plan Factors-    Chart reviewed  Existing labs reviewed  Induction- intravenous  Postoperative Plan-     Informed Consent-   I personally reviewed this patient with the CRNA  Discussed and agreed on the Anesthesia Plan with the CRNA  Richard Estrada

## 2021-06-14 NOTE — ASSESSMENT & PLAN NOTE
Patient presents with abdominal pain nausea vomiting diarrhea for 3-4 days  CT abdomen pelvis with contrast showed - Mild proximal small bowel dilatation with questionable transition within the left midabdomen some degree of underlying low-grade obstruction not entirely excluded  History of bowel obstruction with lysis of adhesion about 4 years ago  Patient seen by surgery in ED, appreciate input          06/11/2021  Patient states his pain is getting worse, complains of nausea, no vomiting  Patient states he had small bowel movement this morning, no further episodes of bowel movement  Patient states he is not passing gas  Patient is stating that current pain medication not helping him  Obtain x-ray of the abdomen, reached out to General surgery  Will follow-up recommendations continue NPO     06/11/2021 at 12:10 p m , received call from general surgeon Dr Keyur Mai, no need of any acute surgical intervention at this point, continue NPO and IV medication and fluids  Repeat x-ray was reviewed shows no obstruction  Lipase was repeated which was normal   Plan was discussed with surgical team   Pain is in the epigastric region and likely gastritis versus peptic ulcer disease  GI input was obtained  If persists to have pain will be scheduled for EGD on Monday  Patient has been started on clear liquid diet and will monitor for now  6/13-X-ray was reviewed with the Dr Dolores Vasquez who thinks patient has bowel distension and possible obstruction  Patient is on clear liquids able to tolerate however has abdominal pain periumbilical region  Patient is scheduled for EGD on Monday which was negative  findings on EGD patient will need to be [facility] for laparoscopic evaluation     egd- showed 1   Mild erythema of the gastric body noted along greater curvature this is from bile gastritis from obstruction and regurgitation of bilious material   2  Cause of abdominal pain is not explained by this procedure and is likely from adhesive disease

## 2021-06-14 NOTE — INTERVAL H&P NOTE
H&P reviewed  After examining the patient I find no changes in the patients condition since the H&P had been written      Vitals:    06/14/21 1136   BP: 148/88   Pulse: 74   Resp: 13   Temp: 98 1 °F (36 7 °C)   SpO2: 96%

## 2021-06-15 ENCOUNTER — ANESTHESIA (INPATIENT)
Dept: PERIOP | Facility: HOSPITAL | Age: 51
DRG: 337 | End: 2021-06-15
Payer: COMMERCIAL

## 2021-06-15 LAB
ANION GAP SERPL CALCULATED.3IONS-SCNC: 6 MMOL/L (ref 4–13)
BASOPHILS # BLD AUTO: 0.03 THOUSANDS/ΜL (ref 0–0.1)
BASOPHILS NFR BLD AUTO: 1 % (ref 0–1)
BUN SERPL-MCNC: 6 MG/DL (ref 5–25)
CALCIUM SERPL-MCNC: 8.3 MG/DL (ref 8.3–10.1)
CHLORIDE SERPL-SCNC: 108 MMOL/L (ref 100–108)
CO2 SERPL-SCNC: 27 MMOL/L (ref 21–32)
CREAT SERPL-MCNC: 0.47 MG/DL (ref 0.6–1.3)
EOSINOPHIL # BLD AUTO: 0.17 THOUSAND/ΜL (ref 0–0.61)
EOSINOPHIL NFR BLD AUTO: 3 % (ref 0–6)
ERYTHROCYTE [DISTWIDTH] IN BLOOD BY AUTOMATED COUNT: 12.2 % (ref 11.6–15.1)
GFR SERPL CREATININE-BSD FRML MDRD: 128 ML/MIN/1.73SQ M
GLUCOSE SERPL-MCNC: 119 MG/DL (ref 65–140)
GLUCOSE SERPL-MCNC: 120 MG/DL (ref 65–140)
GLUCOSE SERPL-MCNC: 147 MG/DL (ref 65–140)
GLUCOSE SERPL-MCNC: 203 MG/DL (ref 65–140)
HCT VFR BLD AUTO: 44 % (ref 36.5–49.3)
HGB BLD-MCNC: 14.9 G/DL (ref 12–17)
IMM GRANULOCYTES # BLD AUTO: 0.01 THOUSAND/UL (ref 0–0.2)
IMM GRANULOCYTES NFR BLD AUTO: 0 % (ref 0–2)
LYMPHOCYTES # BLD AUTO: 1.53 THOUSANDS/ΜL (ref 0.6–4.47)
LYMPHOCYTES NFR BLD AUTO: 28 % (ref 14–44)
MCH RBC QN AUTO: 30.6 PG (ref 26.8–34.3)
MCHC RBC AUTO-ENTMCNC: 33.9 G/DL (ref 31.4–37.4)
MCV RBC AUTO: 90 FL (ref 82–98)
MONOCYTES # BLD AUTO: 0.57 THOUSAND/ΜL (ref 0.17–1.22)
MONOCYTES NFR BLD AUTO: 10 % (ref 4–12)
NEUTROPHILS # BLD AUTO: 3.16 THOUSANDS/ΜL (ref 1.85–7.62)
NEUTS SEG NFR BLD AUTO: 58 % (ref 43–75)
NRBC BLD AUTO-RTO: 0 /100 WBCS
PLATELET # BLD AUTO: 155 THOUSANDS/UL (ref 149–390)
PMV BLD AUTO: 9.9 FL (ref 8.9–12.7)
POTASSIUM SERPL-SCNC: 3.6 MMOL/L (ref 3.5–5.3)
RBC # BLD AUTO: 4.87 MILLION/UL (ref 3.88–5.62)
SODIUM SERPL-SCNC: 141 MMOL/L (ref 136–145)
WBC # BLD AUTO: 5.47 THOUSAND/UL (ref 4.31–10.16)

## 2021-06-15 PROCEDURE — 82948 REAGENT STRIP/BLOOD GLUCOSE: CPT

## 2021-06-15 PROCEDURE — 85025 COMPLETE CBC W/AUTO DIFF WBC: CPT | Performed by: STUDENT IN AN ORGANIZED HEALTH CARE EDUCATION/TRAINING PROGRAM

## 2021-06-15 PROCEDURE — 80048 BASIC METABOLIC PNL TOTAL CA: CPT | Performed by: STUDENT IN AN ORGANIZED HEALTH CARE EDUCATION/TRAINING PROGRAM

## 2021-06-15 PROCEDURE — 3E0M45Z INTRODUCTION OF ADHESION BARRIER INTO PERITONEAL CAVITY, PERCUTANEOUS ENDOSCOPIC APPROACH: ICD-10-PCS | Performed by: SURGERY

## 2021-06-15 PROCEDURE — 0DN84ZZ RELEASE SMALL INTESTINE, PERCUTANEOUS ENDOSCOPIC APPROACH: ICD-10-PCS | Performed by: SURGERY

## 2021-06-15 PROCEDURE — C1765 ADHESION BARRIER: HCPCS | Performed by: SURGERY

## 2021-06-15 RX ORDER — HYDROMORPHONE HCL/PF 1 MG/ML
0.4 SYRINGE (ML) INJECTION
Status: DISCONTINUED | OUTPATIENT
Start: 2021-06-15 | End: 2021-06-15 | Stop reason: HOSPADM

## 2021-06-15 RX ORDER — FENTANYL CITRATE 50 UG/ML
INJECTION, SOLUTION INTRAMUSCULAR; INTRAVENOUS AS NEEDED
Status: DISCONTINUED | OUTPATIENT
Start: 2021-06-15 | End: 2021-06-15

## 2021-06-15 RX ORDER — ONDANSETRON 2 MG/ML
INJECTION INTRAMUSCULAR; INTRAVENOUS AS NEEDED
Status: DISCONTINUED | OUTPATIENT
Start: 2021-06-15 | End: 2021-06-15

## 2021-06-15 RX ORDER — OXYCODONE HYDROCHLORIDE 10 MG/1
10 TABLET ORAL EVERY 4 HOURS PRN
Status: DISCONTINUED | OUTPATIENT
Start: 2021-06-15 | End: 2021-06-17 | Stop reason: HOSPADM

## 2021-06-15 RX ORDER — ROCURONIUM BROMIDE 10 MG/ML
INJECTION, SOLUTION INTRAVENOUS AS NEEDED
Status: DISCONTINUED | OUTPATIENT
Start: 2021-06-15 | End: 2021-06-15

## 2021-06-15 RX ORDER — HYDROMORPHONE HCL/PF 1 MG/ML
0.5 SYRINGE (ML) INJECTION
Status: DISCONTINUED | OUTPATIENT
Start: 2021-06-15 | End: 2021-06-16

## 2021-06-15 RX ORDER — ONDANSETRON 2 MG/ML
4 INJECTION INTRAMUSCULAR; INTRAVENOUS ONCE AS NEEDED
Status: DISCONTINUED | OUTPATIENT
Start: 2021-06-15 | End: 2021-06-15 | Stop reason: HOSPADM

## 2021-06-15 RX ORDER — POTASSIUM CHLORIDE 20 MEQ/1
40 TABLET, EXTENDED RELEASE ORAL ONCE
Status: COMPLETED | OUTPATIENT
Start: 2021-06-15 | End: 2021-06-15

## 2021-06-15 RX ORDER — BUPIVACAINE HYDROCHLORIDE 2.5 MG/ML
INJECTION, SOLUTION EPIDURAL; INFILTRATION; INTRACAUDAL AS NEEDED
Status: DISCONTINUED | OUTPATIENT
Start: 2021-06-15 | End: 2021-06-15 | Stop reason: HOSPADM

## 2021-06-15 RX ORDER — MIDAZOLAM HYDROCHLORIDE 2 MG/2ML
INJECTION, SOLUTION INTRAMUSCULAR; INTRAVENOUS AS NEEDED
Status: DISCONTINUED | OUTPATIENT
Start: 2021-06-15 | End: 2021-06-15

## 2021-06-15 RX ORDER — LIDOCAINE HYDROCHLORIDE 10 MG/ML
INJECTION, SOLUTION EPIDURAL; INFILTRATION; INTRACAUDAL; PERINEURAL AS NEEDED
Status: DISCONTINUED | OUTPATIENT
Start: 2021-06-15 | End: 2021-06-15

## 2021-06-15 RX ORDER — DEXAMETHASONE SODIUM PHOSPHATE 10 MG/ML
INJECTION, SOLUTION INTRAMUSCULAR; INTRAVENOUS AS NEEDED
Status: DISCONTINUED | OUTPATIENT
Start: 2021-06-15 | End: 2021-06-15

## 2021-06-15 RX ORDER — ALBUMIN, HUMAN INJ 5% 5 %
SOLUTION INTRAVENOUS CONTINUOUS PRN
Status: DISCONTINUED | OUTPATIENT
Start: 2021-06-15 | End: 2021-06-15

## 2021-06-15 RX ORDER — SODIUM CHLORIDE, SODIUM LACTATE, POTASSIUM CHLORIDE, CALCIUM CHLORIDE 600; 310; 30; 20 MG/100ML; MG/100ML; MG/100ML; MG/100ML
INJECTION, SOLUTION INTRAVENOUS CONTINUOUS PRN
Status: DISCONTINUED | OUTPATIENT
Start: 2021-06-15 | End: 2021-06-15

## 2021-06-15 RX ORDER — FENTANYL CITRATE/PF 50 MCG/ML
25 SYRINGE (ML) INJECTION
Status: DISCONTINUED | OUTPATIENT
Start: 2021-06-15 | End: 2021-06-15 | Stop reason: HOSPADM

## 2021-06-15 RX ORDER — SUCCINYLCHOLINE/SOD CL,ISO/PF 100 MG/5ML
SYRINGE (ML) INTRAVENOUS AS NEEDED
Status: DISCONTINUED | OUTPATIENT
Start: 2021-06-15 | End: 2021-06-15

## 2021-06-15 RX ORDER — MEPERIDINE HYDROCHLORIDE 25 MG/ML
12.5 INJECTION INTRAMUSCULAR; INTRAVENOUS; SUBCUTANEOUS
Status: DISCONTINUED | OUTPATIENT
Start: 2021-06-15 | End: 2021-06-15 | Stop reason: HOSPADM

## 2021-06-15 RX ORDER — SODIUM CHLORIDE 9 MG/ML
INJECTION, SOLUTION INTRAVENOUS CONTINUOUS PRN
Status: DISCONTINUED | OUTPATIENT
Start: 2021-06-15 | End: 2021-06-15

## 2021-06-15 RX ORDER — CEFAZOLIN SODIUM 1 G/3ML
INJECTION, POWDER, FOR SOLUTION INTRAMUSCULAR; INTRAVENOUS AS NEEDED
Status: DISCONTINUED | OUTPATIENT
Start: 2021-06-15 | End: 2021-06-15

## 2021-06-15 RX ORDER — MAGNESIUM HYDROXIDE 1200 MG/15ML
LIQUID ORAL AS NEEDED
Status: DISCONTINUED | OUTPATIENT
Start: 2021-06-15 | End: 2021-06-15 | Stop reason: HOSPADM

## 2021-06-15 RX ORDER — ALBUTEROL SULFATE 2.5 MG/3ML
2.5 SOLUTION RESPIRATORY (INHALATION) ONCE AS NEEDED
Status: DISCONTINUED | OUTPATIENT
Start: 2021-06-15 | End: 2021-06-15 | Stop reason: HOSPADM

## 2021-06-15 RX ORDER — OXYCODONE HYDROCHLORIDE 5 MG/1
5 TABLET ORAL EVERY 4 HOURS PRN
Status: DISCONTINUED | OUTPATIENT
Start: 2021-06-15 | End: 2021-06-17 | Stop reason: HOSPADM

## 2021-06-15 RX ADMIN — Medication 25 MCG: at 11:15

## 2021-06-15 RX ADMIN — OXYCODONE HYDROCHLORIDE 5 MG: 5 TABLET ORAL at 17:43

## 2021-06-15 RX ADMIN — FENTANYL CITRATE 100 MCG: 50 INJECTION INTRAMUSCULAR; INTRAVENOUS at 08:18

## 2021-06-15 RX ADMIN — SODIUM CHLORIDE 125 ML/HR: 0.9 INJECTION, SOLUTION INTRAVENOUS at 10:58

## 2021-06-15 RX ADMIN — CEFAZOLIN 2000 MG: 1 INJECTION, POWDER, FOR SOLUTION INTRAMUSCULAR; INTRAVENOUS at 08:29

## 2021-06-15 RX ADMIN — MIDAZOLAM 2 MG: 1 INJECTION INTRAMUSCULAR; INTRAVENOUS at 08:08

## 2021-06-15 RX ADMIN — METRONIDAZOLE 500 MG: 500 INJECTION, SOLUTION INTRAVENOUS at 08:29

## 2021-06-15 RX ADMIN — POTASSIUM CHLORIDE 40 MEQ: 1500 TABLET, EXTENDED RELEASE ORAL at 17:37

## 2021-06-15 RX ADMIN — HYDROMORPHONE HYDROCHLORIDE 0.5 MG: 1 INJECTION, SOLUTION INTRAMUSCULAR; INTRAVENOUS; SUBCUTANEOUS at 13:34

## 2021-06-15 RX ADMIN — HYDROMORPHONE HYDROCHLORIDE 0.5 MG: 1 INJECTION, SOLUTION INTRAMUSCULAR; INTRAVENOUS; SUBCUTANEOUS at 21:52

## 2021-06-15 RX ADMIN — ALBUMIN (HUMAN): 12.5 INJECTION, SOLUTION INTRAVENOUS at 09:46

## 2021-06-15 RX ADMIN — INSULIN LISPRO 4 UNITS: 100 INJECTION, SOLUTION INTRAVENOUS; SUBCUTANEOUS at 17:38

## 2021-06-15 RX ADMIN — HYDROMORPHONE HYDROCHLORIDE 0.5 MG: 1 INJECTION, SOLUTION INTRAMUSCULAR; INTRAVENOUS; SUBCUTANEOUS at 04:20

## 2021-06-15 RX ADMIN — Medication 25 MCG: at 10:55

## 2021-06-15 RX ADMIN — Medication 100 MG: at 08:18

## 2021-06-15 RX ADMIN — DEXAMETHASONE SODIUM PHOSPHATE 10 MG: 10 INJECTION, SOLUTION INTRAMUSCULAR; INTRAVENOUS at 08:43

## 2021-06-15 RX ADMIN — HEPARIN SODIUM 5000 UNITS: 5000 INJECTION INTRAVENOUS; SUBCUTANEOUS at 13:32

## 2021-06-15 RX ADMIN — SUGAMMADEX 200 MG: 100 INJECTION, SOLUTION INTRAVENOUS at 10:06

## 2021-06-15 RX ADMIN — Medication 25 MCG: at 10:44

## 2021-06-15 RX ADMIN — SODIUM CHLORIDE, SODIUM LACTATE, POTASSIUM CHLORIDE, AND CALCIUM CHLORIDE: .6; .31; .03; .02 INJECTION, SOLUTION INTRAVENOUS at 08:08

## 2021-06-15 RX ADMIN — HEPARIN SODIUM 5000 UNITS: 5000 INJECTION INTRAVENOUS; SUBCUTANEOUS at 05:53

## 2021-06-15 RX ADMIN — OXYCODONE HYDROCHLORIDE 5 MG: 5 TABLET ORAL at 12:06

## 2021-06-15 RX ADMIN — SODIUM CHLORIDE 125 ML/HR: 0.9 INJECTION, SOLUTION INTRAVENOUS at 22:03

## 2021-06-15 RX ADMIN — INSULIN HUMAN 6 UNITS: 100 INJECTION, SOLUTION PARENTERAL at 10:40

## 2021-06-15 RX ADMIN — ONDANSETRON 4 MG: 2 INJECTION INTRAMUSCULAR; INTRAVENOUS at 08:43

## 2021-06-15 RX ADMIN — ROCURONIUM BROMIDE 30 MG: 50 INJECTION, SOLUTION INTRAVENOUS at 08:30

## 2021-06-15 RX ADMIN — INSULIN LISPRO 2 UNITS: 100 INJECTION, SOLUTION INTRAVENOUS; SUBCUTANEOUS at 12:09

## 2021-06-15 RX ADMIN — SODIUM CHLORIDE: 0.9 INJECTION, SOLUTION INTRAVENOUS at 08:23

## 2021-06-15 RX ADMIN — Medication 1 PATCH: at 12:05

## 2021-06-15 RX ADMIN — LIDOCAINE HYDROCHLORIDE 50 MG: 10 INJECTION, SOLUTION EPIDURAL; INFILTRATION; INTRACAUDAL; PERINEURAL at 08:18

## 2021-06-15 RX ADMIN — HEPARIN SODIUM 5000 UNITS: 5000 INJECTION INTRAVENOUS; SUBCUTANEOUS at 21:52

## 2021-06-15 RX ADMIN — HYDROMORPHONE HYDROCHLORIDE 0.5 MG: 1 INJECTION, SOLUTION INTRAMUSCULAR; INTRAVENOUS; SUBCUTANEOUS at 23:56

## 2021-06-15 RX ADMIN — SODIUM CHLORIDE: 0.9 INJECTION, SOLUTION INTRAVENOUS at 09:46

## 2021-06-15 NOTE — UTILIZATION REVIEW
Initial Clinical Review    6/10 Transfer from [address] [place] [address],[address]  Pt at [address] [place] [address],[address] from 6/10 to 6/14    Admission: Date/Time/Statement:   Admission Orders (From admission, onward)     Ordered        06/14/21 2124  Inpatient Admission  Once                   Orders Placed This Encounter   Procedures    Inpatient Admission     Standing Status:   Standing     Number of Occurrences:   1     Order Specific Question:   Level of Care     Answer:   Med Surg [16]     Order Specific Question:   Estimated length of stay     Answer:   More than 2 Midnights     Order Specific Question:   Certification     Answer:   I certify that inpatient services are medically necessary for this patient for a duration of greater than two midnights  See H&P and MD Progress Notes for additional information about the patient's course of treatment  ED Arrival Information     Patient not seen in ED                     Initial Presentation:   46y Male, transfer from [address] [place] [address] surg unit to [facility] initially presented with abdominal pain and nausea  Last BM was watery yesterday  Not passing flatus  Per pt symptoms felt similar to previous bowel obstruction where he needed Ex Lap JONA approx  5 yrs ago  S/p EGD today which was unremarkable  Obstruction series revealed a persistent loop of small bowel in the LUQ  EGD was unrevealing for gastic etiology, however, biliary backwash suggested a high SBO  PMH for CVA and paroxysmal A fib not on anticoagulation  Admit Inpatient level of care for SBO psb 2/2 adhesive disease  Plan OR for tomorrow 6/15; Diagnostic laparoscopy with JONA, psb ex lap  NPO/ IVFs  Analgesic/ antiemetics prn  On exam;     Date: 6/15  Day 2:   Progress notes; Plan for OR to[place]  NPO/ IVFs  Analgesia prn  Some nausea yesterday but no emesis   Denies flatus or BM      6/15 OR - S/p Diagnostic laparoscopy, lysis of adhesions (N/A)  Operative Findings:  Extensive adhesion of multiple loops of small bowel to anterior abdominal wall    ED Triage Vitals   Temperature Pulse Respirations Blood Pressure SpO2   06/14/21 2021 06/14/21 2021 06/15/21 1019 06/14/21 2021 06/14/21 2021   98 6 °F (37 °C) 76 17 158/94 98 %      Temp Source Heart Rate Source Patient Position - Orthostatic VS BP Location FiO2 (%)   06/15/21 1517 -- 06/15/21 1517 06/15/21 1517 --   Oral  Sitting Left arm       Pain Score       06/14/21 2050       7          Wt Readings from Last 1 Encounters:   06/14/21 79 4 kg (175 lb)     Additional Vital Signs:   06/15/21 1045  --  80  15  113/59  --  96 %  28  --  2 L/min  Nasal cannula  --  --   06/15/21 1030  --  88  15  114/56  --  97 %  --  3 L/min  --  Nasal cannula  --  --   06/15/21 1019  97 4 °F (36 3 °C)Abnormal   94  17  133/85  --  98 %  --  6 L/min  --  Simple mask  X  --   06/15/21 0746  --  --  --  --  --  --  --  --  --  None (Room air)  --  --   06/14/21 2100  --  --  --  --  --  --  --  --  --  None (Room air)         Pertinent Labs/Diagnostic Test Results:   6/14 S/p EGD;  IMPRESSION:  1  Mild erythema of the gastric body noted along greater curvature this is from bile gastritis from obstruction and regurgitation of bilious material   2  Cause of abdominal pain is not explained by this procedure and is likely from adhesive disease        RECOMMENDATION:  1  Check clinical course    2  Surgical follow-up is recommended          Lab Units 06/15/21  0552 06/14/21  2243 06/14/21  0604   WBC Thousand/uL 5 47  --  4 65   HEMOGLOBIN g/dL 14 9  --  14 7   HEMATOCRIT % 44 0  --  42 0   PLATELETS Thousands/uL 155 172 156   NEUTROS ABS Thousands/µL 3 16  --   --          Lab Units 06/15/21  0552 06/14/21  0604   SODIUM mmol/L 141 139   POTASSIUM mmol/L 3 6 3 5   CHLORIDE mmol/L 108 105   CO2 mmol/L 27 30   ANION GAP mmol/L 6 4   BUN mg/dL 6 6   CREATININE mg/dL 0 47* 0 59   EGFR ml/min/1 73sq m 128 116   CALCIUM mg/dL 8 3 8 3*       Lab Units 06/15/21  1203 06/15/21  1026 06/15/21  0544 06/15/21  0052 06/14/21  1055 06/14/21  0609   POC GLUCOSE mg/dl 199* 203* 120 147* 132 128     Lab Units 06/15/21  0552 06/14/21  0604   GLUCOSE RANDOM mg/dL 119 127        Results from last 7 days   Lab Units 06/10/21  1002   TROPONIN I ng/mL <0 03       Results from last 7 days   Lab Units 06/12/21  0549 06/10/21  1002   LACTIC ACID mmol/L 0 5 1 7     Results from last 7 days   Lab Units 06/12/21  0549 06/10/21  1002   LIPASE u/L 9* 52             Results from last 7 days   Lab Units 06/10/21  1004   CLARITY UA  Clear   COLOR UA  Yellow   SPEC GRAV UA  >=1 030   PH UA  5 5   GLUCOSE UA mg/dl 3+*   KETONES UA mg/dl Negative   BLOOD UA  Trace-Intact*   PROTEIN UA mg/dl 1+*   NITRITE UA  Negative   BILIRUBIN UA  Negative   UROBILINOGEN UA E U /dl 0 2   LEUKOCYTES UA  Negative   WBC UA /hpf None Seen   RBC UA /hpf 1-2   BACTERIA UA /hpf None Seen   EPITHELIAL CELLS WET PREP /hpf None Seen       Past Medical History:   Diagnosis Date    A-fib ([place] [address] )     Diabetes mellitus ([place] [address] )     Hypertension     Stroke ([place] [address] )     x 2 in mid 30's     Present on Admission:   Small bowel obstruction (HCC)      Admitting Diagnosis: Small bowel obstruction ([place] [address] ) [K56 [id number]]  Age/Sex: 45 y o  male  Admission Orders:  Scheduled Medications:  heparin (porcine), 5,000 Units, Subcutaneous, Q8H [facility]  insulin lispro, 1-6 Units, Subcutaneous, Q6H [facility]  nicotine, 1 patch, Transdermal, Daily  potassium chloride, 40 mEq, Oral, Once      Continuous IV Infusions:  sodium chloride, 125 mL/hr, Intravenous, Continuous      PRN Meds:  hydrALAZINE, 5 mg, Intravenous, Q6H PRN  HYDROmorphone, 0 5 mg, Intravenous, Q1H PRN 6/14 x1, 6/15 x2  ondansetron, 4 mg, Intravenous, Q6H PRN  oxyCODONE, 10 mg, Oral, Q4H PRN  oxyCODONE, 5 mg, Oral, Q4H PRN 6/15 x1        6/15 Clear liquid diet    Network Utilization Review Department  ATTENTION: Please call with any questions or concerns to [phone] and carefully listen to the prompts so that you are directed to the right person   All voicemails are confidential   [facility] all requests for admission clinical reviews, approved or denied determinations and any other requests to dedicated fax number below belonging to the campus where the patient is receiving treatment   List of dedicated fax numbers for the Facilities:  [address] DENIALS (Administrative/Medical Necessity) [phone]   [address] (Maternity/NICU/Pediatrics) [phone]   [address] [place] [address] [facility] [place] [phone] [place] [address] [address] [facility] [place] [address] [phone]

## 2021-06-15 NOTE — PLAN OF CARE
Problem: PAIN - ADULT  Goal: Verbalizes/displays adequate comfort level or baseline comfort level  Description: Interventions:  - Encourage patient to monitor pain and request assistance  - Assess pain using appropriate pain scale  - Administer analgesics based on type and severity of pain and evaluate response  - Implement non-pharmacological measures as appropriate and evaluate response  - Consider cultural and social influences on pain and pain management  - Notify physician/advanced practitioner if interventions unsuccessful or patient reports new pain  Outcome: Progressing     Problem: INFECTION - ADULT  Goal: Absence or prevention of progression during hospitalization  Description: INTERVENTIONS:  - Assess and monitor for signs and symptoms of infection  - Monitor lab/diagnostic results  - Monitor all insertion sites, i e  indwelling lines, tubes, and drains  - Monitor endotracheal if appropriate and nasal secretions for changes in amount and color  - [place] appropriate cooling/warming therapies per order  - Administer medications as ordered  - Instruct and encourage patient and family to use good hand hygiene technique  - Identify and instruct in appropriate isolation precautions for identified infection/condition  Outcome: Progressing  Goal: Absence of fever/infection during neutropenic period  Description: INTERVENTIONS:  - Monitor WBC    Outcome: Progressing     Problem: SAFETY ADULT  Goal: Patient will remain free of falls  Description: INTERVENTIONS:  - Educate patient/family on patient safety including physical limitations  - Instruct patient to call for assistance with activity   - Consult OT/PT to assist with strengthening/mobility   - Keep Call bell within reach  - Keep bed low and locked with side rails adjusted as appropriate  - Keep care items and personal belongings within reach  - Initiate and maintain comfort rounds  - Make Fall Risk Sign visible to staff  - Offer Toileting every 2 Hours, in advance of need  - Initiate/Maintain bed alarm  - Obtain necessary fall risk management equipment: walker  - Apply yellow socks and bracelet for high fall risk patients  - Consider moving patient to room near nurses station  Outcome: Progressing  Goal: Maintain or return to baseline ADL function  Description: INTERVENTIONS:  -  Assess patient's ability to carry out ADLs; assess patient's baseline for ADL function and identify physical deficits which impact ability to perform ADLs (bathing, care of mouth/teeth, toileting, grooming, dressing, etc )  - Assess/evaluate cause of self-care deficits   - Assess range of motion  - Assess patient's mobility; develop plan if impaired  - Assess patient's need for assistive devices and provide as appropriate  - Encourage maximum independence but intervene and supervise when necessary  - Involve family in performance of ADLs  - Assess for home care needs following discharge   - Consider OT consult to assist with ADL evaluation and planning for discharge  - Provide patient education as appropriate  Outcome: Progressing  Goal: Maintains/Returns to pre admission functional level  Description: INTERVENTIONS:  - Perform BMAT or MOVE assessment daily    - Set and communicate daily mobility goal to care team and patient/family/caregiver  - Collaborate with rehabilitation services on mobility goals if consulted  - Perform Range of Motion 3 times a day  - Reposition patient every 2 hours    - Dangle patient 3 times a day  - Stand patient 3 times a day  - Ambulate patient 3 times a day  - Out of bed to chair 3 times a day   - Out of bed for meals 3  times a day  - Out of bed for toileting  - Record patient progress and toleration of activity level   Outcome: Progressing     Problem: DISCHARGE PLANNING  Goal: Discharge to home or other facility with appropriate resources  Description: INTERVENTIONS:  - Identify barriers to discharge w/patient and caregiver  - Arrange for needed discharge resources and transportation as appropriate  - Identify discharge learning needs (meds, wound care, etc )  - Arrange for interpretive services to assist at discharge as needed  - Refer to Case Management Department for coordinating discharge planning if the patient needs post-hospital services based on physician/advanced practitioner order or complex needs related to functional status, cognitive ability, or social support system  Outcome: Progressing     Problem: Knowledge Deficit  Goal: Patient/family/caregiver demonstrates understanding of disease process, treatment plan, medications, and discharge instructions  Description: Complete learning assessment and assess knowledge base    Interventions:  - Provide teaching at level of understanding  - Provide teaching via preferred learning methods  Outcome: Progressing

## 2021-06-15 NOTE — PROGRESS NOTES
Progress Note - Colorectal Surgery   Aman Rocha 45 y o  male MRN: [id number]  Unit/Bed#: -01 Encounter: [id number]    Assessment:  51M w psb SBO    Plan:  - OR today for laparoscopy psb ex lap JONA  - NPO  - IVF   - PRN analgesia  - OOB/ambulate  - SQH/SCDs      Subjective/Objective   Subjective: some nausea yesterday but no emesis  Denies flatus or BM    Objective:    Blood pressure 158/94, pulse 76, temperature 98 6 °F (37 °C), SpO2 98 %  ,There is no height or weight on file to calculate BMI  No intake/output data recorded      Invasive Devices     Peripheral Intravenous Line            Peripheral IV 06/12/21 Right;Ventral (anterior) Forearm 2 days                Physical Exam:   NAD, alert and oriented x3  Normocephalic, atraumatic  MMM, EOMI, PERRLA  Norm resp effort on RA  RRR  Abd soft, mild lower quadrant abdominal tenderness, ND  No calf tenderness or peripheral edema  Motor/sensation intact in distal extremities  CN grossly intact  -rash/lesions      Lab, Imaging and other studies:  Lab Results   Component Value Date    WBC 4 65 06/14/2021    HGB 14 7 06/14/2021    HCT 42 0 06/14/2021    MCV 87 06/14/2021     06/14/2021      Lab Results   Component Value Date    CALCIUM 8 3 (L) 06/14/2021    K 3 5 06/14/2021    CO2 30 06/14/2021     06/14/2021    BUN 6 06/14/2021    CREATININE 0 59 06/14/2021       VTE Pharmacologic Prophylaxis: Sequential compression device (Venodyne)   VTE Mechanical Prophylaxis: sequential compression device

## 2021-06-15 NOTE — ANESTHESIA PREPROCEDURE EVALUATION
Procedure:  Laparotomy, Lysis of adhesions, possible bowel resection (N/A Abdomen)  LAPAROSCOPY DIAGNOSTIC (N/A Abdomen)    Relevant Problems   CARDIO   (+) Hypertension   (+) Paroxysmal atrial fibrillation (HCC)      ENDO   (+) Type 2 diabetes mellitus, without long-term current use of insulin (HCC)      GI/HEPATIC   (+) Small bowel obstruction (HCC)      NEURO/PSYCH   (+) History of CVA (cerebrovascular accident)      CAD/PCI/MI/CHF -- denies, CVA approx 20 years ago related to A fib; not currently on [facility] [place]  COPD/ASTHMA/KEMAR -- denies  PROBS WITH PRIOR ANESTHESIA -- denies  NPO STATUS CONFIRMED    Lab Results   Component Value Date    WBC 5 47 06/15/2021    HGB 14 9 06/15/2021     06/15/2021     Lab Results   Component Value Date    SODIUM 139 06/14/2021    K 3 5 06/14/2021    BUN 6 06/14/2021    CREATININE 0 59 06/14/2021    EGFR 116 06/14/2021     No results found for: PTT   No results found for: INR    Blood type O    Lab Results   Component Value Date    HGBA1C 11 0 (H) 06/11/2021               Physical Exam    Airway    Mallampati score: II  TM Distance: >3 FB       Dental   No notable dental hx     Cardiovascular      Pulmonary      Other Findings        Anesthesia Plan  ASA Score- 2     Anesthesia Type- general with ASA Monitors  Additional Monitors:   Airway Plan: ETT  Comment:  HANG Rivera , have personally seen and evaluated the patient prior to anesthetic care  I have reviewed the pre-anesthetic record, and other medical records if appropriate to the anesthetic care  If a CRNA is involved in the case, I have reviewed the CRNA assessment, if present, and agree  Risks/benefits and alternatives discussed with patient including possible PONV, sore throat, and possibility of rare anesthetic and surgical emergencies          Plan Factors-Exercise tolerance (METS): >4 METS  Chart reviewed  EKG reviewed  Imaging results reviewed  Existing labs reviewed   Patient summary reviewed  Patient is not a current smoker  Patient not instructed to abstain from smoking on day of procedure  Patient did not smoke on day of surgery  Induction- intravenous and rapid sequence induction  Postoperative Plan- Plan for postoperative opioid use  Planned trial extubation    Informed Consent- Anesthetic plan and risks discussed with patient  I personally reviewed this patient with the CRNA  Discussed and agreed on the Anesthesia Plan with the CRNA  Citlalli Guallpa

## 2021-06-15 NOTE — PLAN OF CARE
Problem: PAIN - ADULT  Goal: Verbalizes/displays adequate comfort level or baseline comfort level  Description: Interventions:  - Encourage patient to monitor pain and request assistance  - Assess pain using appropriate pain scale  - Administer analgesics based on type and severity of pain and evaluate response  - Implement non-pharmacological measures as appropriate and evaluate response  - Consider cultural and social influences on pain and pain management  - Notify physician/advanced practitioner if interventions unsuccessful or patient reports new pain  Outcome: Progressing     Problem: INFECTION - ADULT  Goal: Absence or prevention of progression during hospitalization  Description: INTERVENTIONS:  - Assess and monitor for signs and symptoms of infection  - Monitor lab/diagnostic results  - Monitor all insertion sites, i e  indwelling lines, tubes, and drains  - Monitor endotracheal if appropriate and nasal secretions for changes in amount and color  - [place] appropriate cooling/warming therapies per order  - Administer medications as ordered  - Instruct and encourage patient and family to use good hand hygiene technique  - Identify and instruct in appropriate isolation precautions for identified infection/condition  Outcome: Progressing  Goal: Absence of fever/infection during neutropenic period  Description: INTERVENTIONS:  - Monitor WBC    Outcome: Progressing     Problem: SAFETY ADULT  Goal: Patient will remain free of falls  Description: INTERVENTIONS:  - Educate patient/family on patient safety including physical limitations  - Instruct patient to call for assistance with activity   - Consult OT/PT to assist with strengthening/mobility   - Keep Call bell within reach  - Keep bed low and locked with side rails adjusted as appropriate  - Keep care items and personal belongings within reach  - Initiate and maintain comfort rounds  - Make Fall Risk Sign visible to staff  - Offer Toileting every 2 Hours, in advance of need  - Apply yellow socks and bracelet for high fall risk patients  - Consider moving patient to room near nurses station  Outcome: Progressing  Goal: Maintain or return to baseline ADL function  Description: INTERVENTIONS:  -  Assess patient's ability to carry out ADLs; assess patient's baseline for ADL function and identify physical deficits which impact ability to perform ADLs (bathing, care of mouth/teeth, toileting, grooming, dressing, etc )  - Assess/evaluate cause of self-care deficits   - Assess range of motion  - Assess patient's mobility; develop plan if impaired  - Assess patient's need for assistive devices and provide as appropriate  - Encourage maximum independence but intervene and supervise when necessary  - Involve family in performance of ADLs  - Assess for home care needs following discharge   - Consider OT consult to assist with ADL evaluation and planning for discharge  - Provide patient education as appropriate  Outcome: Progressing    Problem: DISCHARGE PLANNING  Goal: Discharge to home or other facility with appropriate resources  Description: INTERVENTIONS:  - Identify barriers to discharge w/patient and caregiver  - Arrange for needed discharge resources and transportation as appropriate  - Identify discharge learning needs (meds, wound care, etc )  - Arrange for interpretive services to assist at discharge as needed  - Refer to Case Management Department for coordinating discharge planning if the patient needs post-hospital services based on physician/advanced practitioner order or complex needs related to functional status, cognitive ability, or social support system  Outcome: Progressing     Problem: Knowledge Deficit  Goal: Patient/family/caregiver demonstrates understanding of disease process, treatment plan, medications, and discharge instructions  Description: Complete learning assessment and assess knowledge base    Interventions:  - Provide teaching at level of understanding  - Provide teaching via preferred learning methods  Outcome: Progressing

## 2021-06-15 NOTE — ANESTHESIA POSTPROCEDURE EVALUATION
Post-Op Assessment Note    CV Status:  Stable    Pain management: adequate     Mental Status:  Awake   Hydration Status:  Stable   PONV Controlled:  Controlled   Airway Patency:  Patent      Post Op Vitals Reviewed: Yes      Staff: Anesthesiologist, CRNA         No complications documented      BP   133/85   Temp   98 1   Pulse  88   Resp   18   SpO2   100

## 2021-06-15 NOTE — H&P
This is a 58-year-old white male who presented to the [facility] this patient knows that this occurred on 07/03 days prior  gency room with nausea vomiting and abdominal pain  Concomitantly he has also had profuse watery diarrhea      This patient has had a previous cholecystectomy and appendectomy in the past   He has had 2 laparotomies for small bowel obstruction and lysis of adhe that occurs   His last laparotomy was 5 years ago      Upon admission is white blood cell count was 8 23 K, the hemoglobin was 18 2, hematocrit 50  9      CT scan was obtained and revealed findings consistent with a 1 mild small bowel obstruction with transition point in the left mid abdomen  After several days of bowel rest and hydration the pain persisted  After a trial of Clear liquids, the patient vomited  Obstruction series revealed a persistent loop of small bowel in the LUQ   EGD was unrevealing for gastic etiology, however, biliary backwash suggested a high SBO      Past medical history:  Diabetes mellitus, history of atrial fibrillation, status post a previous small bowel obstruction laparotomy 5 years ago, status post cholecystectomy, status post appendectomy     Allergies:  None     Medications:  Losartan 25 mg daily, metformin 500 mg daily      Physical exam:     Patient is awake alert orient x3     HEENT:  NINA EOMI, conjunctiva pink sclera clear     Neck:  Supple     Lungs:  Clear     Cor:  Regular rate and rhythm     :  Abdomen:  Mildly distended soft     Extremities:  Full range of motion     Neuro:  Grossly intact     Rectal:  Deferred     Impression: High small-bowel obstruction     Plan: Laparoscopic Lysis of Adhesions

## 2021-06-15 NOTE — PROGRESS NOTES
H&P Exam - General Surgery   Aman Rocha 45 y o  male MRN: [id number]  Unit/Bed#: -01 Encounter: [id number]    Assessment/Plan     Assessment:  51M w SBO psb 2/2 adhesive disease    Plan:  - OR tomorrow for diagnostic laparoscopy with JONA, psb ex lap  - NPO  - IVF hydration  - analgesia/anti-emetics  - type and screen tonight  - AM labs  - SQH/SCDs      History of Present Illness     HPI:  Neva Mitchell is a 45 y o  male who presents with abdominal pain and nausea  Patient was admitted at [facility] for the last several days for persistent abdominal pain  Last bowel movement was watery and was yesterday  He has not been passing flatus  He states symptoms feel similar to previous bowel obstruction, for which he required ex lap JONA approximately 5 years ago  He had an EGD today which was unremarkable  He does have a history of CVA and paroxysmal A fib but does not currently take any anticoagulation  Review of Systems   Constitutional: Negative for chills and fever  HENT: Negative for ear pain and sore throat  Eyes: Negative for pain and visual disturbance  Respiratory: Negative for cough and shortness of breath  Cardiovascular: Negative for chest pain and palpitations  Gastrointestinal: Positive for abdominal pain, diarrhea and nausea  Negative for abdominal distention, blood in stool and vomiting  Genitourinary: Negative for dysuria and hematuria  Musculoskeletal: Negative for arthralgias and back pain  Skin: Negative for color change and rash  Neurological: Negative for seizures and syncope  All other systems reviewed and are negative        Historical Information   Past Medical History:   Diagnosis Date    A-fib ([facility] )     Diabetes mellitus ([facility] )     Hypertension     Stroke ([facility] )     x 2 in mid 30's     Past Surgical History:   Procedure Laterality Date    ABDOMINAL SURGERY      APPENDECTOMY      CHOLECYSTECTOMY      LAPAROSCOPIC LYSIS INTESTINAL ADHESIONS       Social History   Social History     Substance and Sexual Activity   Alcohol Use Not Currently     Social History     Substance and Sexual Activity   Drug Use Never     Social History     Tobacco Use   Smoking Status Current Every Day Smoker    Packs/day: 0 50    Types: Cigarettes   Smokeless Tobacco Never Used     E-Cigarette/Vaping    E-Cigarette Use Never User      E-Cigarette/Vaping Substances     Family History: non-contributory    Meds/Allergies   all medications and allergies reviewed  No Known Allergies    Objective   First Vitals:   Blood Pressure: 158/94 (06/14/21 2021)  Pulse: 76 (06/14/21 2021)  Temperature: 98 6 °F (37 °C) (06/14/21 2021)  SpO2: 98 % (06/14/21 2021)    Current Vitals:   Blood Pressure: 158/94 (06/14/21 2021)  Pulse: 76 (06/14/21 2021)  Temperature: 98 6 °F (37 °C) (06/14/21 2021)  SpO2: 98 % (06/14/21 2021)    No intake or output data in the 24 hours ending 06/14/21 2258    Invasive Devices     Peripheral Intravenous Line            Peripheral IV 06/12/21 Right;Ventral (anterior) Forearm 1 day                Physical Exam  Vitals and nursing note reviewed  Constitutional:       Appearance: He is well-developed  HENT:      Head: Normocephalic and atraumatic  Eyes:      Conjunctiva/sclera: Conjunctivae normal    Cardiovascular:      Rate and Rhythm: Normal rate and regular rhythm  Heart sounds: No murmur heard  Pulmonary:      Effort: Pulmonary effort is normal  No respiratory distress  Breath sounds: Normal breath sounds  Abdominal:      General: There is distension (very mild)  Palpations: Abdomen is soft  Tenderness: There is abdominal tenderness (non focal in lower quadrants)  There is no guarding or rebound  Musculoskeletal:      Cervical back: Neck supple  Skin:     General: Skin is warm and dry  Neurological:      Mental Status: He is alert           Lab Results:   CBC:   Lab Results   Component Value Date    WBC 4 65 06/14/2021    HGB 14 7 06/14/2021    HCT 42 0 06/14/2021    MCV 87 06/14/2021     06/14/2021    MCH 30 5 06/14/2021    MCHC 35 0 06/14/2021    RDW 12 3 06/14/2021    MPV 10 0 06/14/2021   , CMP:   Lab Results   Component Value Date    SODIUM 139 06/14/2021    K 3 5 06/14/2021     06/14/2021    CO2 30 06/14/2021    BUN 6 06/14/2021    CREATININE 0 59 06/14/2021    CALCIUM 8 3 (L) 06/14/2021    EGFR 116 06/14/2021     Imaging: I have personally reviewed pertinent reports  EKG, Pathology, and Other Studies: I have personally reviewed pertinent reports  Code Status: Level 1 - Full Code  Advance Directive and Living Will:      Power of :    POLST:      Counseling / Coordination of Care  Total floor / unit time spent today 30 minutes  Greater than 50% of total time was spent with the patient and / or family counseling and / or coordination of care  A description of the counseling / coordination of care: discussion with patient and nursing staff on MS 7

## 2021-06-16 LAB
ANION GAP SERPL CALCULATED.3IONS-SCNC: 6 MMOL/L (ref 4–13)
ANION GAP SERPL CALCULATED.3IONS-SCNC: 7 MMOL/L (ref 4–13)
BUN SERPL-MCNC: 5 MG/DL (ref 5–25)
BUN SERPL-MCNC: 7 MG/DL (ref 5–25)
CALCIUM SERPL-MCNC: 5.8 MG/DL (ref 8.3–10.1)
CALCIUM SERPL-MCNC: 7.8 MG/DL (ref 8.3–10.1)
CHLORIDE SERPL-SCNC: 109 MMOL/L (ref 100–108)
CHLORIDE SERPL-SCNC: 120 MMOL/L (ref 100–108)
CO2 SERPL-SCNC: 20 MMOL/L (ref 21–32)
CO2 SERPL-SCNC: 26 MMOL/L (ref 21–32)
CREAT SERPL-MCNC: 0.21 MG/DL (ref 0.6–1.3)
CREAT SERPL-MCNC: 0.64 MG/DL (ref 0.6–1.3)
ERYTHROCYTE [DISTWIDTH] IN BLOOD BY AUTOMATED COUNT: 12.5 % (ref 11.6–15.1)
ERYTHROCYTE [DISTWIDTH] IN BLOOD BY AUTOMATED COUNT: 12.6 % (ref 11.6–15.1)
GFR SERPL CREATININE-BSD FRML MDRD: 113 ML/MIN/1.73SQ M
GFR SERPL CREATININE-BSD FRML MDRD: 178 ML/MIN/1.73SQ M
GLUCOSE SERPL-MCNC: 154 MG/DL (ref 65–140)
GLUCOSE SERPL-MCNC: 177 MG/DL (ref 65–140)
GLUCOSE SERPL-MCNC: 193 MG/DL (ref 65–140)
GLUCOSE SERPL-MCNC: 199 MG/DL (ref 65–140)
GLUCOSE SERPL-MCNC: 200 MG/DL (ref 65–140)
GLUCOSE SERPL-MCNC: 201 MG/DL (ref 65–140)
GLUCOSE SERPL-MCNC: 224 MG/DL (ref 65–140)
GLUCOSE SERPL-MCNC: 224 MG/DL (ref 65–140)
GLUCOSE SERPL-MCNC: 259 MG/DL (ref 65–140)
GLUCOSE SERPL-MCNC: 273 MG/DL (ref 65–140)
GLUCOSE SERPL-MCNC: 301 MG/DL (ref 65–140)
HCT VFR BLD AUTO: 41.8 % (ref 36.5–49.3)
HCT VFR BLD AUTO: 42 % (ref 36.5–49.3)
HGB BLD-MCNC: 13.9 G/DL (ref 12–17)
HGB BLD-MCNC: 14.3 G/DL (ref 12–17)
MAGNESIUM SERPL-MCNC: 1.3 MG/DL (ref 1.6–2.6)
MAGNESIUM SERPL-MCNC: 1.7 MG/DL (ref 1.6–2.6)
MCH RBC QN AUTO: 30.5 PG (ref 26.8–34.3)
MCH RBC QN AUTO: 30.9 PG (ref 26.8–34.3)
MCHC RBC AUTO-ENTMCNC: 33.1 G/DL (ref 31.4–37.4)
MCHC RBC AUTO-ENTMCNC: 34.2 G/DL (ref 31.4–37.4)
MCV RBC AUTO: 90 FL (ref 82–98)
MCV RBC AUTO: 92 FL (ref 82–98)
PLATELET # BLD AUTO: 162 THOUSANDS/UL (ref 149–390)
PLATELET # BLD AUTO: 165 THOUSANDS/UL (ref 149–390)
PMV BLD AUTO: 9.5 FL (ref 8.9–12.7)
PMV BLD AUTO: 9.9 FL (ref 8.9–12.7)
POTASSIUM SERPL-SCNC: 2.6 MMOL/L (ref 3.5–5.3)
POTASSIUM SERPL-SCNC: 3.5 MMOL/L (ref 3.5–5.3)
RBC # BLD AUTO: 4.55 MILLION/UL (ref 3.88–5.62)
RBC # BLD AUTO: 4.63 MILLION/UL (ref 3.88–5.62)
SODIUM SERPL-SCNC: 141 MMOL/L (ref 136–145)
SODIUM SERPL-SCNC: 147 MMOL/L (ref 136–145)
WBC # BLD AUTO: 10.03 THOUSAND/UL (ref 4.31–10.16)
WBC # BLD AUTO: 10.13 THOUSAND/UL (ref 4.31–10.16)

## 2021-06-16 PROCEDURE — 85027 COMPLETE CBC AUTOMATED: CPT | Performed by: SURGERY

## 2021-06-16 PROCEDURE — 83735 ASSAY OF MAGNESIUM: CPT | Performed by: SURGERY

## 2021-06-16 PROCEDURE — 82948 REAGENT STRIP/BLOOD GLUCOSE: CPT

## 2021-06-16 PROCEDURE — 80048 BASIC METABOLIC PNL TOTAL CA: CPT | Performed by: SURGERY

## 2021-06-16 RX ORDER — MAGNESIUM SULFATE HEPTAHYDRATE 40 MG/ML
2 INJECTION, SOLUTION INTRAVENOUS ONCE
Status: COMPLETED | OUTPATIENT
Start: 2021-06-16 | End: 2021-06-16

## 2021-06-16 RX ORDER — DEXTROSE, SODIUM CHLORIDE, AND POTASSIUM CHLORIDE 5; .45; .15 G/100ML; G/100ML; G/100ML
50 INJECTION INTRAVENOUS CONTINUOUS
Status: DISCONTINUED | OUTPATIENT
Start: 2021-06-16 | End: 2021-06-16

## 2021-06-16 RX ORDER — POTASSIUM CHLORIDE 20 MEQ/1
40 TABLET, EXTENDED RELEASE ORAL ONCE
Status: COMPLETED | OUTPATIENT
Start: 2021-06-16 | End: 2021-06-16

## 2021-06-16 RX ORDER — HYDROMORPHONE HCL/PF 1 MG/ML
0.5 SYRINGE (ML) INJECTION
Status: DISCONTINUED | OUTPATIENT
Start: 2021-06-16 | End: 2021-06-17 | Stop reason: HOSPADM

## 2021-06-16 RX ADMIN — HEPARIN SODIUM 5000 UNITS: 5000 INJECTION INTRAVENOUS; SUBCUTANEOUS at 21:12

## 2021-06-16 RX ADMIN — OXYCODONE HYDROCHLORIDE 10 MG: 10 TABLET ORAL at 12:03

## 2021-06-16 RX ADMIN — HYDROMORPHONE HYDROCHLORIDE 0.5 MG: 1 INJECTION, SOLUTION INTRAMUSCULAR; INTRAVENOUS; SUBCUTANEOUS at 04:57

## 2021-06-16 RX ADMIN — HYDROMORPHONE HYDROCHLORIDE 0.5 MG: 1 INJECTION, SOLUTION INTRAMUSCULAR; INTRAVENOUS; SUBCUTANEOUS at 13:56

## 2021-06-16 RX ADMIN — OXYCODONE HYDROCHLORIDE 10 MG: 10 TABLET ORAL at 08:01

## 2021-06-16 RX ADMIN — POTASSIUM CHLORIDE 40 MEQ: 1500 TABLET, EXTENDED RELEASE ORAL at 13:51

## 2021-06-16 RX ADMIN — INSULIN LISPRO 2 UNITS: 100 INJECTION, SOLUTION INTRAVENOUS; SUBCUTANEOUS at 17:04

## 2021-06-16 RX ADMIN — HYDROMORPHONE HYDROCHLORIDE 0.5 MG: 1 INJECTION, SOLUTION INTRAMUSCULAR; INTRAVENOUS; SUBCUTANEOUS at 09:25

## 2021-06-16 RX ADMIN — DEXTROSE, SODIUM CHLORIDE, AND POTASSIUM CHLORIDE 50 ML/HR: 5; .45; .15 INJECTION INTRAVENOUS at 08:07

## 2021-06-16 RX ADMIN — HEPARIN SODIUM 5000 UNITS: 5000 INJECTION INTRAVENOUS; SUBCUTANEOUS at 05:08

## 2021-06-16 RX ADMIN — INSULIN LISPRO 2 UNITS: 100 INJECTION, SOLUTION INTRAVENOUS; SUBCUTANEOUS at 00:01

## 2021-06-16 RX ADMIN — INSULIN LISPRO 4 UNITS: 100 INJECTION, SOLUTION INTRAVENOUS; SUBCUTANEOUS at 11:29

## 2021-06-16 RX ADMIN — HYDROMORPHONE HYDROCHLORIDE 0.5 MG: 1 INJECTION, SOLUTION INTRAMUSCULAR; INTRAVENOUS; SUBCUTANEOUS at 21:16

## 2021-06-16 RX ADMIN — HEPARIN SODIUM 5000 UNITS: 5000 INJECTION INTRAVENOUS; SUBCUTANEOUS at 13:51

## 2021-06-16 RX ADMIN — Medication 1 PATCH: at 08:01

## 2021-06-16 RX ADMIN — MAGNESIUM SULFATE HEPTAHYDRATE 2 G: 40 INJECTION, SOLUTION INTRAVENOUS at 13:48

## 2021-06-16 RX ADMIN — INSULIN LISPRO 2 UNITS: 100 INJECTION, SOLUTION INTRAVENOUS; SUBCUTANEOUS at 05:08

## 2021-06-16 RX ADMIN — OXYCODONE HYDROCHLORIDE 5 MG: 5 TABLET ORAL at 19:37

## 2021-06-16 NOTE — CASE MANAGEMENT
PT IS IDENTIFIED AS A READMISSION  PT IS NOT IDENTIFIED AS A BUNDLE  READMISSION RISK SCORE OF 11    Pt is transfer from [address]  to [facility] for a laparoscopic evaluation for small bowel obstruction  CM met with Pt with an introduction and explanation of role  Pt reported residing with his three children in a 2 story home with no use of DME and 3 steps to enter the home  Pt reported being independent with ADLs, continues to drive, had [employer]  services and was at a SNF in the past but cannot remember the provider agencies  Pt denied any mental health or drug/alcohol placements  Pt reported having a living will with his son Calvin Sacks as his assigned POA  CM requested a copy of the paperwork for his chart  Pt reported the use of [facility]  Family to transport upon d/c     CM reviewed d/c planning process including the following: identifying help at home, patient preference for d/c planning needs, Discharge Lounge, [facility] Meds to Bed program, availability of treatment team to discuss questions or concerns patient and/or family may have regarding understanding medications and recognizing signs and symptoms once discharged  CM also encouraged patient to follow up with all recommended appointments after discharge  Patient advised of importance for patient and family to participate in managing patients medical well being

## 2021-06-16 NOTE — PROGRESS NOTES
Progress Note - Colorectal Surgery   Aman Rocha 45 y o  male MRN: [id number]  Unit/Bed#: -01 Encounter: [id number]    Assessment:  51M w SBO psb 2/2 adhesive disease  6/15 Diagnostic lap, JONA    Plan:   Advance diet as tolerated   Wean IVF   PRN analgesia   AM labs   SQH/SCDs    Subjective/Objective     Subjective:   Complains abdominal pain but no nausea or vomiting, passing flatus and loose stool, tolerating CLD    Objective:    Blood pressure 114/73, pulse 79, temperature 98 °F (36 7 °C), temperature source Oral, resp  rate 19, SpO2 98 %  ,There is no height or weight on file to calculate BMI        Intake/Output Summary (Last 24 hours) at 6/16/2021 0625  Last data filed at 6/15/2021 1811  Gross per 24 hour   Intake 3110 ml   Output 270 ml   Net 2840 ml       Invasive Devices     Peripheral Intravenous Line            Peripheral IV 06/15/21 Left Arm <1 day                Physical Exam:   Gen:  NAD  CV:  warm, well-perfused  Lungs: nl effort  Abd:  soft, slightly distended, incision cdi, appropriate tender  Ext:  no CCE  Neuro: A&Ox3     Results from last 7 days   Lab Units 06/15/21  0552 06/14/21  2243 06/14/21  0604 06/12/21  0549   WBC Thousand/uL 5 47  --  4 65 6 77   HEMOGLOBIN g/dL 14 9  --  14 7 15 3   HEMATOCRIT % 44 0  --  42 0 44 1   PLATELETS Thousands/uL 155 172 156 176     Results from last 7 days   Lab Units 06/15/21  0552 06/14/21  0604 06/13/21  0622   POTASSIUM mmol/L 3 6 3 5 3 7   CHLORIDE mmol/L 108 105 102   CO2 mmol/L 27 30 29   BUN mg/dL 6 6 8   CREATININE mg/dL 0 47* 0 59 0 57   CALCIUM mg/dL 8 3 8 3* 8 1*

## 2021-06-16 NOTE — PLAN OF CARE
Problem: PAIN - ADULT  Goal: Verbalizes/displays adequate comfort level or baseline comfort level  Description: Interventions:  - Encourage patient to monitor pain and request assistance  - Assess pain using appropriate pain scale  - Administer analgesics based on type and severity of pain and evaluate response  - Implement non-pharmacological measures as appropriate and evaluate response  - Consider cultural and social influences on pain and pain management  - Notify physician/advanced practitioner if interventions unsuccessful or patient reports new pain  Outcome: Progressing     Problem: INFECTION - ADULT  Goal: Absence or prevention of progression during hospitalization  Description: INTERVENTIONS:  - Assess and monitor for signs and symptoms of infection  - Monitor lab/diagnostic results  - Monitor all insertion sites, i e  indwelling lines, tubes, and drains  - Monitor endotracheal if appropriate and nasal secretions for changes in amount and color  - [place] appropriate cooling/warming therapies per order  - Administer medications as ordered  - Instruct and encourage patient and family to use good hand hygiene technique  - Identify and instruct in appropriate isolation precautions for identified infection/condition  Outcome: Progressing  Goal: Absence of fever/infection during neutropenic period  Description: INTERVENTIONS:  - Monitor WBC    Outcome: Progressing     Problem: DISCHARGE PLANNING  Goal: Discharge to home or other facility with appropriate resources  Description: INTERVENTIONS:  - Identify barriers to discharge w/patient and caregiver  - Arrange for needed discharge resources and transportation as appropriate  - Identify discharge learning needs (meds, wound care, etc )  - Arrange for interpretive services to assist at discharge as needed  - Refer to Case Management Department for coordinating discharge planning if the patient needs post-hospital services based on physician/advanced practitioner order or complex needs related to functional status, cognitive ability, or social support system  Outcome: Progressing     Problem: Knowledge Deficit  Goal: Patient/family/caregiver demonstrates understanding of disease process, treatment plan, medications, and discharge instructions  Description: Complete learning assessment and assess knowledge base    Interventions:  - Provide teaching at level of understanding  - Provide teaching via preferred learning methods  Outcome: Progressing

## 2021-06-16 NOTE — QUICK NOTE
Post- OP Note - Colorectal Surgery   Aman Rocha 45 y o  male MRN: [id number]  Unit/Bed#: -01 Encounter: [id number]    Assessment:  Patient is a 45 y o  male s/p diagnostic laparoscopy/JONA 6/15    Plan:   CLD   IVF  I/Os- Please record urine output  DVT ppx  Pain/Nausea Control  OOB as tolerated   Incentive Spirometry    Subjective/Objective     Subjective:   Patient alert and oriented  Had BM  Tolerating CLD without N/V  Objective:    Blood pressure 113/73, pulse 89, temperature 98 °F (36 7 °C), temperature source Oral, resp  rate 20, SpO2 98 %  ,There is no height or weight on file to calculate BMI  Physical Exam:   Gen: NAD  HEENT: MMM  CV: RRR, well perfused  Lungs: Normal respiratory effort on RA  Abd: soft, appropriately tender, nondistended, Incisions clean dry and intact  Skin: warm/ dry  Neuro:  AxO x3

## 2021-06-17 VITALS
HEART RATE: 84 BPM | TEMPERATURE: 98.4 F | OXYGEN SATURATION: 95 % | DIASTOLIC BLOOD PRESSURE: 85 MMHG | SYSTOLIC BLOOD PRESSURE: 141 MMHG | RESPIRATION RATE: 17 BRPM

## 2021-06-17 LAB
ANION GAP SERPL CALCULATED.3IONS-SCNC: 2 MMOL/L (ref 4–13)
BASOPHILS # BLD AUTO: 0.01 THOUSANDS/ΜL (ref 0–0.1)
BASOPHILS NFR BLD AUTO: 0 % (ref 0–1)
BUN SERPL-MCNC: 10 MG/DL (ref 5–25)
CALCIUM SERPL-MCNC: 8.3 MG/DL (ref 8.3–10.1)
CHLORIDE SERPL-SCNC: 108 MMOL/L (ref 100–108)
CO2 SERPL-SCNC: 29 MMOL/L (ref 21–32)
CREAT SERPL-MCNC: 0.44 MG/DL (ref 0.6–1.3)
EOSINOPHIL # BLD AUTO: 0.2 THOUSAND/ΜL (ref 0–0.61)
EOSINOPHIL NFR BLD AUTO: 3 % (ref 0–6)
ERYTHROCYTE [DISTWIDTH] IN BLOOD BY AUTOMATED COUNT: 12.6 % (ref 11.6–15.1)
GFR SERPL CREATININE-BSD FRML MDRD: 131 ML/MIN/1.73SQ M
GLUCOSE SERPL-MCNC: 173 MG/DL (ref 65–140)
GLUCOSE SERPL-MCNC: 183 MG/DL (ref 65–140)
GLUCOSE SERPL-MCNC: 254 MG/DL (ref 65–140)
HCT VFR BLD AUTO: 39.9 % (ref 36.5–49.3)
HGB BLD-MCNC: 13.5 G/DL (ref 12–17)
IMM GRANULOCYTES # BLD AUTO: 0.02 THOUSAND/UL (ref 0–0.2)
IMM GRANULOCYTES NFR BLD AUTO: 0 % (ref 0–2)
LYMPHOCYTES # BLD AUTO: 1.89 THOUSANDS/ΜL (ref 0.6–4.47)
LYMPHOCYTES NFR BLD AUTO: 31 % (ref 14–44)
MCH RBC QN AUTO: 31.2 PG (ref 26.8–34.3)
MCHC RBC AUTO-ENTMCNC: 33.8 G/DL (ref 31.4–37.4)
MCV RBC AUTO: 92 FL (ref 82–98)
MONOCYTES # BLD AUTO: 0.55 THOUSAND/ΜL (ref 0.17–1.22)
MONOCYTES NFR BLD AUTO: 9 % (ref 4–12)
NEUTROPHILS # BLD AUTO: 3.46 THOUSANDS/ΜL (ref 1.85–7.62)
NEUTS SEG NFR BLD AUTO: 57 % (ref 43–75)
NRBC BLD AUTO-RTO: 0 /100 WBCS
PLATELET # BLD AUTO: 164 THOUSANDS/UL (ref 149–390)
PMV BLD AUTO: 10 FL (ref 8.9–12.7)
POTASSIUM SERPL-SCNC: 3.7 MMOL/L (ref 3.5–5.3)
RBC # BLD AUTO: 4.33 MILLION/UL (ref 3.88–5.62)
SODIUM SERPL-SCNC: 139 MMOL/L (ref 136–145)
WBC # BLD AUTO: 6.13 THOUSAND/UL (ref 4.31–10.16)

## 2021-06-17 PROCEDURE — 85025 COMPLETE CBC W/AUTO DIFF WBC: CPT | Performed by: STUDENT IN AN ORGANIZED HEALTH CARE EDUCATION/TRAINING PROGRAM

## 2021-06-17 PROCEDURE — 80048 BASIC METABOLIC PNL TOTAL CA: CPT | Performed by: STUDENT IN AN ORGANIZED HEALTH CARE EDUCATION/TRAINING PROGRAM

## 2021-06-17 PROCEDURE — 82948 REAGENT STRIP/BLOOD GLUCOSE: CPT

## 2021-06-17 RX ORDER — POTASSIUM CHLORIDE 20 MEQ/1
40 TABLET, EXTENDED RELEASE ORAL ONCE
Status: COMPLETED | OUTPATIENT
Start: 2021-06-17 | End: 2021-06-17

## 2021-06-17 RX ORDER — OXYCODONE HYDROCHLORIDE 5 MG/1
5 TABLET ORAL EVERY 6 HOURS PRN
Qty: 8 TABLET | Refills: 0 | Status: SHIPPED | OUTPATIENT
Start: 2021-06-17 | End: 2021-06-19

## 2021-06-17 RX ADMIN — INSULIN LISPRO 3 UNITS: 100 INJECTION, SOLUTION INTRAVENOUS; SUBCUTANEOUS at 00:16

## 2021-06-17 RX ADMIN — POTASSIUM CHLORIDE 40 MEQ: 1500 TABLET, EXTENDED RELEASE ORAL at 13:16

## 2021-06-17 RX ADMIN — Medication 1 PATCH: at 08:48

## 2021-06-17 RX ADMIN — INSULIN LISPRO 1 UNITS: 100 INJECTION, SOLUTION INTRAVENOUS; SUBCUTANEOUS at 08:46

## 2021-06-17 RX ADMIN — OXYCODONE HYDROCHLORIDE 10 MG: 10 TABLET ORAL at 09:07

## 2021-06-17 RX ADMIN — HEPARIN SODIUM 5000 UNITS: 5000 INJECTION INTRAVENOUS; SUBCUTANEOUS at 05:59

## 2021-06-17 NOTE — DISCHARGE INSTRUCTIONS
Bowel Obstruction   WHAT YOU NEED TO KNOW:   A bowel obstruction occurs when your large or small intestine is completely or partly blocked  The blockage prevents food and waste from passing through normally  WHILE YOU ARE HERE:   Informed consent:  Informed consent is a legal document that explains the tests, treatments, or procedures that you may need  Informed consent means you understand what will be done and can make decisions about what you want  You give your permission when you sign the consent form  You can have someone sign this form for you if you are not able to sign it  You have the right to understand your medical care in words you know  Before you sign the consent form, understand the risks and benefits of what will be done  Make sure all your questions are answered  IV:  An IV is a small tube placed in your vein that is used to give you medicine, liquids, or nutrition  You may not be able to eat or drink anything until your healthcare provider says it is okay  Sterling catheter:  A Tserling catheter is a tube that healthcare providers put into your bladder to drain your urine into a bag  Keep the bag below your waist  This will help prevent infection and other problems caused by urine flowing back into your bladder  Do not pull on the catheter, because this may cause pain and bleeding, and the catheter could come out  Keep the catheter tubing free of kinks so your urine will flow into the bag  Healthcare providers will remove the catheter as soon as possible, to help prevent infection  Medicine:  Antibiotics may be given to help treat or prevent an infection caused by bacteria  Tests:   · Blood tests  may show if you have an infection, or if you are dehydrated  Dehydration can develop when your intestines cannot absorb liquid properly  · An x-ray  takes pictures of the organs inside your abdomen and chest  The pictures are used to look for an obstruction      · A CT or MRI scan  may be used to take pictures of your intestines  The pictures may show the location and cause of your blockage  You may be given a dye before the pictures are taken to help healthcare providers see the blockage better  Tell the healthcare provider if you have ever had an allergic reaction to contrast dye  Do not enter the MRI room with anything metal  Metal can cause serious injury  Tell the healthcare provider if you have any metal in or on your body  · An ultrasound  uses sound waves to show pictures on a monitor  An ultrasound may be done to find the location of your obstruction  Treatment:   · A nasogastric tube  may be put into your nose  The tube passes through your throat and is guided into your stomach  The tube will be attached to a suction device that removes air and fluid from your stomach  · Surgery  may be done to treat the cause of the blockage  RISKS:   Even with treatment, it may take some time for your bowel movements to return to normal  A bowel obstruction increases your risk for another bowel obstruction in the future  A bowel obstruction may reduce blood flow to your intestines, which may cause that tissue to die  The pressure inside your intestine may cause the intestine to rupture  This may cause a life-threatening infection  CARE AGREEMENT:   You have the right to help plan your care  Learn about your health condition and how it may be treated  Discuss treatment options with your healthcare providers to decide what care you want to receive  You always have the right to refuse treatment  © Copyright [address] Information is for End User's use only and may not be sold, redistributed or otherwise used for commercial purposes  All illustrations and images included in CareNotes® are the copyrighted property of A D A M , Inc  or [facility] Rafiq Enriquez   The above information is an  only  It is not intended as medical advice for individual conditions or treatments   Talk to your doctor, nurse or pharmacist before following any medical regimen to see if it is safe and effective for you

## 2021-06-17 NOTE — PROGRESS NOTES
Progress Note - Colorectal Surgery   Aman Rocha 45 y o  male MRN: [id number]  Unit/Bed#: -01 Encounter: [id number]    Assessment:  51M w SBO psb 2/2 adhesive disease  6/15 Diagnostic lap, JONA     Plan:  · Low residue  · PRN analgesia  · AM labs  · SQH/SCDs  · DC today       Subjective/Objective     Subjective: Tolerating diet, no nausea or vomiting, +ve flatus no bm    Objective:    Blood pressure 134/84, pulse 77, temperature 98 °F (36 7 °C), resp  rate 20, SpO2 95 %  ,There is no height or weight on file to calculate BMI        Intake/Output Summary (Last 24 hours) at 6/17/2021 0737  Last data filed at 6/16/2021 2357  Gross per 24 hour   Intake 840 ml   Output --   Net 840 ml       Invasive Devices     Peripheral Intravenous Line            Peripheral IV 06/15/21 Left Arm 1 day                Physical Exam:   Gen:  NAD  CV:  warm, well-perfused  Lungs: nl effort  Abd:  soft, NT/ slightly distended, incision cdi  Ext:  no CCE  Neuro: A&Ox3     Results from last 7 days   Lab Units 06/17/21  0434 06/16/21  0836 06/16/21  0805   WBC Thousand/uL 6 13 10 13 10 03   HEMOGLOBIN g/dL 13 5 14 3 13 9   HEMATOCRIT % 39 9 41 8 42 0   PLATELETS Thousands/uL 164 165 162     Results from last 7 days   Lab Units 06/17/21  0434 06/16/21  0805 06/16/21  0531   POTASSIUM mmol/L 3 7 3 5 2 6*   CHLORIDE mmol/L 108 109* 120*   CO2 mmol/L 29 26 20*   BUN mg/dL 10 7 5   CREATININE mg/dL 0 44* 0 64 0 21*   CALCIUM mg/dL 8 3 7 8* 5 8*

## 2021-06-18 NOTE — UTILIZATION REVIEW
Notification of Discharge   This is a Notification of Discharge from our facility [address]  Please be advised that this patient has been discharge from our facility  Below you will find the admission and discharge date and time including the patients disposition  UTILIZATION REVIEW CONTACT:  Juan J Valladares  Utilization   Network Utilization Review Department  Phone: [phone] x carefully listen to the prompts  All voicemails are confidential   Email: [email or web address]     PHYSICIAN ADVISORY SERVICES:  FOR SBUH-PV-NOGP REVIEW - MEDICAL NECESSITY DENIAL  Phone: [phone]  Fax: [phone]  Email: [email or web address]     PRESENTATION DATE: 6/14/2021  8:09 PM  OBERVATION ADMISSION DATE:   INPATIENT ADMISSION DATE: 6/14/21  8:09 PM   DISCHARGE DATE: 6/17/2021  1:20 PM  DISPOSITION: Home/Self Care Home/Self Care      IMPORTANT INFORMATION:  Send all requests for admission clinical reviews, approved or denied determinations and any other requests to dedicated fax number below belonging to the campus where the patient is receiving treatment   List of dedicated fax numbers:  [address] DENIALS (Administrative/Medical Necessity) [phone]   [address] (Maternity/NICU/Pediatrics) [phone]   Katey Russell [phone]   Madison Malik [phone]   Cullen Opitz [phone]   [facility] [address] [place] [address] [phone]   [facility]  [phone]   [address] [facility]  [address] [facility] [address] [facility] [phone]

## 2021-06-18 NOTE — DISCHARGE SUMMARY
Discharge Summary - Aman Rocha 45 y o  male MRN: [id number]    Unit/Bed#: -01 Encounter: [id number]    Admission Date: 6/14/2021     Admitting Diagnosis: Small bowel obstruction [facility]) [K56 [id number]]    Discharge Date: 6/17/2021    HPI:  HPI performed by Dakota Taylor MD  "Bernadette Newton is a 45 y o  male who presents with abdominal pain and nausea  Patient was admitted at [facility] for the last several days for persistent abdominal pain  Last bowel movement was watery and was yesterday  He has not been passing flatus  He states symptoms feel similar to previous bowel obstruction, for which he required ex lap JONA approximately 5 years ago  He had an EGD today which was unremarkable  He does have a history of CVA and paroxysmal A fib but does not currently take any anticoagulation "    Procedures Performed:   6/15 Diagnostic laparoscopy, lysis of adhesions -[facility] Course:  HPI as mentioned above  Patient underwent diagnostic laparoscopy, lysis of adhesions, extensive  Throughout his hospital stay his diet was slowly advanced without complications  He remained without nausea or vomiting, was out of bed and ambulating, was utilizing his incentive spirometer, and was pain controlled on p o  Pain control regimen  He was cleared for discharge from surgical standpoint on postoperative day 2  All discharge instructions were discussed with the patient  Patient was in agreement with plan  Patient was sent with oxycodone 5 mg every 6 hours as needed for pain, 16 tablets, no refills  Discharge Diagnosis:  Small bowel obstruction status post diagnostic laparoscopy, lysis of adhesions    Condition at Discharge: good     Discharge Medications: See after visit summary for reconciled discharge medications provided to patient and family  Discharge instructions/Information to patient and family:   See after visit summary for information provided to patient and family        Provisions for Follow-Up Care:  See after visit summary for information related to follow-up care and any pertinent home health orders  Disposition: Home    Planned Readmission: No    Discharge Statement   I spent 35 minutes discharging the patient  This time was spent on the day of discharge  I had direct contact with the patient on the day of discharge  Additional documentation is required if more than 30 minutes were spent on discharge       Signature:   Solomon Bello  Date: 6/18/2021 Time: 3:11 PM

## 2024-03-19 ENCOUNTER — APPOINTMENT (EMERGENCY)
Dept: RADIOLOGY | Facility: HOSPITAL | Age: 54
DRG: 871 | End: 2024-03-19
Payer: MEDICARE

## 2024-03-19 ENCOUNTER — HOSPITAL ENCOUNTER (INPATIENT)
Facility: HOSPITAL | Age: 54
LOS: 6 days | Discharge: HOME HEALTH | DRG: 871 | End: 2024-03-25
Attending: EMERGENCY MEDICINE | Admitting: INTERNAL MEDICINE
Payer: MEDICARE

## 2024-03-19 ENCOUNTER — APPOINTMENT (EMERGENCY)
Dept: CT IMAGING | Facility: HOSPITAL | Age: 54
DRG: 871 | End: 2024-03-19
Payer: MEDICARE

## 2024-03-19 DIAGNOSIS — Z72.0 NICOTINE ABUSE: ICD-10-CM

## 2024-03-19 DIAGNOSIS — A41.9 SEVERE SEPSIS (HCC): ICD-10-CM

## 2024-03-19 DIAGNOSIS — R65.20 SEVERE SEPSIS (HCC): ICD-10-CM

## 2024-03-19 DIAGNOSIS — K52.9 COLITIS: ICD-10-CM

## 2024-03-19 DIAGNOSIS — C11.9 NASOPHARYNGEAL CANCER (HCC): ICD-10-CM

## 2024-03-19 DIAGNOSIS — R73.9 HYPERGLYCEMIA: ICD-10-CM

## 2024-03-19 DIAGNOSIS — E86.0 DEHYDRATION: ICD-10-CM

## 2024-03-19 DIAGNOSIS — K12.30 MUCOSITIS AFTER THERAPY: ICD-10-CM

## 2024-03-19 DIAGNOSIS — K20.90 ESOPHAGITIS: Primary | ICD-10-CM

## 2024-03-19 DIAGNOSIS — I10 PRIMARY HYPERTENSION: ICD-10-CM

## 2024-03-19 PROBLEM — R11.2 NAUSEA VOMITING AND DIARRHEA: Status: ACTIVE | Noted: 2024-03-19

## 2024-03-19 PROBLEM — R19.7 NAUSEA VOMITING AND DIARRHEA: Status: ACTIVE | Noted: 2024-03-19

## 2024-03-19 PROBLEM — E11.10 DKA (DIABETIC KETOACIDOSIS) (HCC): Status: ACTIVE | Noted: 2024-03-19

## 2024-03-19 LAB
2HR DELTA HS TROPONIN: 0 NG/L
ALBUMIN SERPL BCP-MCNC: 4.5 G/DL (ref 3.5–5)
ALP SERPL-CCNC: 93 U/L (ref 34–104)
ALT SERPL W P-5'-P-CCNC: 32 U/L (ref 7–52)
ANION GAP SERPL CALCULATED.3IONS-SCNC: 13 MMOL/L (ref 4–13)
ANION GAP SERPL CALCULATED.3IONS-SCNC: 17 MMOL/L (ref 4–13)
ANION GAP SERPL CALCULATED.3IONS-SCNC: 9 MMOL/L (ref 4–13)
APTT PPP: 23 SECONDS (ref 23–37)
AST SERPL W P-5'-P-CCNC: 14 U/L (ref 13–39)
ATRIAL RATE: 133 BPM
B-OH-BUTYR SERPL-MCNC: 2.41 MMOL/L (ref 0.02–0.27)
BASE EX.OXY STD BLDV CALC-SCNC: 89 % (ref 60–80)
BASE EXCESS BLDV CALC-SCNC: 0.4 MMOL/L
BASOPHILS # BLD MANUAL: 0 THOUSAND/UL (ref 0–0.1)
BASOPHILS NFR MAR MANUAL: 0 % (ref 0–1)
BILIRUB SERPL-MCNC: 1.28 MG/DL (ref 0.2–1)
BNP SERPL-MCNC: 32 PG/ML (ref 0–100)
BUN SERPL-MCNC: 23 MG/DL (ref 5–25)
BUN SERPL-MCNC: 24 MG/DL (ref 5–25)
BUN SERPL-MCNC: 25 MG/DL (ref 5–25)
CALCIUM SERPL-MCNC: 8.5 MG/DL (ref 8.4–10.2)
CALCIUM SERPL-MCNC: 8.9 MG/DL (ref 8.4–10.2)
CALCIUM SERPL-MCNC: 9.7 MG/DL (ref 8.4–10.2)
CARDIAC TROPONIN I PNL SERPL HS: 3 NG/L
CARDIAC TROPONIN I PNL SERPL HS: 3 NG/L
CHLORIDE SERPL-SCNC: 101 MMOL/L (ref 96–108)
CHLORIDE SERPL-SCNC: 92 MMOL/L (ref 96–108)
CHLORIDE SERPL-SCNC: 97 MMOL/L (ref 96–108)
CO2 SERPL-SCNC: 23 MMOL/L (ref 21–32)
CO2 SERPL-SCNC: 24 MMOL/L (ref 21–32)
CO2 SERPL-SCNC: 26 MMOL/L (ref 21–32)
CREAT SERPL-MCNC: 0.48 MG/DL (ref 0.6–1.3)
CREAT SERPL-MCNC: 0.52 MG/DL (ref 0.6–1.3)
CREAT SERPL-MCNC: 0.68 MG/DL (ref 0.6–1.3)
D DIMER PPP FEU-MCNC: 0.85 UG/ML FEU
EOSINOPHIL # BLD MANUAL: 0.05 THOUSAND/UL (ref 0–0.4)
EOSINOPHIL NFR BLD MANUAL: 1 % (ref 0–6)
ERYTHROCYTE [DISTWIDTH] IN BLOOD BY AUTOMATED COUNT: 13.3 % (ref 11.6–15.1)
FLUAV RNA RESP QL NAA+PROBE: NEGATIVE
FLUBV RNA RESP QL NAA+PROBE: NEGATIVE
GFR SERPL CREATININE-BSD FRML MDRD: 107 ML/MIN/1.73SQ M
GFR SERPL CREATININE-BSD FRML MDRD: 120 ML/MIN/1.73SQ M
GFR SERPL CREATININE-BSD FRML MDRD: 124 ML/MIN/1.73SQ M
GLUCOSE SERPL-MCNC: 197 MG/DL (ref 65–140)
GLUCOSE SERPL-MCNC: 205 MG/DL (ref 65–140)
GLUCOSE SERPL-MCNC: 248 MG/DL (ref 65–140)
GLUCOSE SERPL-MCNC: 266 MG/DL (ref 65–140)
GLUCOSE SERPL-MCNC: 306 MG/DL (ref 65–140)
GLUCOSE SERPL-MCNC: 337 MG/DL (ref 65–140)
GLUCOSE SERPL-MCNC: 409 MG/DL (ref 65–140)
HCO3 BLDV-SCNC: 23.7 MMOL/L (ref 24–30)
HCT VFR BLD AUTO: 52.8 % (ref 36.5–49.3)
HGB BLD-MCNC: 18.4 G/DL (ref 12–17)
INR PPP: 0.89 (ref 0.84–1.19)
LACTATE SERPL-SCNC: 2 MMOL/L (ref 0.5–2)
LACTATE SERPL-SCNC: 2.7 MMOL/L (ref 0.5–2)
LIPASE SERPL-CCNC: <6 U/L (ref 11–82)
LYMPHOCYTES # BLD AUTO: 1.08 THOUSAND/UL (ref 0.6–4.47)
LYMPHOCYTES # BLD AUTO: 20 % (ref 14–44)
MAGNESIUM SERPL-MCNC: 1.7 MG/DL (ref 1.9–2.7)
MAGNESIUM SERPL-MCNC: 2 MG/DL (ref 1.9–2.7)
MAGNESIUM SERPL-MCNC: 3.7 MG/DL (ref 1.9–2.7)
MCH RBC QN AUTO: 30.7 PG (ref 26.8–34.3)
MCHC RBC AUTO-ENTMCNC: 34.8 G/DL (ref 31.4–37.4)
MCV RBC AUTO: 88 FL (ref 82–98)
MONOCYTES # BLD AUTO: 0 THOUSAND/UL (ref 0–1.22)
MONOCYTES NFR BLD: 0 % (ref 4–12)
NEUTROPHILS # BLD MANUAL: 4.27 THOUSAND/UL (ref 1.85–7.62)
NEUTS BAND NFR BLD MANUAL: 11 % (ref 0–8)
NEUTS SEG NFR BLD AUTO: 68 % (ref 43–75)
O2 CT BLDV-SCNC: 23.2 ML/DL
P AXIS: 62 DEGREES
PCO2 BLDV: 35.2 MM HG (ref 42–50)
PH BLDV: 7.45 [PH] (ref 7.3–7.4)
PHOSPHATE SERPL-MCNC: 3.1 MG/DL (ref 2.7–4.5)
PLATELET # BLD AUTO: 173 THOUSANDS/UL (ref 149–390)
PLATELET BLD QL SMEAR: ADEQUATE
PMV BLD AUTO: 10.6 FL (ref 8.9–12.7)
PO2 BLDV: 57 MM HG (ref 35–45)
POTASSIUM SERPL-SCNC: 3 MMOL/L (ref 3.5–5.3)
POTASSIUM SERPL-SCNC: 3.8 MMOL/L (ref 3.5–5.3)
POTASSIUM SERPL-SCNC: 3.9 MMOL/L (ref 3.5–5.3)
PR INTERVAL: 128 MS
PROCALCITONIN SERPL-MCNC: 0.12 NG/ML
PROT SERPL-MCNC: 7.2 G/DL (ref 6.4–8.4)
PROTHROMBIN TIME: 12.7 SECONDS (ref 11.6–14.5)
QRS AXIS: 64 DEGREES
QRSD INTERVAL: 80 MS
QT INTERVAL: 310 MS
QTC INTERVAL: 461 MS
RBC # BLD AUTO: 5.99 MILLION/UL (ref 3.88–5.62)
RBC MORPH BLD: NORMAL
RSV RNA RESP QL NAA+PROBE: NEGATIVE
SARS-COV-2 RNA RESP QL NAA+PROBE: NEGATIVE
SODIUM SERPL-SCNC: 132 MMOL/L (ref 135–147)
SODIUM SERPL-SCNC: 134 MMOL/L (ref 135–147)
SODIUM SERPL-SCNC: 136 MMOL/L (ref 135–147)
T WAVE AXIS: 72 DEGREES
URATE SERPL-MCNC: 5 MG/DL (ref 3.5–8.5)
VENTRICULAR RATE: 133 BPM
WBC # BLD AUTO: 5.4 THOUSAND/UL (ref 4.31–10.16)

## 2024-03-19 PROCEDURE — 99285 EMERGENCY DEPT VISIT HI MDM: CPT | Performed by: EMERGENCY MEDICINE

## 2024-03-19 PROCEDURE — 85379 FIBRIN DEGRADATION QUANT: CPT

## 2024-03-19 PROCEDURE — C9113 INJ PANTOPRAZOLE SODIUM, VIA: HCPCS | Performed by: INTERNAL MEDICINE

## 2024-03-19 PROCEDURE — 82010 KETONE BODYS QUAN: CPT | Performed by: EMERGENCY MEDICINE

## 2024-03-19 PROCEDURE — 71045 X-RAY EXAM CHEST 1 VIEW: CPT

## 2024-03-19 PROCEDURE — 83880 ASSAY OF NATRIURETIC PEPTIDE: CPT

## 2024-03-19 PROCEDURE — 80053 COMPREHEN METABOLIC PANEL: CPT

## 2024-03-19 PROCEDURE — 93010 ELECTROCARDIOGRAM REPORT: CPT | Performed by: INTERNAL MEDICINE

## 2024-03-19 PROCEDURE — 83735 ASSAY OF MAGNESIUM: CPT | Performed by: INTERNAL MEDICINE

## 2024-03-19 PROCEDURE — 84145 PROCALCITONIN (PCT): CPT

## 2024-03-19 PROCEDURE — 84550 ASSAY OF BLOOD/URIC ACID: CPT

## 2024-03-19 PROCEDURE — 96367 TX/PROPH/DG ADDL SEQ IV INF: CPT

## 2024-03-19 PROCEDURE — 84132 ASSAY OF SERUM POTASSIUM: CPT

## 2024-03-19 PROCEDURE — 80048 BASIC METABOLIC PNL TOTAL CA: CPT | Performed by: INTERNAL MEDICINE

## 2024-03-19 PROCEDURE — 93005 ELECTROCARDIOGRAM TRACING: CPT

## 2024-03-19 PROCEDURE — 85027 COMPLETE CBC AUTOMATED: CPT

## 2024-03-19 PROCEDURE — 80048 BASIC METABOLIC PNL TOTAL CA: CPT

## 2024-03-19 PROCEDURE — 82330 ASSAY OF CALCIUM: CPT

## 2024-03-19 PROCEDURE — 71275 CT ANGIOGRAPHY CHEST: CPT

## 2024-03-19 PROCEDURE — 36415 COLL VENOUS BLD VENIPUNCTURE: CPT

## 2024-03-19 PROCEDURE — 36600 WITHDRAWAL OF ARTERIAL BLOOD: CPT

## 2024-03-19 PROCEDURE — 83690 ASSAY OF LIPASE: CPT

## 2024-03-19 PROCEDURE — 74177 CT ABD & PELVIS W/CONTRAST: CPT

## 2024-03-19 PROCEDURE — 82803 BLOOD GASES ANY COMBINATION: CPT

## 2024-03-19 PROCEDURE — 96375 TX/PRO/DX INJ NEW DRUG ADDON: CPT

## 2024-03-19 PROCEDURE — 84100 ASSAY OF PHOSPHORUS: CPT

## 2024-03-19 PROCEDURE — 83735 ASSAY OF MAGNESIUM: CPT

## 2024-03-19 PROCEDURE — 83605 ASSAY OF LACTIC ACID: CPT

## 2024-03-19 PROCEDURE — 82947 ASSAY GLUCOSE BLOOD QUANT: CPT

## 2024-03-19 PROCEDURE — 85007 BL SMEAR W/DIFF WBC COUNT: CPT

## 2024-03-19 PROCEDURE — 0241U HB NFCT DS VIR RESP RNA 4 TRGT: CPT

## 2024-03-19 PROCEDURE — 82805 BLOOD GASES W/O2 SATURATION: CPT | Performed by: EMERGENCY MEDICINE

## 2024-03-19 PROCEDURE — 96366 THER/PROPH/DIAG IV INF ADDON: CPT

## 2024-03-19 PROCEDURE — 87040 BLOOD CULTURE FOR BACTERIA: CPT

## 2024-03-19 PROCEDURE — 85014 HEMATOCRIT: CPT

## 2024-03-19 PROCEDURE — 96365 THER/PROPH/DIAG IV INF INIT: CPT

## 2024-03-19 PROCEDURE — 85730 THROMBOPLASTIN TIME PARTIAL: CPT

## 2024-03-19 PROCEDURE — 99285 EMERGENCY DEPT VISIT HI MDM: CPT

## 2024-03-19 PROCEDURE — 84295 ASSAY OF SERUM SODIUM: CPT

## 2024-03-19 PROCEDURE — 82948 REAGENT STRIP/BLOOD GLUCOSE: CPT

## 2024-03-19 PROCEDURE — 84484 ASSAY OF TROPONIN QUANT: CPT

## 2024-03-19 PROCEDURE — 85610 PROTHROMBIN TIME: CPT

## 2024-03-19 PROCEDURE — 99223 1ST HOSP IP/OBS HIGH 75: CPT | Performed by: INTERNAL MEDICINE

## 2024-03-19 RX ORDER — GABAPENTIN 300 MG/1
600 CAPSULE ORAL 3 TIMES DAILY
Status: DISCONTINUED | OUTPATIENT
Start: 2024-03-19 | End: 2024-03-25 | Stop reason: HOSPADM

## 2024-03-19 RX ORDER — DEXTROSE MONOHYDRATE 50 MG/ML
100 INJECTION, SOLUTION INTRAVENOUS CONTINUOUS
Status: DISCONTINUED | OUTPATIENT
Start: 2024-03-19 | End: 2024-03-19

## 2024-03-19 RX ORDER — ALBUTEROL SULFATE 90 UG/1
2 AEROSOL, METERED RESPIRATORY (INHALATION) EVERY 4 HOURS PRN
Status: DISCONTINUED | OUTPATIENT
Start: 2024-03-19 | End: 2024-03-25 | Stop reason: HOSPADM

## 2024-03-19 RX ORDER — POTASSIUM CHLORIDE 20 MEQ/1
40 TABLET, EXTENDED RELEASE ORAL ONCE
Qty: 2 TABLET | Refills: 0 | Status: DISCONTINUED | OUTPATIENT
Start: 2024-03-19 | End: 2024-03-20

## 2024-03-19 RX ORDER — POTASSIUM CHLORIDE 29.8 MG/ML
40 INJECTION INTRAVENOUS ONCE
Status: DISCONTINUED | OUTPATIENT
Start: 2024-03-19 | End: 2024-03-19 | Stop reason: SDUPTHER

## 2024-03-19 RX ORDER — INSULIN LISPRO 100 [IU]/ML
1-6 INJECTION, SOLUTION INTRAVENOUS; SUBCUTANEOUS
Status: DISCONTINUED | OUTPATIENT
Start: 2024-03-20 | End: 2024-03-20

## 2024-03-19 RX ORDER — POTASSIUM CHLORIDE 14.9 MG/ML
20 INJECTION INTRAVENOUS 2 TIMES DAILY
Qty: 200 ML | Refills: 0 | Status: COMPLETED | OUTPATIENT
Start: 2024-03-19 | End: 2024-03-20

## 2024-03-19 RX ORDER — INSULIN GLARGINE 100 [IU]/ML
12 INJECTION, SOLUTION SUBCUTANEOUS
Status: DISCONTINUED | OUTPATIENT
Start: 2024-03-19 | End: 2024-03-20

## 2024-03-19 RX ORDER — INSULIN LISPRO 100 [IU]/ML
4 INJECTION, SOLUTION INTRAVENOUS; SUBCUTANEOUS
Status: DISCONTINUED | OUTPATIENT
Start: 2024-03-20 | End: 2024-03-24

## 2024-03-19 RX ORDER — CARVEDILOL 6.25 MG/1
6.25 TABLET ORAL 2 TIMES DAILY WITH MEALS
Status: DISCONTINUED | OUTPATIENT
Start: 2024-03-20 | End: 2024-03-25 | Stop reason: HOSPADM

## 2024-03-19 RX ORDER — SODIUM CHLORIDE 9 MG/ML
125 INJECTION, SOLUTION INTRAVENOUS CONTINUOUS
Status: DISCONTINUED | OUTPATIENT
Start: 2024-03-19 | End: 2024-03-20

## 2024-03-19 RX ORDER — DULOXETIN HYDROCHLORIDE 30 MG/1
30 CAPSULE, DELAYED RELEASE ORAL DAILY
Status: DISCONTINUED | OUTPATIENT
Start: 2024-03-20 | End: 2024-03-25 | Stop reason: HOSPADM

## 2024-03-19 RX ORDER — METOCLOPRAMIDE HYDROCHLORIDE 5 MG/ML
10 INJECTION INTRAMUSCULAR; INTRAVENOUS EVERY 6 HOURS PRN
Status: DISCONTINUED | OUTPATIENT
Start: 2024-03-19 | End: 2024-03-25 | Stop reason: HOSPADM

## 2024-03-19 RX ORDER — METOCLOPRAMIDE HYDROCHLORIDE 5 MG/ML
10 INJECTION INTRAMUSCULAR; INTRAVENOUS EVERY 6 HOURS PRN
Status: DISCONTINUED | OUTPATIENT
Start: 2024-03-19 | End: 2024-03-19

## 2024-03-19 RX ORDER — DEXAMETHASONE 4 MG/1
8 TABLET ORAL DAILY
Status: DISCONTINUED | OUTPATIENT
Start: 2024-03-20 | End: 2024-03-20

## 2024-03-19 RX ORDER — METRONIDAZOLE 500 MG/100ML
500 INJECTION, SOLUTION INTRAVENOUS ONCE
Status: COMPLETED | OUTPATIENT
Start: 2024-03-19 | End: 2024-03-19

## 2024-03-19 RX ORDER — ONDANSETRON 2 MG/ML
4 INJECTION INTRAMUSCULAR; INTRAVENOUS ONCE
Status: COMPLETED | OUTPATIENT
Start: 2024-03-19 | End: 2024-03-19

## 2024-03-19 RX ORDER — METRONIDAZOLE 500 MG/100ML
500 INJECTION, SOLUTION INTRAVENOUS EVERY 8 HOURS
Status: DISCONTINUED | OUTPATIENT
Start: 2024-03-19 | End: 2024-03-23

## 2024-03-19 RX ORDER — PANTOPRAZOLE SODIUM 40 MG/10ML
40 INJECTION, POWDER, LYOPHILIZED, FOR SOLUTION INTRAVENOUS ONCE
Status: COMPLETED | OUTPATIENT
Start: 2024-03-19 | End: 2024-03-19

## 2024-03-19 RX ORDER — MAGNESIUM SULFATE HEPTAHYDRATE 40 MG/ML
2 INJECTION, SOLUTION INTRAVENOUS ONCE
Status: COMPLETED | OUTPATIENT
Start: 2024-03-19 | End: 2024-03-19

## 2024-03-19 RX ORDER — PANTOPRAZOLE SODIUM 40 MG/1
40 TABLET, DELAYED RELEASE ORAL
Status: DISCONTINUED | OUTPATIENT
Start: 2024-03-20 | End: 2024-03-20

## 2024-03-19 RX ORDER — KETOROLAC TROMETHAMINE 30 MG/ML
15 INJECTION, SOLUTION INTRAMUSCULAR; INTRAVENOUS ONCE
Status: COMPLETED | OUTPATIENT
Start: 2024-03-19 | End: 2024-03-19

## 2024-03-19 RX ORDER — ONDANSETRON 2 MG/ML
4 INJECTION INTRAMUSCULAR; INTRAVENOUS EVERY 4 HOURS PRN
Status: DISCONTINUED | OUTPATIENT
Start: 2024-03-19 | End: 2024-03-25 | Stop reason: HOSPADM

## 2024-03-19 RX ORDER — ENOXAPARIN SODIUM 100 MG/ML
40 INJECTION SUBCUTANEOUS DAILY
Status: DISCONTINUED | OUTPATIENT
Start: 2024-03-20 | End: 2024-03-25 | Stop reason: HOSPADM

## 2024-03-19 RX ORDER — AMLODIPINE BESYLATE 5 MG/1
5 TABLET ORAL DAILY
Status: DISCONTINUED | OUTPATIENT
Start: 2024-03-20 | End: 2024-03-25 | Stop reason: HOSPADM

## 2024-03-19 RX ORDER — OLANZAPINE 2.5 MG/1
5 TABLET, FILM COATED ORAL
Status: DISCONTINUED | OUTPATIENT
Start: 2024-03-19 | End: 2024-03-25 | Stop reason: HOSPADM

## 2024-03-19 RX ADMIN — SODIUM CHLORIDE 125 ML/HR: 0.9 INJECTION, SOLUTION INTRAVENOUS at 18:07

## 2024-03-19 RX ADMIN — INSULIN GLARGINE 12 UNITS: 100 INJECTION, SOLUTION SUBCUTANEOUS at 22:47

## 2024-03-19 RX ADMIN — METRONIDAZOLE 500 MG: 500 INJECTION, SOLUTION INTRAVENOUS at 20:39

## 2024-03-19 RX ADMIN — POTASSIUM CHLORIDE 20 MEQ: 14.9 INJECTION, SOLUTION INTRAVENOUS at 23:38

## 2024-03-19 RX ADMIN — PANTOPRAZOLE SODIUM 40 MG: 40 INJECTION, POWDER, FOR SOLUTION INTRAVENOUS at 23:38

## 2024-03-19 RX ADMIN — PIPERACILLIN SODIUM AND TAZOBACTAM SODIUM 4.5 G: 36; 4.5 INJECTION, POWDER, LYOPHILIZED, FOR SOLUTION INTRAVENOUS at 21:50

## 2024-03-19 RX ADMIN — IOHEXOL 100 ML: 350 INJECTION, SOLUTION INTRAVENOUS at 10:24

## 2024-03-19 RX ADMIN — MAGNESIUM SULFATE HEPTAHYDRATE 2 G: 2 INJECTION, SOLUTION INTRAVENOUS at 20:45

## 2024-03-19 RX ADMIN — Medication 1000 MG: at 10:58

## 2024-03-19 RX ADMIN — SODIUM CHLORIDE, SODIUM LACTATE, POTASSIUM CHLORIDE, AND CALCIUM CHLORIDE 1000 ML: .6; .31; .03; .02 INJECTION, SOLUTION INTRAVENOUS at 08:45

## 2024-03-19 RX ADMIN — SODIUM CHLORIDE 6 UNITS/HR: 9 INJECTION, SOLUTION INTRAVENOUS at 17:41

## 2024-03-19 RX ADMIN — SODIUM CHLORIDE, SODIUM LACTATE, POTASSIUM CHLORIDE, AND CALCIUM CHLORIDE 1000 ML: .6; .31; .03; .02 INJECTION, SOLUTION INTRAVENOUS at 09:30

## 2024-03-19 RX ADMIN — ONDANSETRON 4 MG: 2 INJECTION INTRAMUSCULAR; INTRAVENOUS at 21:50

## 2024-03-19 RX ADMIN — KETOROLAC TROMETHAMINE 15 MG: 30 INJECTION, SOLUTION INTRAMUSCULAR; INTRAVENOUS at 09:51

## 2024-03-19 RX ADMIN — METRONIDAZOLE 500 MG: 500 INJECTION, SOLUTION INTRAVENOUS at 11:43

## 2024-03-19 RX ADMIN — ONDANSETRON 4 MG: 2 INJECTION INTRAMUSCULAR; INTRAVENOUS at 08:45

## 2024-03-19 NOTE — QUICK NOTE
Received a message from Dr. Albrecht about Aman Rocha is being admitted suspected colitis / infection and also is being treated for DKA this admission, and it was noted that patient's 5FU pump is running. Per chart review has squamous cell cancer , established with [facility] oncology and lately on Doectaxel/Cisplatin/5FU     Recommendations as was discussed with the primary team:   - attempt to communicate/send a message to primary oncologist to update   - hold/clamp the 5fu pump ; Dr. Albrecht will communicate with the RN at S fourth floor to assist with that for now   - in the morning / businss hours and after communicating with patient's primary oncologist , to discuss with the infusion center RN / morning Onc team at [place], to disconnect/empty the pump.   - no inpatient chemo while being admitted / sick and suspected active infection and DKA.     Aaron Boyd DO   Hematology and Medical Oncology - PGY IV  [facility]

## 2024-03-19 NOTE — QUICK NOTE
Spoke over the phone with on-call oncologist who recommends clamping off infusion of chemotherapy at this time given DKA and sepsis.  Will try to get in contact with patient's outpatient oncologist to make them aware of his admission, plan to dispose of remaining chemotherapy is still to be determined at this time.

## 2024-03-19 NOTE — SEPSIS NOTE
"  Sepsis Note   Aman Rocha 54 y.o. male MRN: [id number]  Unit/Bed#: ED-29 Encounter: [id number]       Initial Sepsis Screening       Row Name 03/19/24 1044                Is the patient's history suggestive of a new or worsening infection? Yes (Proceed)  -CR        Suspected source of infection pneumonia;acute abdominal infection  -CR        Indicate SIRS criteria Tachycardia > 90 bpm;Tachypnea > 20 resp per min;Leukocytosis (WBC > 58825 IJL) OR Leukopenia (WBC <4000 IJL) OR Bandemia (WBC >10% bands)  -CR        Are two or more of the above signs & symptoms of infection both present and new to the patient? Yes (Proceed)  -CR        Assess for evidence of organ dysfunction: Are any of the below criteria present within 6 hours of suspected infection and SIRS criteria that are NOT considered to be chronic conditions? Lactate > 2.0  -CR        Date of presentation of severe sepsis 03/19/24  -CR        Time of presentation of severe sepsis 1044  -CR        Sepsis Note: Click \"NEXT\" below (NOT \"close\") to generate sepsis note based on above information. YES (proceed by clicking \"NEXT\")  -CR                  User Key  (r) = Recorded By, (t) = Taken By, (c) = Cosigned By      Initials Name Provider Type    CR Doug Chin DO Resident                        There is no height or weight on file to calculate BMI.  Wt Readings from Last 1 Encounters:   06/14/21 79.4 kg (175 lb)        Patient weight not recorded    "

## 2024-03-19 NOTE — QUICK NOTE
Patient seen and examined again.     Abdomen SNT, bS present   Alert and oriented x 3   No focal deficits.   HS regular - tachycardic in in 90s.       Discussed with nursing need for repeat labs, resident discussed with critical care who are recommending repeat gas. Given that venous blood samples yet to be collected asked respiratory to get ABG in order to expedite results and further management plan.   Also discussed with nursing regarding repeat BMP and insulin gtt to be started depending on repeat blood sugars, which are also pending.

## 2024-03-19 NOTE — QUICK NOTE
Reached out to on-call endocrinologist who recommends treating patient as DKA protocol due to elevated anion gap and elevated beta hydroxybutyrate.  Spoke with critical care about disposition of patient MedSurg versus stepdown 2, based on closure of anion gap on repeat BMP and lactic acidosis on i-STAT, they do not feel patient needs stepdown 2 can most likely remain in MedSurg unit.  Also reached out to the oncology regarding discontinuation of chemotherapy.  They will be reviewing the patient's chart and reaching out afterwards.

## 2024-03-19 NOTE — ASSESSMENT & PLAN NOTE
Lab Results   Component Value Date    HGBA1C 11.0 (H) 06/11/2021       Recent Labs     03/19/24  1540   POCGLU 337*       Blood Sugar Average: Last 72 hrs:  (P) 337    History of Type 2 DM on metformin and Januvia  Patient presents with two days of worsening abdominal pain, nausea, vomiting and diarrhea  Blood sugar >400, BHB elevated, and anion gap of 17 POA.  VBG/ABG shows alkalotic blood pH, possibly from contraction alkalosis in setting of GI losses  Possibly exacerbated by colitis   Admit to step down 2   Insulin drip started, transition to subq insulin once gap closed x2 and blood glucose <200  IVF - normal saline  NPO  Trend BMP q4h, replete electrolytes as needed  Endocrinology consulted, recommendation are appreciated         Jonnathan Hdz MD Awaiting BM from enema and PO challenge. Signed out to Dr. Son Patient had bowel movement. Tolerating PO. Sent nasal saline spray and rectal tylenol to home pharmacy. Will discharge home .  Keara Ashby PGY2 Patient endorsed to me at shift change. 9 yo male with autism, non-verbal with tactile fevers, 3 days of vomiting. Also has history of constipation. decreased po. Given enema here, and had 2 large BM. Tolerating po. On exam abd soft, NT. Will dc home and given strict return precautions.   Leonila Son MD

## 2024-03-19 NOTE — QUICK NOTE
Patient seen and examined by myself complete H&P to follow.   Receiving IVF and antibiotics for colitis in the setting of 5FU pump and nasopharyngeal cancer.     Complaining of nausea and vomiting and diarrhea for the last 2 days.     BMP shows AGMA, and BS are high as well as betahydroxybuturate.     Discussed with ED and they feel less likely DKA given VBG not acidotic, will recheck labs stat.     Physical exam -   Abdomen Soft - non tender   BS present   HS regular   Extremities no edema.   Awake alert and oriented x 3  No neurological deficits.       A/P   AGMA metabolic acidosis ? DKA  Will recheck labs.   IVF and insulin

## 2024-03-19 NOTE — ED PROVIDER NOTES
"History  Chief Complaint   Patient presents with    Weakness - Generalized     Pt presents with weakness x5 days. This am woke up with n/v. Pt being treated for cancer at this time. Per patient he cannot remember what he is being treated for.      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  He describes \"I feel like shit\" but somewhat has difficulty elaborating beyond this.  He endorses substernal chest pain and shortness of breath as well.  No reported fevers, no known sick contacts.  Patient symptoms began 5 days ago with generalized fatigue and today he began with the nausea, vomiting, diarrhea.  Per chart review, patient recently moved from [place] and establish care with [facility] for treatment of his nasopharyngeal cancer.  Patient had 1 chemotherapy treatment in [place] prior to establishing care here, repeat scans did show improvement in the extent of his cancer, a port was placed this past week and he had his second chemotherapy infusion 5 days ago.      History provided by:  Patient and medical records   used: No        Prior to Admission Medications   Prescriptions Last Dose Informant Patient Reported? Taking?   losartan (COZAAR) 25 mg tablet   No No   Sig: Take 1 tablet (25 mg total) by mouth daily   metFORMIN (GLUCOPHAGE) 500 mg tablet   No No   Sig: Take 1 tablet (500 mg total) by mouth 2 (two) times a day with meals      Facility-Administered Medications: None       Past Medical History:   Diagnosis Date    A-fib (HCC)     Diabetes mellitus (HCC)     Hypertension     Stroke (HCC)     x 2 in mid 30's       Past Surgical History:   Procedure Laterality Date    ABDOMINAL SURGERY      APPENDECTOMY      CHOLECYSTECTOMY      LAPAROSCOPIC LYSIS INTESTINAL ADHESIONS      WY LAPS ABD PRTM&OMENTUM DX " W/WO SPEC BR/WA SPX N/A 6/15/2021    Procedure: Diagnostic laparoscopy, lysis of adhesions;  Surgeon: Wilian Juarez MD;  Location: [facility];  Service: Colorectal       History reviewed. No pertinent family history.  I have reviewed and agree with the history as documented.    E-Cigarette/Vaping    E-Cigarette Use Never User      E-Cigarette/Vaping Substances     Social History     Tobacco Use    Smoking status: Every Day     Current packs/day: 0.50     Types: Cigarettes    Smokeless tobacco: Never   Vaping Use    Vaping status: Never Used   Substance Use Topics    Alcohol use: Not Currently    Drug use: Never        Review of Systems   Constitutional:  Positive for fatigue. Negative for chills and fever.   HENT:  Negative for ear pain and sore throat.    Eyes:  Negative for pain and visual disturbance.   Respiratory:  Positive for shortness of breath. Negative for cough.    Cardiovascular:  Positive for chest pain. Negative for palpitations.   Gastrointestinal:  Positive for diarrhea, nausea and vomiting. Negative for abdominal pain.   Genitourinary:  Negative for dysuria and hematuria.   Musculoskeletal:  Positive for myalgias. Negative for arthralgias and back pain.   Skin:  Negative for color change and rash.   Neurological:  Negative for seizures and syncope.   All other systems reviewed and are negative.      Physical Exam  ED Triage Vitals   Temperature Pulse Respirations Blood Pressure SpO2   03/19/24 0826 03/19/24 0826 03/19/24 0826 03/19/24 0826 03/19/24 0826   97.6 °F (36.4 °C) (!) 143 (!) 26 (!) 194/119 99 %      Temp Source Heart Rate Source Patient Position - Orthostatic VS BP Location FiO2 (%)   03/19/24 0826 03/19/24 0826 03/19/24 0826 03/19/24 0826 --   Oral Monitor Sitting Right arm       Pain Score       03/19/24 0951       7             Orthostatic Vital Signs  Vitals:    03/19/24 1125 03/19/24 1200 03/19/24 1230 03/19/24 1400   BP: 164/94 166/81 148/90 129/67   Pulse: (!) 110 104 (!) 112  (!) 108   Patient Position - Orthostatic VS: Lying Lying Lying        Physical Exam  Vitals and nursing note reviewed.   Constitutional:       Appearance: He is ill-appearing.   HENT:      Head: Normocephalic and atraumatic.      Right Ear: External ear normal.      Left Ear: External ear normal.      Mouth/Throat:      Mouth: Mucous membranes are dry.      Pharynx: Oropharynx is clear.   Eyes:      General: No scleral icterus.     Conjunctiva/sclera: Conjunctivae normal.   Cardiovascular:      Rate and Rhythm: Regular rhythm. Tachycardia present.      Pulses: Normal pulses.      Heart sounds: Normal heart sounds.   Pulmonary:      Effort: Pulmonary effort is normal. No respiratory distress.      Breath sounds: Normal breath sounds. No wheezing or rhonchi.   Abdominal:      General: Abdomen is flat. There is no distension.      Tenderness: There is no abdominal tenderness.   Musculoskeletal:         General: No tenderness or signs of injury.      Cervical back: Neck supple. No rigidity.      Right lower leg: No edema.      Left lower leg: No edema.   Skin:     General: Skin is warm.      Coloration: Skin is not jaundiced.      Findings: No erythema or rash.   Neurological:      General: No focal deficit present.      Mental Status: He is alert and oriented to person, place, and time.   Psychiatric:         Mood and Affect: Mood normal.         Behavior: Behavior normal.         ED Medications  Medications   lactated ringers bolus 1,000 mL (0 mL Intravenous Stopped 3/19/24 1055)   ondansetron (ZOFRAN) injection 4 mg (4 mg Intravenous Given 3/19/24 0845)   lactated ringers bolus 1,000 mL (0 mL Intravenous Stopped 3/19/24 1055)   ketorolac (TORADOL) injection 15 mg (15 mg Intravenous Given 3/19/24 0951)   iohexol (OMNIPAQUE) 350 MG/ML injection (MULTI-DOSE) 100 mL (100 mL Intravenous Given 3/19/24 1024)   metroNIDAZOLE (FLAGYL) IVPB (premix) 500 mg 100 mL (0 mg Intravenous Stopped 3/19/24 1231)   ceftriaxone (ROCEPHIN)  1 g/50 mL in dextrose IVPB (0 mg Intravenous Stopped 3/19/24 1143)       Diagnostic Studies  Results Reviewed       Procedure Component Value Units Date/Time    Lactic acid 2 Hours [[id number]]  (Normal) Collected: 03/19/24 1143    Lab Status: Final result Specimen: Blood from Arm, Left Updated: 03/19/24 1228     LACTIC ACID 2.0 mmol/L     Narrative:      Result may be elevated if tourniquet was used during collection.    HS Troponin I 2hr [[id number]]  (Normal) Collected: 03/19/24 1116    Lab Status: Final result Specimen: Blood from Arm, Left Updated: 03/19/24 1152     hs TnI 2hr 3 ng/L      Delta 2hr hsTnI 0 ng/L     RBC Morphology Reflex Test [[id number]] Collected: 03/19/24 0846    Lab Status: Final result Specimen: Blood from Arm, Left Updated: 03/19/24 1101    Beta Hydroxybutyrate [[id number]]  (Abnormal) Collected: 03/19/24 1013    Lab Status: Final result Specimen: Blood from Arm, Left Updated: 03/19/24 1049     Beta- Hydroxybutyrate 2.41 mmol/L     CBC and differential [[id number]]  (Abnormal) Collected: 03/19/24 0846    Lab Status: Final result Specimen: Blood from Arm, Left Updated: 03/19/24 1033     WBC 5.40 Thousand/uL      RBC 5.99 Million/uL      Hemoglobin 18.4 g/dL      Hematocrit 52.8 %      MCV 88 fL      MCH 30.7 pg      MCHC 34.8 g/dL      RDW 13.3 %      MPV 10.6 fL      Platelets 173 Thousands/uL     Narrative:      This is an appended report.  These results have been appended to a previously verified report.    Manual Differential(PHLEBS Do Not Order) [[id number]]  (Abnormal) Collected: 03/19/24 0846    Lab Status: Final result Specimen: Blood from Arm, Left Updated: 03/19/24 1033     Segmented % 68 %      Bands % 11 %      Lymphocytes % 20 %      Monocytes % 0 %      Eosinophils % 1 %      Basophils % 0 %      Absolute Neutrophils 4.27 Thousand/uL      Absolute Lymphocytes 1.08 Thousand/uL      Absolute Monocytes 0.00 Thousand/uL      Absolute Eosinophils 0.05 Thousand/uL      Absolute Basophils  0.00 Thousand/uL      Total Counted --     RBC Morphology Normal     Platelet Estimate Adequate    Blood gas, venous [[id number]]  (Abnormal) Collected: 03/19/24 1013    Lab Status: Final result Specimen: Blood from Arm, Left Updated: 03/19/24 1031     pH, Hector 7.446     pCO2, Hector 35.2 mm Hg      pO2, Hector 57.0 mm Hg      HCO3, Hector 23.7 mmol/L      Base Excess, Hector 0.4 mmol/L      O2 Content, Hector 23.2 ml/dL      O2 HGB, VENOUS 89.0 %     Comprehensive metabolic panel [[id number]]  (Abnormal) Collected: 03/19/24 0846    Lab Status: Final result Specimen: Blood from Arm, Left Updated: 03/19/24 1003     Sodium 132 mmol/L      Potassium 3.8 mmol/L      Chloride 92 mmol/L      CO2 23 mmol/L      ANION GAP 17 mmol/L      BUN 25 mg/dL      Creatinine 0.68 mg/dL      Glucose 409 mg/dL      Calcium 9.7 mg/dL      AST 14 U/L      ALT 32 U/L      Alkaline Phosphatase 93 U/L      Total Protein 7.2 g/dL      Albumin 4.5 g/dL      Total Bilirubin 1.28 mg/dL      eGFR 107 ml/min/1.73sq m     Narrative:      National Kidney Disease Foundation guidelines for Chronic Kidney Disease (CKD):     Stage 1 with normal or high GFR (GFR > 90 mL/min/1.73 square meters)    Stage 2 Mild CKD (GFR = 60-89 mL/min/1.73 square meters)    Stage 3A Moderate CKD (GFR = 45-59 mL/min/1.73 square meters)    Stage 3B Moderate CKD (GFR = 30-44 mL/min/1.73 square meters)    Stage 4 Severe CKD (GFR = 15-29 mL/min/1.73 square meters)    Stage 5 End Stage CKD (GFR <15 mL/min/1.73 square meters)  Note: GFR calculation is accurate only with a steady state creatinine    Lipase [[id number]]  (Abnormal) Collected: 03/19/24 0846    Lab Status: Final result Specimen: Blood from Arm, Left Updated: 03/19/24 1000     Lipase <6 u/L     Magnesium [[id number]]  (Normal) Collected: 03/19/24 0846    Lab Status: Final result Specimen: Blood from Arm, Left Updated: 03/19/24 1000     Magnesium 2.0 mg/dL     Uric acid [[id number]]  (Normal) Collected: 03/19/24 0850    Lab Status: Final  result Specimen: Blood from Arm, Left Updated: 03/19/24 1000     Uric Acid 5.0 mg/dL     Narrative:      N-acetyl-p-benzoquinone imine (metabolite of Acetaminophen) will generate erroneously low results in samples for patients that have taken an overdose of Acetaminophen.    Phosphorus [[id number]]  (Normal) Collected: 03/19/24 0846    Lab Status: Final result Specimen: Blood from Arm, Left Updated: 03/19/24 1000     Phosphorus 3.1 mg/dL     FLU/RSV/COVID - if FLU/RSV clinically relevant [[id number]]  (Normal) Collected: 03/19/24 0848    Lab Status: Final result Specimen: Nares from Nose Updated: 03/19/24 0952     SARS-CoV-2 Negative     INFLUENZA A PCR Negative     INFLUENZA B PCR Negative     RSV PCR Negative    Narrative:      FOR PEDIATRIC PATIENTS - copy/paste COVID Guidelines URL to browser: [email or web address]    SARS-CoV-2 assay is a Nucleic Acid Amplification assay intended for the  qualitative detection of nucleic acid from SARS-CoV-2 in nasopharyngeal  swabs. Results are for the presumptive identification of SARS-CoV-2 RNA.    Positive results are indicative of infection with SARS-CoV-2, the virus  causing COVID-19, but do not rule out bacterial infection or co-infection  with other viruses. Laboratories within the [place] and its  territories are required to report all positive results to the appropriate  public health authorities. Negative results do not preclude SARS-CoV-2  infection and should not be used as the sole basis for treatment or other  patient management decisions. Negative results must be combined with  clinical observations, patient history, and epidemiological information.  This test has not been FDA cleared or approved.    This test has been authorized by FDA under an Emergency Use Authorization  (EUA). This test is only authorized for the duration of time the  declaration that circumstances exist justifying the authorization of  the  emergency use of an in vitro diagnostic tests for detection of SARS-CoV-2  virus and/or diagnosis of COVID-19 infection under section 564(b)(1) of  the Act, 21 U.S.C. 360bbb-3(b)(1), unless the authorization is terminated  or revoked sooner. The test has been validated but independent review by FDA  and CLIA is pending.    Test performed using [employer] GeneXpert: This RT-PCR assay targets N2,  a region unique to SARS-CoV-2. A conserved region in the E-gene was chosen  for pan-Sarbecovirus detection which includes SARS-CoV-2.    According to CMS-2020-01-R, this platform meets the definition of high-throughput technology.    D-Dimer [[id number]]  (Abnormal) Collected: 03/19/24 0846    Lab Status: Final result Specimen: Blood from Arm, Left Updated: 03/19/24 0938     D-Dimer, Quant 0.85 ug/ml FEU     Narrative:      In the evaluation for possible pulmonary embolism, in the appropriate (Well's Score of 4 or less) patient, the age adjusted d-dimer cutoff for this patient can be calculated as:    Age x 0.01 (in ug/mL) for Age-adjusted D-dimer exclusion threshold for a patient over 50 years.    Lactic acid [[id number]]  (Abnormal) Collected: 03/19/24 0846    Lab Status: Final result Specimen: Blood from Arm, Left Updated: 03/19/24 0936     LACTIC ACID 2.7 mmol/L     Narrative:      Result may be elevated if tourniquet was used during collection.    Procalcitonin [[id number]]  (Normal) Collected: 03/19/24 0846    Lab Status: Final result Specimen: Blood from Arm, Left Updated: 03/19/24 0930     Procalcitonin 0.12 ng/ml     HS Troponin 0hr (reflex protocol) [[id number]]  (Normal) Collected: 03/19/24 0846    Lab Status: Final result Specimen: Blood from Arm, Left Updated: 03/19/24 0928     hs TnI 0hr 3 ng/L     B-Type Natriuretic Peptide(BNP) [[id number]]  (Normal) Collected: 03/19/24 0846    Lab Status: Final result Specimen: Blood from Arm, Left Updated: 03/19/24 0926     BNP 32 pg/mL     Protime-INR [[id number]]  (Normal)  Collected: 03/19/24 0846    Lab Status: Final result Specimen: Blood from Arm, Left Updated: 03/19/24 0923     Protime 12.7 seconds      INR 0.89    APTT [[id number]]  (Normal) Collected: 03/19/24 0846    Lab Status: Final result Specimen: Blood from Arm, Left Updated: 03/19/24 0923     PTT 23 seconds     Blood culture #1 [[id number]] Collected: 03/19/24 0846    Lab Status: In process Specimen: Blood from Arm, Left Updated: 03/19/24 0855    Blood culture #2 [[id number]] Collected: 03/19/24 0848    Lab Status: In process Specimen: Blood from Arm, Right Updated: 03/19/24 0855    Clostridium difficile toxin by PCR with EIA [[id number]]     Lab Status: No result Specimen: Stool     Stool Enteric Bacterial Panel by PCR [[id number]]     Lab Status: No result Specimen: Stool     UA w Reflex to Microscopic w Reflex to Culture [[id number]]     Lab Status: No result Specimen: Urine                    PE Study with CT Abdomen and Pelvis with contrast   Final Result by Poli Nguyen MD (03/19 1122)      1. No evidence of pulmonary embolism.   2. Mild variable bronchial wall thickening bilaterally. Correlate for bronchial airways disease. No findings for pulmonary infiltrate.   3. Mild/moderate circumferential wall thickening suggested of the entire esophagus suspicious for esophagitis.   4. Mild variable wall thickening of the colon, most notably of the descending and sigmoid colon, possibly involving the ascending colon. Findings suspicious for colitis.      The study was marked in EPIC for immediate notification.                  Workstation performed: [id number]         XR chest portable   ED Interpretation by Doug Chin DO (03/19 0913)   No acute cardiopulmonary abnormalities visualized      Final Result by Artis Freeman MD (03/19 1303)      No acute cardiopulmonary disease.            Workstation performed: [id number]               Procedures  ECG 12 Lead Documentation Only    Date/Time: 3/19/2024 8:43  "AM    Performed by: Doug Chin DO  Authorized by: Doug Chin DO    Indications / Diagnosis:  Shortness of breath  ECG reviewed by me, the ED Provider: yes    Patient location:  ED  Previous ECG:     Previous ECG:  Compared to current    Similarity:  No change  Interpretation:     Interpretation: normal    Rate:     ECG rate:  133    ECG rate assessment: tachycardic    Rhythm:     Rhythm: sinus rhythm    Ectopy:     Ectopy: none    QRS:     QRS axis:  Normal    QRS intervals:  Normal  Conduction:     Conduction: normal    ST segments:     ST segments:  Normal  T waves:     T waves: normal          ED Course                          Initial Sepsis Screening       Row Name 03/19/24 1044                Is the patient's history suggestive of a new or worsening infection? Yes (Proceed)  -CR        Suspected source of infection pneumonia;acute abdominal infection  -CR        Indicate SIRS criteria Tachycardia > 90 bpm;Tachypnea > 20 resp per min;Leukocytosis (WBC > 41930 IJL) OR Leukopenia (WBC <4000 IJL) OR Bandemia (WBC >10% bands)  -CR        Are two or more of the above signs & symptoms of infection both present and new to the patient? Yes (Proceed)  -CR        Assess for evidence of organ dysfunction: Are any of the below criteria present within 6 hours of suspected infection and SIRS criteria that are NOT considered to be chronic conditions? Lactate > 2.0  -CR        Date of presentation of severe sepsis 03/19/24  -CR        Time of presentation of severe sepsis 1044  -CR        Sepsis Note: Click \"NEXT\" below (NOT \"close\") to generate sepsis note based on above information. YES (proceed by clicking \"NEXT\")  -CR                  User Key  (r) = Recorded By, (t) = Taken By, (c) = Cosigned By      Initials Name Provider Type    CR Doug Chin DO Resident                  Default Flowsheet Data (last 720 hours)       Sepsis Reassess       Row Name 03/19/24 1135                " "   Repeat Volume Status and Tissue Perfusion Assessment Performed    Date of Reassessment: --        Time of Reassessment: 1135  -CR        Sepsis Reassessment Note: Click \"NEXT\" below (NOT \"close\") to generate sepsis reassessment note. YES (proceed by clicking \"NEXT\")  -CR        Repeat Volume Status and Tissue Perfusion Assessment Performed --                  User Key  (r) = Recorded By, (t) = Taken By, (c) = Cosigned By      Initials Name Provider Type    CR Doug Chin DO Resident                      Wells' Criteria for PE      Flowsheet Row Most Recent Value   Wells' Criteria for PE    Clinical signs and symptoms of DVT 0 Filed at: 03/19/2024 0940   PE is primary diagnosis or equally likely 0 Filed at: 03/19/2024 0940   HR >100 1.5 Filed at: 03/19/2024 0940   Immobilization at least 3 days or Surgery in the previous 4 weeks 0 Filed at: 03/19/2024 0940   Previous, objectively diagnosed PE or DVT 0 Filed at: 03/19/2024 0940   Hemoptysis 0 Filed at: 03/19/2024 0940   Malignancy with treatment within 6 months or palliative 1 Filed at: 03/19/2024 0940   Wells' Criteria Total 2.5 Filed at: 03/19/2024 0940              Medical Decision Making  Aman is a 54 year old male with 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  Symptoms started shortly after his second chemotherapy treatment was initiated.  He denies any fevers or known sick contacts.    On arrival, patient was ill-appearing, he was tachycardic with heart rate in the 130s, elevated respiratory rate.  Patient was moaning in the bed and appeared uncomfortable.  Aside from the tachycardia, heart and lung sounds were normal.  No abdominal tenderness to palpation.  Alert and oriented x 3.    Concern for sepsis, possible pneumonia, gastritis, colitis.  Also concern for possible pulmonary " embolism given chest pain and shortness of breath.  Symptoms also may represent side effect from chemotherapy, possible tumor lysis syndrome.    Patient did not meet criteria for severe sepsis with elevated lactic acid, bandemia.  Glucose was found to be elevated, however no evidence of DKA, no acidosis, anion gap elevated likely represents lactic acidosis and dehydration.  Heart rate and lactic acid improved with IV fluid bolus.  During his time in the ED, patient did have a bowel movement that was described as foul-smelling.  Patient was given Flagyl and Rocephin for prophylactic treatment of acute GI infection.    CT PE study with abdomen pelvis did not show any evidence of pulm embolism, questionable esophagitis noted.  There is also evidence of colitis.    Patient to be admitted for further treatment of this severe sepsis and colitis.    Problems Addressed:  Colitis: acute illness or injury  Dehydration: acute illness or injury  Esophagitis: acute illness or injury  Nasopharyngeal cancer (HCC): self-limited or minor problem  Severe sepsis (HCC): acute illness or injury    Amount and/or Complexity of Data Reviewed  Labs: ordered.  Radiology: ordered and independent interpretation performed.    Risk  Prescription drug management.  Decision regarding hospitalization.          Disposition  Final diagnoses:   Severe sepsis (HCC)   Dehydration   Colitis   Esophagitis   Nasopharyngeal cancer (HCC)     Time reflects when diagnosis was documented in both MDM as applicable and the Disposition within this note       Time User Action Codes Description Comment    3/19/2024 11:34 AM Doug Chin Add [A41.9,  R65.20] Severe sepsis (HCC)     3/19/2024 11:34 AM Doug Chin Add [E86.0] Dehydration     3/19/2024 11:34 AM Doug Chin Add [K52.9] Colitis     3/19/2024 11:34 AM Doug Chin Add [K20.90] Esophagitis     3/19/2024 11:35 AM Doug Chin Add [C11.9] Nasopharyngeal cancer (HCC)           ED  Disposition       ED Disposition   Admit    Condition   Stable    Date/Time   Tue Mar 19, 2024 4610    Comment   Case was discussed with Dr. Summers and the patient's admission status was agreed to be Admission Status: inpatient status to the service of Dr. Summers.               Follow-up Information    None         Patient's Medications   Discharge Prescriptions    No medications on file     No discharge procedures on file.    PDMP Review       None             ED Provider  Attending physically available and evaluated Aman Rocha. I managed the patient along with the ED Attending.    Electronically Signed by           Doug Chin DO  03/19/24 4681

## 2024-03-19 NOTE — ED NOTES
Patient assisted to bedside commode at this time. X1 assist for ambulation and transferring.      Zohreh Husain RN  03/19/24 2633

## 2024-03-20 PROBLEM — R19.7 ACUTE DIARRHEA: Status: ACTIVE | Noted: 2024-03-20

## 2024-03-20 PROBLEM — K12.30 MUCOSITIS AFTER THERAPY: Status: ACTIVE | Noted: 2024-03-20

## 2024-03-20 LAB
ANION GAP SERPL CALCULATED.3IONS-SCNC: 10 MMOL/L (ref 4–13)
BASE EXCESS BLDA CALC-SCNC: 2 MMOL/L (ref -2–3)
BUN SERPL-MCNC: 25 MG/DL (ref 5–25)
C COLI+JEJUNI TUF STL QL NAA+PROBE: NEGATIVE
C DIFF TOX GENS STL QL NAA+PROBE: NEGATIVE
CA-I BLD-SCNC: 1.17 MMOL/L (ref 1.12–1.32)
CALCIUM SERPL-MCNC: 8.1 MG/DL (ref 8.4–10.2)
CHLORIDE SERPL-SCNC: 104 MMOL/L (ref 96–108)
CO2 SERPL-SCNC: 23 MMOL/L (ref 21–32)
CREAT SERPL-MCNC: 0.55 MG/DL (ref 0.6–1.3)
CRP SERPL QL: 16.3 MG/L
EC STX1+STX2 GENES STL QL NAA+PROBE: NEGATIVE
ERYTHROCYTE [DISTWIDTH] IN BLOOD BY AUTOMATED COUNT: 13.6 % (ref 11.6–15.1)
ERYTHROCYTE [SEDIMENTATION RATE] IN BLOOD: 4 MM/HOUR (ref 0–19)
EST. AVERAGE GLUCOSE BLD GHB EST-MCNC: 246 MG/DL
FIO2 GAS DIL.REBREATH: 21 L
GFR SERPL CREATININE-BSD FRML MDRD: 117 ML/MIN/1.73SQ M
GLUCOSE SERPL-MCNC: 167 MG/DL (ref 65–140)
GLUCOSE SERPL-MCNC: 222 MG/DL (ref 65–140)
GLUCOSE SERPL-MCNC: 226 MG/DL (ref 65–140)
GLUCOSE SERPL-MCNC: 247 MG/DL (ref 65–140)
GLUCOSE SERPL-MCNC: 255 MG/DL (ref 65–140)
GLUCOSE SERPL-MCNC: 270 MG/DL (ref 65–140)
GLUCOSE SERPL-MCNC: 314 MG/DL (ref 65–140)
HBA1C MFR BLD: 10.2 %
HCO3 BLDA-SCNC: 22.6 MMOL/L (ref 22–28)
HCT VFR BLD AUTO: 45.3 % (ref 36.5–49.3)
HCT VFR BLD CALC: 45 % (ref 36.5–49.3)
HGB BLD-MCNC: 15.6 G/DL (ref 12–17)
HGB BLDA-MCNC: 15.3 G/DL (ref 12–17)
MCH RBC QN AUTO: 31.1 PG (ref 26.8–34.3)
MCHC RBC AUTO-ENTMCNC: 34.4 G/DL (ref 31.4–37.4)
MCV RBC AUTO: 90 FL (ref 82–98)
NRBC BLD AUTO-RTO: 0 /100 WBCS
PCO2 BLD: 23 MMOL/L (ref 21–32)
PCO2 BLD: 24.1 MM HG (ref 36–44)
PH BLD: 7.58 [PH] (ref 7.35–7.45)
PLATELET # BLD AUTO: 130 THOUSANDS/UL (ref 149–390)
PMV BLD AUTO: 10.4 FL (ref 8.9–12.7)
PO2 BLD: 86 MM HG (ref 75–129)
POTASSIUM BLD-SCNC: 3.7 MMOL/L (ref 3.5–5.3)
POTASSIUM SERPL-SCNC: 4 MMOL/L (ref 3.5–5.3)
PROCALCITONIN SERPL-MCNC: 0.11 NG/ML
RBC # BLD AUTO: 5.02 MILLION/UL (ref 3.88–5.62)
SALMONELLA SP SPAO STL QL NAA+PROBE: NEGATIVE
SAO2 % BLD FROM PO2: 98 % (ref 60–85)
SHIGELLA SP+EIEC IPAH STL QL NAA+PROBE: NEGATIVE
SODIUM BLD-SCNC: 133 MMOL/L (ref 136–145)
SODIUM SERPL-SCNC: 137 MMOL/L (ref 135–147)
SPECIMEN SOURCE: ABNORMAL
WBC # BLD AUTO: 1.26 THOUSAND/UL (ref 4.31–10.16)

## 2024-03-20 PROCEDURE — 99233 SBSQ HOSP IP/OBS HIGH 50: CPT | Performed by: INTERNAL MEDICINE

## 2024-03-20 PROCEDURE — 99223 1ST HOSP IP/OBS HIGH 75: CPT | Performed by: INTERNAL MEDICINE

## 2024-03-20 PROCEDURE — 82948 REAGENT STRIP/BLOOD GLUCOSE: CPT

## 2024-03-20 PROCEDURE — 84145 PROCALCITONIN (PCT): CPT

## 2024-03-20 PROCEDURE — 87493 C DIFF AMPLIFIED PROBE: CPT

## 2024-03-20 PROCEDURE — 85027 COMPLETE CBC AUTOMATED: CPT | Performed by: INTERNAL MEDICINE

## 2024-03-20 PROCEDURE — 83036 HEMOGLOBIN GLYCOSYLATED A1C: CPT

## 2024-03-20 PROCEDURE — 86140 C-REACTIVE PROTEIN: CPT

## 2024-03-20 PROCEDURE — C9113 INJ PANTOPRAZOLE SODIUM, VIA: HCPCS

## 2024-03-20 PROCEDURE — 87505 NFCT AGENT DETECTION GI: CPT

## 2024-03-20 PROCEDURE — 80048 BASIC METABOLIC PNL TOTAL CA: CPT | Performed by: INTERNAL MEDICINE

## 2024-03-20 PROCEDURE — 85652 RBC SED RATE AUTOMATED: CPT

## 2024-03-20 RX ORDER — INSULIN LISPRO 100 [IU]/ML
1-6 INJECTION, SOLUTION INTRAVENOUS; SUBCUTANEOUS
Status: DISCONTINUED | OUTPATIENT
Start: 2024-03-20 | End: 2024-03-24

## 2024-03-20 RX ORDER — SUCRALFATE 1 G/1
1 TABLET ORAL EVERY 6 HOURS SCHEDULED
Status: DISCONTINUED | OUTPATIENT
Start: 2024-03-20 | End: 2024-03-24

## 2024-03-20 RX ORDER — PANTOPRAZOLE SODIUM 40 MG/10ML
40 INJECTION, POWDER, LYOPHILIZED, FOR SOLUTION INTRAVENOUS EVERY 12 HOURS SCHEDULED
Status: DISCONTINUED | OUTPATIENT
Start: 2024-03-20 | End: 2024-03-25

## 2024-03-20 RX ORDER — SODIUM CHLORIDE, SODIUM GLUCONATE, SODIUM ACETATE, POTASSIUM CHLORIDE, MAGNESIUM CHLORIDE, SODIUM PHOSPHATE, DIBASIC, AND POTASSIUM PHOSPHATE .53; .5; .37; .037; .03; .012; .00082 G/100ML; G/100ML; G/100ML; G/100ML; G/100ML; G/100ML; G/100ML
100 INJECTION, SOLUTION INTRAVENOUS CONTINUOUS
Status: DISPENSED | OUTPATIENT
Start: 2024-03-20 | End: 2024-03-21

## 2024-03-20 RX ORDER — INSULIN GLARGINE 100 [IU]/ML
15 INJECTION, SOLUTION SUBCUTANEOUS
Status: DISCONTINUED | OUTPATIENT
Start: 2024-03-20 | End: 2024-03-25 | Stop reason: HOSPADM

## 2024-03-20 RX ORDER — SUCRALFATE ORAL 1 G/10ML
1 SUSPENSION ORAL EVERY 6 HOURS SCHEDULED
Status: DISCONTINUED | OUTPATIENT
Start: 2024-03-20 | End: 2024-03-20

## 2024-03-20 RX ADMIN — NYSTATIN 500000 UNITS: 100000 SUSPENSION ORAL at 18:06

## 2024-03-20 RX ADMIN — DEXAMETHASONE 8 MG: 4 TABLET ORAL at 09:16

## 2024-03-20 RX ADMIN — NYSTATIN 500000 UNITS: 100000 SUSPENSION ORAL at 09:16

## 2024-03-20 RX ADMIN — SUCRALFATE 1 G: 1 TABLET ORAL at 12:53

## 2024-03-20 RX ADMIN — SUCRALFATE 1 G: 1 TABLET ORAL at 18:06

## 2024-03-20 RX ADMIN — DULOXETINE HYDROCHLORIDE 30 MG: 30 CAPSULE, DELAYED RELEASE ORAL at 09:17

## 2024-03-20 RX ADMIN — INSULIN LISPRO 3 UNITS: 100 INJECTION, SOLUTION INTRAVENOUS; SUBCUTANEOUS at 18:10

## 2024-03-20 RX ADMIN — METRONIDAZOLE 500 MG: 500 INJECTION, SOLUTION INTRAVENOUS at 12:53

## 2024-03-20 RX ADMIN — SODIUM CHLORIDE, SODIUM GLUCONATE, SODIUM ACETATE, POTASSIUM CHLORIDE, MAGNESIUM CHLORIDE, SODIUM PHOSPHATE, DIBASIC, AND POTASSIUM PHOSPHATE 100 ML/HR: .53; .5; .37; .037; .03; .012; .00082 INJECTION, SOLUTION INTRAVENOUS at 09:14

## 2024-03-20 RX ADMIN — OLANZAPINE 5 MG: 2.5 TABLET, FILM COATED ORAL at 21:02

## 2024-03-20 RX ADMIN — GABAPENTIN 600 MG: 300 CAPSULE ORAL at 21:01

## 2024-03-20 RX ADMIN — AMLODIPINE BESYLATE 5 MG: 5 TABLET ORAL at 09:16

## 2024-03-20 RX ADMIN — GABAPENTIN 600 MG: 300 CAPSULE ORAL at 18:06

## 2024-03-20 RX ADMIN — INSULIN LISPRO 4 UNITS: 100 INJECTION, SOLUTION INTRAVENOUS; SUBCUTANEOUS at 09:20

## 2024-03-20 RX ADMIN — PANTOPRAZOLE SODIUM 40 MG: 40 INJECTION, POWDER, FOR SOLUTION INTRAVENOUS at 09:17

## 2024-03-20 RX ADMIN — INSULIN GLARGINE 15 UNITS: 100 INJECTION, SOLUTION SUBCUTANEOUS at 21:02

## 2024-03-20 RX ADMIN — CARVEDILOL 6.25 MG: 6.25 TABLET, FILM COATED ORAL at 18:06

## 2024-03-20 RX ADMIN — NYSTATIN 500000 UNITS: 100000 SUSPENSION ORAL at 12:53

## 2024-03-20 RX ADMIN — INSULIN LISPRO 2 UNITS: 100 INJECTION, SOLUTION INTRAVENOUS; SUBCUTANEOUS at 09:20

## 2024-03-20 RX ADMIN — METRONIDAZOLE 500 MG: 500 INJECTION, SOLUTION INTRAVENOUS at 19:54

## 2024-03-20 RX ADMIN — PANTOPRAZOLE SODIUM 40 MG: 40 TABLET, DELAYED RELEASE ORAL at 05:03

## 2024-03-20 RX ADMIN — POTASSIUM CHLORIDE 20 MEQ: 14.9 INJECTION, SOLUTION INTRAVENOUS at 02:08

## 2024-03-20 RX ADMIN — SODIUM CHLORIDE, SODIUM GLUCONATE, SODIUM ACETATE, POTASSIUM CHLORIDE, MAGNESIUM CHLORIDE, SODIUM PHOSPHATE, DIBASIC, AND POTASSIUM PHOSPHATE 100 ML/HR: .53; .5; .37; .037; .03; .012; .00082 INJECTION, SOLUTION INTRAVENOUS at 19:54

## 2024-03-20 RX ADMIN — INSULIN LISPRO 4 UNITS: 100 INJECTION, SOLUTION INTRAVENOUS; SUBCUTANEOUS at 12:59

## 2024-03-20 RX ADMIN — METRONIDAZOLE 500 MG: 500 INJECTION, SOLUTION INTRAVENOUS at 04:56

## 2024-03-20 RX ADMIN — PANTOPRAZOLE SODIUM 40 MG: 40 INJECTION, POWDER, FOR SOLUTION INTRAVENOUS at 21:02

## 2024-03-20 RX ADMIN — PIPERACILLIN SODIUM AND TAZOBACTAM SODIUM 4.5 G: 36; 4.5 INJECTION, POWDER, LYOPHILIZED, FOR SOLUTION INTRAVENOUS at 02:07

## 2024-03-20 RX ADMIN — NYSTATIN 500000 UNITS: 100000 SUSPENSION ORAL at 21:02

## 2024-03-20 RX ADMIN — NICOTINE 1 PATCH: 7 PATCH, EXTENDED RELEASE TRANSDERMAL at 09:17

## 2024-03-20 RX ADMIN — PIPERACILLIN SODIUM AND TAZOBACTAM SODIUM 4.5 G: 36; 4.5 INJECTION, POWDER, LYOPHILIZED, FOR SOLUTION INTRAVENOUS at 09:18

## 2024-03-20 RX ADMIN — GABAPENTIN 600 MG: 300 CAPSULE ORAL at 09:16

## 2024-03-20 RX ADMIN — ENOXAPARIN SODIUM 40 MG: 40 INJECTION SUBCUTANEOUS at 09:17

## 2024-03-20 RX ADMIN — INSULIN LISPRO 4 UNITS: 100 INJECTION, SOLUTION INTRAVENOUS; SUBCUTANEOUS at 18:11

## 2024-03-20 RX ADMIN — CARVEDILOL 6.25 MG: 6.25 TABLET, FILM COATED ORAL at 09:16

## 2024-03-20 RX ADMIN — FILGRASTIM-AAFI 300 MCG: 300 INJECTION, SOLUTION SUBCUTANEOUS at 16:31

## 2024-03-20 RX ADMIN — CEFEPIME 2000 MG: 2 INJECTION, POWDER, FOR SOLUTION INTRAVENOUS at 18:05

## 2024-03-20 NOTE — ASSESSMENT & PLAN NOTE
Lab Results   Component Value Date    HGBA1C 11.0 (H) 06/11/2021       Recent Labs     03/19/24  2206 03/20/24  0016 03/20/24  0800 03/20/24  1112   POCGLU 197* 167* 222* 270*         Blood Sugar Average: Last 72 hrs:  (P) [id number]    History of Type 2 DM on metformin and Januvia  Patient presents with two days of worsening abdominal pain, nausea, vomiting and diarrhea  Blood sugar >400, BHB elevated, and anion gap of 17 POA.  VBG/ABG shows alkalotic blood pH, possibly from contraction alkalosis in setting of GI losses  Possibly exacerbated by colitis .  Recent Labs     03/20/24  0016 03/20/24  0800 03/20/24  1112   POCGLU 167* 222* 270*        Plan:  Was on Insulin drip, and transitioned to Sub q Insulin.  IVF - normal saline for 7 hrs. D/C once able to eat.   Diet: Clear liquid diet  Trend BMP q4h, replete electrolytes as needed

## 2024-03-20 NOTE — H&P
[facility]  H&P  Name: Aman Rocha 54 y.o. male I MRN: [id number]  Unit/Bed#: S -01 I Date of Admission: 3/19/2024   Date of Service: 3/19/2024 I Hospital Day: 0      Assessment/Plan   * Sepsis (HCC)  Assessment & Plan  Presents with nasuea vomiting and diarrhea x2 days, Possibly in setting of DKA vs colitis vs chemotherapy induced  Patient initially meets sepsis criteria with tachycardia, tachypnea and source of colits  given IVF boluses and continuous fluid infusions in the ED  Received Rocephin and flagyl in ED, will cover empirically with broad spectrum antibiotics - zosyn and flagyl   Stool and blood cultures collected  Trend CBC and fever curve        DKA (diabetic ketoacidosis) (HCC)  Assessment & Plan  Lab Results   Component Value Date    HGBA1C 11.0 (H) 06/11/2021       Recent Labs     03/19/24  1540   POCGLU 337*       Blood Sugar Average: Last 72 hrs:  (P) 337    History of Type 2 DM on metformin and Januvia  Patient presents with two days of worsening abdominal pain, nausea, vomiting and diarrhea  Blood sugar >400, BHB elevated, and anion gap of 17 POA.  VBG/ABG shows alkalotic blood pH, possibly from contraction alkalosis in setting of GI losses  Possibly exacerbated by colitis   Admit to step down 2   Insulin drip started, transition to subq insulin once gap closed x2 and blood glucose <200  IVF - normal saline  NPO  Trend BMP q4h, replete electrolytes as needed  Endocrinology consulted, recommendation are appreciated          Nasopharyngeal carcinoma (HCC)  Assessment & Plan  Ill-defined mass infiltrating mass involving the right nasopharynx, right skull base, sphenoid sinus, questionably in the right posterior ethmoid air cells, right carotid canal, right cavernous sinus and possibly the right middle cranial fossa seen on CT facial bones in Feb 2024  Initially seen by oncology in [place], [place] prior to moving to [place] to be with family and friends.  Now sees oncology at  [facility] who have resumed TPF regimen.  Started 2nd cycle 5 days ago  Now presents with abdominal pain, nausea, vomiting and diarrhea for 2 days with evidence of colitis on abdominal CT  Oncology consulted who recommends temporary discontinuation of chemotherapy infusion while inpatient in the setting of DKA and sepsis  Patient's [facility] oncologist needs to be contacted and made aware of his admission and to gain further guidance on discharge planning           VTE Pharmacologic Prophylaxis:   High Risk (Score >/= 5) - Pharmacological DVT Prophylaxis Ordered: enoxaparin (Lovenox). Sequential Compression Devices Ordered.  Code Status: Level 1 - Full Code   Discussion with family: Patient declined call to .     Anticipated Length of Stay: Patient will be admitted on an inpatient basis with an anticipated length of stay of greater than 2 midnights secondary to Sepsis, DKA.    Chief Complaint: NVD, abdominal pain    History of Present Illness:  Aman Rocha is a 54 y.o. male with a PMH of nasopharyngeal carcinoma, HTN, paroxysmal Afib, and diabetes who presents with 2 days of worsening abdominal pain, diarrhea, nausea and vomiting.  He has recently moved to [facility] from [place], [place] after receiving diagnosis of cancer to be closer with friends and family.  Patient had been started on one cycle of TPF in [place] before establishing care with [facility] oncology where chemotherapy was resumed. He started his most recent cycle 5 days ago.  2 days ago he began having nonbloody diarrhea, generalized abdominal pain and nausea.  Patient also endorses vomiting and this has prevented him from eating or drinking much.  He says he has also felt feverish as well since his symptoms started.      In the ED patient was found to have elevate blood glucose of 409, elevated BHB and an anion gap of 17.  No leukocytosis was seen on CBC but imaging was suggestive of colitis in the descending and sigmoid colon.  Patient met sepsis criteria  as evidenced by imaging findings, tachycardia and tachypnea. He was given IV antibiotics and fluids in the ED and admitted for DKA and sepsis.      Review of Systems:  Review of Systems   Constitutional:  Positive for chills, fatigue and fever.   HENT:  Negative for ear pain and sore throat.    Eyes:  Negative for pain and visual disturbance.   Respiratory:  Negative for cough and shortness of breath.    Cardiovascular:  Negative for chest pain and palpitations.   Gastrointestinal:  Positive for abdominal pain, diarrhea, nausea and vomiting.   Genitourinary:  Negative for dysuria and hematuria.   Musculoskeletal:  Negative for arthralgias and back pain.   Skin:  Negative for color change and rash.   Neurological:  Negative for seizures and syncope.   All other systems reviewed and are negative.      Past Medical and Surgical History:   Past Medical History:   Diagnosis Date    A-fib (HCC)     Diabetes mellitus (HCC)     Hypertension     Stroke (HCC)     x 2 in mid 30's       Past Surgical History:   Procedure Laterality Date    ABDOMINAL SURGERY      APPENDECTOMY      CHOLECYSTECTOMY      LAPAROSCOPIC LYSIS INTESTINAL ADHESIONS      KS LAPS ABD PRTM&OMENTUM DX W/WO SPEC BR/WA SPX N/A 6/15/2021    Procedure: Diagnostic laparoscopy, lysis of adhesions;  Surgeon: Wilian Juarez MD;  Location: [facility];  Service: Colorectal       Meds/Allergies:  Prior to Admission medications    Medication Sig Start Date End Date Taking? Authorizing Provider   losartan (COZAAR) 25 mg tablet Take 1 tablet (25 mg total) by mouth daily 4/23/21 5/23/21  Shannon D Severino, PA-C   metFORMIN (GLUCOPHAGE) 500 mg tablet Take 1 tablet (500 mg total) by mouth 2 (two) times a day with meals 6/17/21   Kaylyn Reid PA-C     I have reviewed home medications with patient personally.    Allergies: No Known Allergies    Social History:  Marital Status: /Civil Union   Occupation:   Patient Pre-hospital Living Situation:  Home  Patient Pre-hospital Level of Mobility: walks  Patient Pre-hospital Diet Restrictions:   Substance Use History:   Social History     Substance and Sexual Activity   Alcohol Use Not Currently     Social History     Tobacco Use   Smoking Status Every Day    Current packs/day: 0.50    Types: Cigarettes   Smokeless Tobacco Never     Social History     Substance and Sexual Activity   Drug Use Never       Family History:  History reviewed. No pertinent family history.    Physical Exam:     Vitals:   Blood Pressure: (!) 143/104 (03/19/24 2243)  Pulse: (!) 110 (03/19/24 2243)  Temperature: 98.9 °F (37.2 °C) (03/19/24 2243)  Temp Source: Oral (03/19/24 0826)  Respirations: 18 (03/19/24 2042)  SpO2: 95 % (03/19/24 2243)    Physical Exam  Constitutional:       General: He is in acute distress.      Appearance: He is ill-appearing and diaphoretic. He is not toxic-appearing.   HENT:      Head: Normocephalic.      Nose: Nose normal.      Mouth/Throat:      Mouth: Mucous membranes are moist.      Pharynx: Oropharynx is clear. No oropharyngeal exudate.   Eyes:      General: No scleral icterus.     Conjunctiva/sclera: Conjunctivae normal.      Pupils: Pupils are equal, round, and reactive to light.   Cardiovascular:      Rate and Rhythm: Regular rhythm. Tachycardia present.      Pulses: Normal pulses.      Heart sounds: Normal heart sounds.   Pulmonary:      Effort: Pulmonary effort is normal.      Breath sounds: Normal breath sounds.   Abdominal:      General: Abdomen is flat.      Palpations: Abdomen is soft.      Comments: Patient vomited after palpation of abdomen   Musculoskeletal:         General: Normal range of motion.      Cervical back: Normal range of motion.      Right lower leg: No edema.      Left lower leg: No edema.   Skin:     General: Skin is warm.   Neurological:      General: No focal deficit present.      Mental Status: He is alert and oriented to person, place, and time. Mental status is at baseline.    Psychiatric:         Mood and Affect: Mood normal.         Behavior: Behavior normal.          Additional Data:     Lab Results:  Results from last 7 days   Lab Units 03/19/24  0846   WBC Thousand/uL 5.40   HEMOGLOBIN g/dL 18.4*   HEMATOCRIT % 52.8*   PLATELETS Thousands/uL 173   BANDS PCT % 11*   LYMPHO PCT % 20   MONO PCT % 0*   EOS PCT % 1     Results from last 7 days   Lab Units 03/19/24  2113 03/19/24  1741 03/19/24  0846   SODIUM mmol/L 136   < > 132*   POTASSIUM mmol/L 3.0*   < > 3.8   CHLORIDE mmol/L 101   < > 92*   CO2 mmol/L 26   < > 23   BUN mg/dL 24   < > 25   CREATININE mg/dL 0.48*   < > 0.68   ANION GAP mmol/L 9   < > 17*   CALCIUM mg/dL 8.5   < > 9.7   ALBUMIN g/dL  --   --  4.5   TOTAL BILIRUBIN mg/dL  --   --  1.28*   ALK PHOS U/L  --   --  93   ALT U/L  --   --  32   AST U/L  --   --  14   GLUCOSE RANDOM mg/dL 205*   < > 409*    < > = values in this interval not displayed.     Results from last 7 days   Lab Units 03/19/24  0846   INR  0.89     Results from last 7 days   Lab Units 03/19/24  2206 03/19/24  2102 03/19/24  1856 03/19/24  1540   POC GLUCOSE mg/dl 197* 248* 266* 337*         Results from last 7 days   Lab Units 03/19/24  1143 03/19/24  0846   LACTIC ACID mmol/L 2.0 2.7*   PROCALCITONIN ng/ml  --  0.12       Lines/Drains:  Invasive Devices       Central Venous Catheter Line  Duration             Port A Cath 03/13/24 Right Chest 6 days              Peripheral Intravenous Line  Duration             Peripheral IV 03/19/24 Left Antecubital <1 day    Peripheral IV 03/19/24 Right Antecubital <1 day                    Central Line:  Goal for removal:  needed for chemo           Imaging: Reviewed radiology reports from this admission including: chest xray, chest CT scan, and abdominal/pelvic CT  PE Study with CT Abdomen and Pelvis with contrast   Final Result by Poli Nguyen MD (03/19 1122)      1. No evidence of pulmonary embolism.   2. Mild variable bronchial wall thickening bilaterally.  Correlate for bronchial airways disease. No findings for pulmonary infiltrate.   3. Mild/moderate circumferential wall thickening suggested of the entire esophagus suspicious for esophagitis.   4. Mild variable wall thickening of the colon, most notably of the descending and sigmoid colon, possibly involving the ascending colon. Findings suspicious for colitis.      The study was marked in EPIC for immediate notification.                  Workstation performed: [id number]         XR chest portable   ED Interpretation by Doug Chin DO (03/19 0159)   No acute cardiopulmonary abnormalities visualized      Final Result by Artis Freeman MD (03/19 1923)      No acute cardiopulmonary disease.            Workstation performed: [id number]             EKG and Other Studies Reviewed on Admission:   EKG: NSR. .    ** Please Note: This note has been constructed using a voice recognition system. **

## 2024-03-20 NOTE — ASSESSMENT & PLAN NOTE
Presents with nasuea vomiting and diarrhea x2 days, Possibly in setting of DKA vs colitis vs chemotherapy induced  Patient initially meets sepsis criteria with tachycardia, tachypnea and source of colits  given IVF boluses and continuous fluid infusions in the ED  Received Rocephin and flagyl in ED, started him on zosyn and flagyl on 3/19  Recent Labs     03/19/24  0846 03/20/24  0504   WBC 5.40 1.26*     Recent Labs     03/19/24  0846 03/20/24  0504   PROCALCITONI 0.12 0.11   Pulse: 100    Plan:  Source: GI source. GI consulted.   Current antibiotics discontinued Zosyn and started on cefepime and metronidazole.  Discontinued Decadron 8 mg oral  Stool and blood cultures results pending  Trend CBC and fever curve

## 2024-03-20 NOTE — ASSESSMENT & PLAN NOTE
Presents with nasuea vomiting and diarrhea x2 days, Possibly in setting of DKA vs colitis vs chemotherapy induced  Patient initially meets sepsis criteria with tachycardia, tachypnea and source of colits  given IVF boluses and continuous fluid infusions in the ED  Received Rocephin and flagyl in ED, will cover empirically with broad spectrum antibiotics - zosyn and flagyl   Stool and blood cultures collected  Trend CBC and fever curve

## 2024-03-20 NOTE — CONSULTS
Oncology Consult Note  Aman Rocha 54 y.o. 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 Rashid at [facility], today day #5 admitted to the hospital with fatigue, diarrhea, mucositis, pain in the mouth, dysphagia, dizziness    He has CTA of the chest showed no evidence of PE, moderate wall thickening of the esophagus suspicious for esophagitis, thickening of the colon suspicious for colitis and bronchial wall thickening    Glucose 409 creatinine 0.6, BUN 25, hemoglobin 18.4 WBC 5.4 platelets 173,000, lactic acid 2.7, prolactin 0.12    He reported fatigue, diarrhea, unable to stand up, dizziness when he stands up, mouth sores and pain, loss of appetite  Review of Systems - As stated in the HPI otherwise the fourteen point review of systems was negative.    Past Medical History:   Diagnosis Date    A-fib (HCC)     Diabetes mellitus (HCC)     Hypertension     Stroke (HCC)     x 2 in mid 30's       Social  History     Socioeconomic History    Marital status: /Civil Union     Spouse name: None    Number of children: None    Years of education: None    Highest education level: None   Occupational History    None   Tobacco Use    Smoking status: Every Day     Current packs/day: 0.50     Types: Cigarettes    Smokeless tobacco: Never   Vaping Use    Vaping status: Never Used   Substance and Sexual Activity    Alcohol use: Not Currently    Drug use: Never    Sexual activity: None   Other Topics Concern    None   Social History Narrative    None     Social Determinants of Health     Financial Resource Strain: Low Risk  (1/22/2024)    Received from [facility] [place] Social Needs Scre[facility] - Medical Financial Resource Strain     Would you like help with any of the following needs? (Select ALL that apply): I don't want help with any of these   Food Insecurity: No Food Insecurity (1/22/2024)    Received from [facility]eds [facility] - Food Insecurity     Would you like help with any of the following needs? (Select ALL that apply): I don't want help with any of these   Transportation Needs: No Transportation Needs (1/22/2024)    Received from [facility]    [place] [facility] Social Needs Screening - Transportation     Would you like help with any of the following needs? (Select ALL that apply): I don't want help with any of these   Physical Activity: Not on file   Stress: Not on file   Social Connections: Moderately Integrated (1/22/2024)    Received from [facility] Needs Scre[facility] - Social Connection     Would you like help with any of the following needs? (Select ALL that apply): I don't want help with any of these   Intimate Partner Violence: Not At Risk (1/23/2024)    Received from [facility]    [place] IP Psychosocial Abuse     Is there any current sexual, emotional, verbal, or physical abuse?: No   Housing Stability: Low Risk  (1/22/2024)    Received from [facility]    [place] Social Needs Screening - Housing     Would you like help with any of the following needs? (Select ALL that apply): I don't want help with any of these       History reviewed. No pertinent family history.    No Known Allergies      Current Facility-Administered Medications:     albuterol (PROVENTIL HFA,VENTOLIN HFA) inhaler 2 puff, 2 puff, Inhalation, Q4H PRN, Joe Albrecht MD    amLODIPine (NORVASC) tablet 5 mg, 5 mg, Oral, Daily, Joe Albrecht MD, 5 mg at 03/20/24 0916    carvedilol (COREG) tablet 6.25 mg, 6.25 mg, Oral, BID With Meals, Joe Albrecht MD, 6.25 mg at 03/20/24 0916    ceftriaxone (ROCEPHIN) 1 g/50 mL in dextrose IVPB, 1,000 mg, Intravenous, Q24H, Teena Arango MD    DULoxetine (CYMBALTA) delayed release capsule 30 mg, 30 mg, Oral, Daily, Joe Albrecht MD, 30 mg at 03/20/24 0917    enoxaparin (LOVENOX) subcutaneous injection 40 mg, 40 mg, Subcutaneous, Daily, Joe Albrecht MD, 40 mg at 03/20/24 0917    gabapentin (NEURONTIN) capsule 600 mg, 600 mg, Oral, TID, Joe Albrecht MD, 600 mg at 03/20/24 0916    insulin glargine (LANTUS) subcutaneous injection 12 Units 0.12 mL, 12 Units, Subcutaneous, HS, Davin Ferreira DO, 12 Units at 03/19/24 2247    insulin lispro (HumALOG/ADMELOG) 100 units/mL subcutaneous injection 1-6 Units, 1-6 Units, Subcutaneous, TID AC **AND** Fingerstick Glucose (POCT), , , 4x Daily AC and at bedtime, Teena Arango MD    insulin lispro (HumALOG/ADMELOG) 100 units/mL subcutaneous injection 4 Units, 4 Units, Subcutaneous, TID With Meals, Davin Ferreira DO, 4 Units at 03/20/24 0920    metoclopramide (REGLAN) injection 10 mg, 10 mg, Intravenous, Q6H PRN, Davin Ferreira DO    metroNIDAZOLE (FLAGYL) IVPB (premix) 500 mg 100 mL, 500 mg, Intravenous, Q8H, Joe Albrecht MD, Last Rate: 200 mL/hr at 03/20/24 0456, 500 mg at 03/20/24 0456    multi-electrolyte (PLASMALYTE-A/ISOLYTE-S PH 7.4) IV solution, 100 mL/hr, Intravenous,  Continuous, Teena Arango MD, Last Rate: 100 mL/hr at 03/20/24 0914, 100 mL/hr at 03/20/24 0914    nicotine (NICODERM CQ) 7 mg/24hr TD 24 hr patch 1 patch, 1 patch, Transdermal, Daily, Joe Albrecht MD, 1 patch at 03/20/24 0917    nystatin (MYCOSTATIN) oral suspension 500,000 Units, 500,000 Units, Swish & Swallow, 4x Daily, Hermila Soni MD, 500,000 Units at 03/20/24 0916    OLANZapine (ZyPREXA) tablet 5 mg, 5 mg, Oral, HS, Joe Albrecht MD    ondansetron (ZOFRAN) injection 4 mg, 4 mg, Intravenous, Q4H PRN, Joe Albrecht MD, 4 mg at 03/19/24 2150    pantoprazole (PROTONIX) injection 40 mg, 40 mg, Intravenous, Q12H BERNARD, Hermila Soni MD, 40 mg at 03/20/24 0917    sucralfate (CARAFATE) tablet 1 g, 1 g, Oral, Q6H BERNARD, Elizabeth Ross MD    trimethobenzamide (TIGAN) IM injection 200 mg, 200 mg, Intramuscular, Q6H PRN, Davin Ferreira,       /91   Pulse 102   Temp 98.1 °F (36.7 °C) (Oral)   Resp 16   SpO2 97%       General Appearance:    Alert, oriented but critically ill       Eyes:    PERRL   Ears:    Normal external ear canals, both ears   Nose:   Nares normal, septum midline   Throat:   Mucosa moist.  Mucositis in the buccal mucosa, tongue   Neck:   Supple       Lungs:     Clear to auscultation bilaterally but distant   Chest Wall:    No tenderness or deformity    Heart:    Regular rate and rhythm sinus tachycardia       Abdomen:     Soft, non-tender, bowel sounds +, no organomegaly           Extremities:   Extremities no cyanosis or edema       Skin:   no rash or icterus, dry skin   Lymph nodes:   Cervical, supraclavicular, and axillary nodes normal   Neurologic:   CNII-XII intact, normal strength, sensation and reflexes     Throughout               Recent Results (from the past 48 hour(s))   ECG 12 lead    Collection Time: 03/19/24  8:28 AM   Result Value Ref Range    Ventricular Rate 133 BPM    Atrial Rate 133 BPM    KY Interval 128 ms    QRSD Interval 80  ms    QT Interval 310 ms    QTC Interval 461 ms    P Axis 62 degrees    QRS Axis 64 degrees    T Wave Axis 72 degrees   CBC and differential    Collection Time: 03/19/24  8:46 AM   Result Value Ref Range    WBC 5.40 4.31 - 10.16 Thousand/uL    RBC 5.99 (H) 3.88 - 5.62 Million/uL    Hemoglobin 18.4 (H) 12.0 - 17.0 g/dL    Hematocrit 52.8 (H) 36.5 - 49.3 %    MCV 88 82 - 98 fL    MCH 30.7 26.8 - 34.3 pg    MCHC 34.8 31.4 - 37.4 g/dL    RDW 13.3 11.6 - 15.1 %    MPV 10.6 8.9 - 12.7 fL    Platelets 173 149 - 390 Thousands/uL   Comprehensive metabolic panel    Collection Time: 03/19/24  8:46 AM   Result Value Ref Range    Sodium 132 (L) 135 - 147 mmol/L    Potassium 3.8 3.5 - 5.3 mmol/L    Chloride 92 (L) 96 - 108 mmol/L    CO2 23 21 - 32 mmol/L    ANION GAP 17 (H) 4 - 13 mmol/L    BUN 25 5 - 25 mg/dL    Creatinine 0.68 0.60 - 1.30 mg/dL    Glucose 409 (HH) 65 - 140 mg/dL    Calcium 9.7 8.4 - 10.2 mg/dL    AST 14 13 - 39 U/L    ALT 32 7 - 52 U/L    Alkaline Phosphatase 93 34 - 104 U/L    Total Protein 7.2 6.4 - 8.4 g/dL    Albumin 4.5 3.5 - 5.0 g/dL    Total Bilirubin 1.28 (H) 0.20 - 1.00 mg/dL    eGFR 107 ml/min/1.73sq m   Lactic acid    Collection Time: 03/19/24  8:46 AM   Result Value Ref Range    LACTIC ACID 2.7 (HH) 0.5 - 2.0 mmol/L   Procalcitonin    Collection Time: 03/19/24  8:46 AM   Result Value Ref Range    Procalcitonin 0.12 <=0.25 ng/ml   Protime-INR    Collection Time: 03/19/24  8:46 AM   Result Value Ref Range    Protime 12.7 11.6 - 14.5 seconds    INR 0.89 0.84 - 1.19   APTT    Collection Time: 03/19/24  8:46 AM   Result Value Ref Range    PTT 23 23 - 37 seconds   Blood culture #1    Collection Time: 03/19/24  8:46 AM    Specimen: Arm, Left; Blood   Result Value Ref Range    Blood Culture Received in Microbiology Lab. Culture in Progress.    Lipase    Collection Time: 03/19/24  8:46 AM   Result Value Ref Range    Lipase <6 (L) 11 - 82 u/L   Magnesium    Collection Time: 03/19/24  8:46 AM   Result Value  "Ref Range    Magnesium 2.0 1.9 - 2.7 mg/dL   HS Troponin 0hr (reflex protocol)    Collection Time: 03/19/24  8:46 AM   Result Value Ref Range    hs TnI 0hr 3 \"Refer to ACS Flowchart\"- see link ng/L   D-Dimer    Collection Time: 03/19/24  8:46 AM   Result Value Ref Range    D-Dimer, Quant 0.85 (H) <0.50 ug/ml FEU   B-Type Natriuretic Peptide(BNP)    Collection Time: 03/19/24  8:46 AM   Result Value Ref Range    BNP 32 0 - 100 pg/mL   Uric acid    Collection Time: 03/19/24  8:46 AM   Result Value Ref Range    Uric Acid 5.0 3.5 - 8.5 mg/dL   Phosphorus    Collection Time: 03/19/24  8:46 AM   Result Value Ref Range    Phosphorus 3.1 2.7 - 4.5 mg/dL   Manual Differential(PHLEBS Do Not Order)    Collection Time: 03/19/24  8:46 AM   Result Value Ref Range    Segmented % 68 43 - 75 %    Bands % 11 (H) 0 - 8 %    Lymphocytes % 20 14 - 44 %    Monocytes % 0 (L) 4 - 12 %    Eosinophils % 1 0 - 6 %    Basophils % 0 0 - 1 %    Absolute Neutrophils 4.27 1.85 - 7.62 Thousand/uL    Absolute Lymphocytes 1.08 0.60 - 4.47 Thousand/uL    Absolute Monocytes 0.00 0.00 - 1.22 Thousand/uL    Absolute Eosinophils 0.05 0.00 - 0.40 Thousand/uL    Absolute Basophils 0.00 0.00 - 0.10 Thousand/uL    Total Counted      RBC Morphology Normal     Platelet Estimate Adequate Adequate   Blood culture #2    Collection Time: 03/19/24  8:48 AM    Specimen: Arm, Right; Blood   Result Value Ref Range    Blood Culture Received in Microbiology Lab. Culture in Progress.    FLU/RSV/COVID - if FLU/RSV clinically relevant    Collection Time: 03/19/24  8:48 AM    Specimen: Nose; Nares   Result Value Ref Range    SARS-CoV-2 Negative Negative    INFLUENZA A PCR Negative Negative    INFLUENZA B PCR Negative Negative    RSV PCR Negative Negative   Beta Hydroxybutyrate    Collection Time: 03/19/24 10:13 AM   Result Value Ref Range    Beta- Hydroxybutyrate 2.41 (H) 0.02 - 0.27 mmol/L   Blood gas, venous    Collection Time: 03/19/24 10:13 AM   Result Value Ref Range    " "pH, Hector 7.446 (H) 7.300 - 7.400    pCO2, Hector 35.2 (L) 42.0 - 50.0 mm Hg    pO2, Hector 57.0 (H) 35.0 - 45.0 mm Hg    HCO3, Hector 23.7 (L) 24 - 30 mmol/L    Base Excess, Hector 0.4 mmol/L    O2 Content, Hector 23.2 ml/dL    O2 HGB, VENOUS 89.0 (H) 60.0 - 80.0 %   HS Troponin I 2hr    Collection Time: 03/19/24 11:16 AM   Result Value Ref Range    hs TnI 2hr 3 \"Refer to ACS Flowchart\"- see link ng/L    Delta 2hr hsTnI 0 <20 ng/L   Lactic acid 2 Hours    Collection Time: 03/19/24 11:43 AM   Result Value Ref Range    LACTIC ACID 2.0 0.5 - 2.0 mmol/L   Fingerstick Glucose (POCT)    Collection Time: 03/19/24  3:40 PM   Result Value Ref Range    POC Glucose 337 (H) 65 - 140 mg/dl   Basic metabolic panel    Collection Time: 03/19/24  5:41 PM   Result Value Ref Range    Sodium 134 (L) 135 - 147 mmol/L    Potassium 3.9 3.5 - 5.3 mmol/L    Chloride 97 96 - 108 mmol/L    CO2 24 21 - 32 mmol/L    ANION GAP 13 4 - 13 mmol/L    BUN 23 5 - 25 mg/dL    Creatinine 0.52 (L) 0.60 - 1.30 mg/dL    Glucose 306 (H) 65 - 140 mg/dL    Calcium 8.9 8.4 - 10.2 mg/dL    eGFR 120 ml/min/1.73sq m   Magnesium    Collection Time: 03/19/24  5:41 PM   Result Value Ref Range    Magnesium 1.7 (L) 1.9 - 2.7 mg/dL   Fingerstick Glucose (POCT)    Collection Time: 03/19/24  6:56 PM   Result Value Ref Range    POC Glucose 266 (H) 65 - 140 mg/dl   Fingerstick Glucose (POCT)    Collection Time: 03/19/24  9:02 PM   Result Value Ref Range    POC Glucose 248 (H) 65 - 140 mg/dl   Basic metabolic panel    Collection Time: 03/19/24  9:13 PM   Result Value Ref Range    Sodium 136 135 - 147 mmol/L    Potassium 3.0 (L) 3.5 - 5.3 mmol/L    Chloride 101 96 - 108 mmol/L    CO2 26 21 - 32 mmol/L    ANION GAP 9 4 - 13 mmol/L    BUN 24 5 - 25 mg/dL    Creatinine 0.48 (L) 0.60 - 1.30 mg/dL    Glucose 205 (H) 65 - 140 mg/dL    Calcium 8.5 8.4 - 10.2 mg/dL    eGFR 124 ml/min/1.73sq m   Magnesium    Collection Time: 03/19/24  9:13 PM   Result Value Ref Range    Magnesium 3.7 (H) 1.9 - 2.7 " mg/dL   Fingerstick Glucose (POCT)    Collection Time: 03/19/24 10:06 PM   Result Value Ref Range    POC Glucose 197 (H) 65 - 140 mg/dl   Fingerstick Glucose (POCT)    Collection Time: 03/20/24 12:16 AM   Result Value Ref Range    POC Glucose 167 (H) 65 - 140 mg/dl   Basic metabolic panel    Collection Time: 03/20/24  5:04 AM   Result Value Ref Range    Sodium 137 135 - 147 mmol/L    Potassium 4.0 3.5 - 5.3 mmol/L    Chloride 104 96 - 108 mmol/L    CO2 23 21 - 32 mmol/L    ANION GAP 10 4 - 13 mmol/L    BUN 25 5 - 25 mg/dL    Creatinine 0.55 (L) 0.60 - 1.30 mg/dL    Glucose 247 (H) 65 - 140 mg/dL    Calcium 8.1 (L) 8.4 - 10.2 mg/dL    eGFR 117 ml/min/1.73sq m   CBC and differential    Collection Time: 03/20/24  5:04 AM   Result Value Ref Range    WBC 1.26 (L) 4.31 - 10.16 Thousand/uL    RBC 5.02 3.88 - 5.62 Million/uL    Hemoglobin 15.6 12.0 - 17.0 g/dL    Hematocrit 45.3 36.5 - 49.3 %    MCV 90 82 - 98 fL    MCH 31.1 26.8 - 34.3 pg    MCHC 34.4 31.4 - 37.4 g/dL    RDW 13.6 11.6 - 15.1 %    MPV 10.4 8.9 - 12.7 fL    Platelets 130 (L) 149 - 390 Thousands/uL    nRBC 0 /100 WBCs   Procalcitonin    Collection Time: 03/20/24  5:04 AM   Result Value Ref Range    Procalcitonin 0.11 <=0.25 ng/ml   Fingerstick Glucose (POCT)    Collection Time: 03/20/24  8:00 AM   Result Value Ref Range    POC Glucose 222 (H) 65 - 140 mg/dl   Sedimentation rate, automated    Collection Time: 03/20/24  9:38 AM   Result Value Ref Range    Sed Rate 4 0 - 19 mm/hour   C-reactive protein    Collection Time: 03/20/24  9:38 AM   Result Value Ref Range    CRP 16.3 (H) <3.0 mg/L         XR chest portable    Result Date: 3/19/2024  Narrative: XR CHEST PORTABLE INDICATION: Shortness of breath. COMPARISON: Same day CT of the chest FINDINGS: Right chest port catheter with tip overlying the cavoatrial junction. Clear lungs. No pneumothorax or pleural effusion. Normal cardiomediastinal silhouette. Bones are unremarkable for age. Normal upper abdomen.      Impression: No acute cardiopulmonary disease. Workstation performed: [id number]     PE Study with CT Abdomen and Pelvis with contrast    Result Date: 3/19/2024  Narrative: CT PULMONARY ANGIOGRAM OF THE CHEST AND CT ABDOMEN AND PELVIS WITH INTRAVENOUS CONTRAST INDICATION: Chest pain, shortness of breath, active nasopharyngeal cancer on chemotherapy.  Is also had profuse foul-smelling diarrhea today. COMPARISON: CT abdomen pelvis 6/10/2021. TECHNIQUE: CT examination of the chest, abdomen and pelvis was performed. Thin section CT angiographic technique was used in the chest in order to evaluate for pulmonary embolus and coronal 3D MIP postprocessing was performed on the acquisition scanner. Multiplanar 2D reformatted images were created from the source data. This examination, like all CT scans performed in the [facility], was performed utilizing techniques to minimize radiation dose exposure, including the use of iterative reconstruction and automated exposure control. Radiation dose length product (DLP) for this visit: 894 mGy-cm IV Contrast: 100 mL of iohexol (OMNIPAQUE) Enteric Contrast: Not administered. FINDINGS: CHEST PULMONARY ARTERIAL TREE: No pulmonary embolus is seen. LUNGS: No focal infiltrate. No suspicious pulmonary nodules or masses. Tiny calcified granuloma in the left lower lobe. Mild variable bronchial wall thickening bilaterally. PLEURA: Unremarkable. HEART/AORTA: Heart is unremarkable for patient's age. No thoracic aortic aneurysm. MEDIASTINUM AND NAUN: Mild/moderate circumferential wall thickening suggested of the esophagus. CHEST WALL AND LOWER NECK: The right-sided Mediport line tip terminates at the cavoatrial junction. ABDOMEN LIVER/BILIARY TREE: Unremarkable. GALLBLADDER: Post cholecystectomy. SPLEEN: Unremarkable. PANCREAS: Unremarkable. ADRENAL GLANDS: Unremarkable. KIDNEYS/URETERS: No hydronephrosis or urinary tract calculi. Small left renal cyst. Probable small 1 cm  hyperdense cyst at the left kidney midpole anteriorly, stable size. Subcentimeter hypoattenuating renal lesion(s), too small to characterize but statistically likely benign, which do not warrant follow-up (Radiology June 2019). STOMACH AND BOWEL: Variable mild wall thickening suggested of the colon most notably of the descending and sigmoid colon, possibly involving the ascending colon. No obstruction. Scattered colonic diverticulosis. APPENDIX: No findings to suggest appendicitis. ABDOMINOPELVIC CAVITY: No ascites. No pneumoperitoneum. No lymphadenopathy. VESSELS: Atherosclerosis without abdominal aortic aneurysm. PELVIS REPRODUCTIVE ORGANS: Mildly enlarged prostate. URINARY BLADDER: Unremarkable. ABDOMINAL WALL/INGUINAL REGIONS: Unremarkable. BONES: No acute fracture or suspicious osseous lesion. Chronic mild-moderate anterior wedging at the L1 vertebral body.     Impression: 1. No evidence of pulmonary embolism. 2. Mild variable bronchial wall thickening bilaterally. Correlate for bronchial airways disease. No findings for pulmonary infiltrate. 3. Mild/moderate circumferential wall thickening suggested of the entire esophagus suspicious for esophagitis. 4. Mild variable wall thickening of the colon, most notably of the descending and sigmoid colon, possibly involving the ascending colon. Findings suspicious for colitis. The study was marked in EPIC for immediate notification. Workstation performed: [id number]     IR FLUOROSCOPY GUIDED CENTRAL VENOUS ACCESS    Result Date: 3/13/2024  Narrative: Procedure: Port placement. Clinical History: Nasopharyngeal CA. Sedation time (minutes): 15 Versed (mg): 5 Fentanyl (mcg): 100 Mallampati score: II      ASA score: 3 Estimated Blood Loss (mL): < 5 Fluoroscopy time (minutes): 0.1 CAK Dose (mGy): 0.27 Informed consent for the procedure was obtained. The patient was examined and is in satisfactory condition for planned moderate sedation. IV moderate sedation was given for  the procedure, with independent observation by the Radiology Nurse. After the procedure the patient was recovered and returned to their baseline status, and was deemed fit for discharge from . A time-out was performed with all team members present and in agreement, confirming the correct patient, correct procedure, and correct side/site. Documentation of right internal jugular vein patency was performed using gray scale ultrasonography with permanent image documentation. The right neck and upper chest were prepped using ChloraPrep, and allowed to dry before sterile draping applied in the usual sterile fashion. Maximum sterile barrier technique was used, including use of a cap, mask, sterile gown, gloves, and full body drape, after recommended hand hygiene and aseptic techniques. Sterile ultrasound  technique was used, including use of a sterile probe cover and sterile gel. Using lidocaine for local anesthesia as well as direct ultrasound guidance, the right internal jugular vein was punctured with a micropuncture needle. Exchange was made for a peel-away sheath placed in the SVC. Using 1% lidocaine, a subcutaneous tunnel was created to the infraclavicular chest, and a subcutaneous pocket was created. The catheter was attached to the port reservoir, advanced through the tunnel and the BioFlo power injectable port reservoir with ENDEXA and PASV valve technology (angioPrixtelnamGigantt) placed into the pocket. The catheter was trimmed to 21 cm and placed through the peel-away sheath, which was subsequently removed. The catheter tip was fluoroscopically confirmed in good position at the cavoatrial junction,  without catheter kink, with fluoroscopic spot image documentation made. The port reservoir was accessed with a non-coring needle and flushed with saline, and the needle subsequently removed. Port pocket closure was accomplished with subcutaneous 2-0 Vicryl, followed by Exofin. The neck puncture site was closed with Exofin.  The patient tolerated the procedure well.    Impression: Impression: Successful ultrasound and fluoroscopically guided placement of a BioFlo power injectable PASV valved port (angiodynamics) catheter via right internal jugular vein. Workstation:[id number]    IR ULTRASOUND GUIDE VASCULAR ACCESS    Result Date: 3/13/2024  Narrative: Procedure: Port placement. Clinical History: Nasopharyngeal CA. Sedation time (minutes): 15 Versed (mg): 5 Fentanyl (mcg): 100 Mallampati score: II      ASA score: 3 Estimated Blood Loss (mL): < 5 Fluoroscopy time (minutes): 0.1 CAK Dose (mGy): 0.27 Informed consent for the procedure was obtained. The patient was examined and is in satisfactory condition for planned moderate sedation. IV moderate sedation was given for the procedure, with independent observation by the Radiology Nurse. After the procedure the patient was recovered and returned to their baseline status, and was deemed fit for discharge from . A time-out was performed with all team members present and in agreement, confirming the correct patient, correct procedure, and correct side/site. Documentation of right internal jugular vein patency was performed using gray scale ultrasonography with permanent image documentation. The right neck and upper chest were prepped using ChloraPrep, and allowed to dry before sterile draping applied in the usual sterile fashion. Maximum sterile barrier technique was used, including use of a cap, mask, sterile gown, gloves, and full body drape, after recommended hand hygiene and aseptic techniques. Sterile ultrasound  technique was used, including use of a sterile probe cover and sterile gel. Using lidocaine for local anesthesia as well as direct ultrasound guidance, the right internal jugular vein was punctured with a micropuncture needle. Exchange was made for a peel-away sheath placed in the SVC. Using 1% lidocaine, a subcutaneous tunnel was created to the infraclavicular chest, and a  subcutaneous pocket was created. The catheter was attached to the port reservoir, advanced through the tunnel and the BioFlo power injectable port reservoir with ENDEXA and PASV valve technology (angioEnmotusnamNear Infinity) placed into the pocket. The catheter was trimmed to 21 cm and placed through the peel-away sheath, which was subsequently removed. The catheter tip was fluoroscopically confirmed in good position at the cavoatrial junction,  without catheter kink, with fluoroscopic spot image documentation made. The port reservoir was accessed with a non-coring needle and flushed with saline, and the needle subsequently removed. Port pocket closure was accomplished with subcutaneous 2-0 Vicryl, followed by Exofin. The neck puncture site was closed with Exofin. The patient tolerated the procedure well.    Impression: Impression: Successful ultrasound and fluoroscopically guided placement of a BioFlo power injectable PASV valved port (angiodynamNear Infinity) catheter via right internal jugular vein. Workstation:[id number]    MRI ORBIT FACE NECK WWO CONTRAST    Result Date: 3/8/2024  Narrative: History: Nasopharyngeal carcinoma. Technique: MRI of the orbit/face/neck was performed with sagittal and coronal T1, axial T1, axial and coronal T2 fat-sat, axial and coronal fat-suppressed enhanced T1-weighted images. 15 cc Clariscan injected intravenously. Comparison: Prior outside CT of the maxillofacial bones 2/13/2024 prior MRI the brain 12/21/2023. Findings: Evaluation is degraded secondary to susceptibility artifact from fat saturation technique. Partially visualized brain parenchyma: Chronic encephalomalacia/gliosis seen within the left corona radiata with mild ex vacuo dilatation of the left lateral ventricle. Redemonstration of an ill-defined infiltrative lesion centered within the right posterior nasopharynx measuring approximately 4.4 x 3.6 cm (AP by transverse) the craniocaudal extent is significantly degraded due to susceptibility  artifact seen on postcontrast imaging. The lesion extends posteriorly to the right carotid canal, clivus and right petrous apex with minimal dural enhancement seen along the posterior aspect of the clivus. The lesion extends anteriorly to the pterygoid musculature as well possibly extending into the right posterior paranasal passage, the medial extension of the lesion likely involves the floor of both sphenoid sinuses. The lateral extension of the lesion extends along the dural reflections in the right middle cranial fossa. There is abnormal enhancement seen along the anterior and inferior aspect of the right Meckel's cave when compared to the contralateral side. There is asymmetric abnormal enhancement of the right trigeminal nerve V3 segment seen extending to the right foramen ovale when compared to the contralateral side. There is slight asymmetric increased signal seen within the right pterygoid musculature, this finding is nonspecific may be secondary to infiltrative lesion or denervation changes. There is asymmetric enhancement identified in the right sphenopalatine foramen and pterygopalatine fossa when compared to the contralateral side. There is partial opacification of the right mastoid air cells. Mild mucosal thickening of the bilateral paranasal sinuses. The orbits and globes appear within normal limits.    Impression: Impression: 1.  Evaluation is degraded secondary to susceptibility artifact from fat saturation technique. 2.  Redemonstration of an ill-defined infiltrative lesion centered within the right posterior nasopharynx measuring up to 4.4 cm, the craniocaudal extent is significantly degraded due to susceptibility artifact seen on postcontrast fat saturation imaging. The lesion extends posteriorly to the right carotid canal, clivus and right petrous apex with minimal dural enhancement seen along the posterior aspect of the clivus. The lesion extends anteriorly to the pterygoid musculature as well  possibly extending into the right posterior paranasal passage, the medial extension of the lesion likely involves the floor of both sphenoid sinuses. Lateral extension of the lesion extends along the dural reflections in the right middle cranial fossa. There is abnormal enhancement seen along the anterior and inferior aspect of the right Meckel's cave when compared to the contralateral side. There is asymmetric abnormal enhancement of the right trigeminal nerve (V3 segment) seen extending to the right foramen ovale. There is slight asymmetric increased signal seen within the right pterygoid musculature when compared to the contralateral side, this finding is nonspecific may be secondary to infiltrative lesion or denervation changes. Abnormal enhancement seen within the right sphenopalatine foramen and pterygopalatine fossa. On follow-up imaging, please perform on a 1.5 Kenisha MRI and utilize STIR imaging to minimize fat saturation artifact. 3.  Chronic encephalomalacia/gliosis seen within the left corona radiata with mild ex vacuo dilatation of the left lateral ventricle. 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, negative for p16    Being treated with induction TPF chemotherapy cycle number 2-day #5 complicated with severe mucositis with colitis, esophagitis, physical examination showed mucositis in the buccal mucosa bilaterally, the patient is hypotensive despite IV fluid, he has fever and chills rule out sepsis he meet the criteria for sepsis, he needs MICU evaluation he needs antibiotics such as cefepime, Flagyl and ID consult    Discontinue 5-FU pump    He needs to start on filgrastim 300 mcg subcu if ANC below 1000    Vigorous hydration and electrolyte replacement    Magic mouthwash plus oxycodone for severe grade 3 mucositis  induced by 5-FU

## 2024-03-20 NOTE — CONSULTS
Consultation -  [facility]  Aman Rocha 54 y.o. male MRN: [id number]  Unit/Bed#: S -01 Encounter: [id number]        Inpatient consult to gastroenterology  Consult performed by: Adan Fair PA-C  Consult ordered by: Teena Arango MD          Reason for Consult / Principal Problem: Persistent nausea, esophagitis    ASSESSMENT and PLAN:    Principal Problem:    Sepsis (HCC)  Active Problems:    DKA (diabetic ketoacidosis) (HCC)    Nasopharyngeal carcinoma (HCC)  Nausea/Vomiting  Generalized abdominal pain  Acute Diarrhea  Mucositis  Abnormal CT chest/abdomen/pelvis   - thickening esophagus   - thickening colon  - PRN anti-emetics  - agree pantoprazole IV 40mg BID  - agree carafate ACHS slurry   - agree nystatin swish and swallow QID for possible candidal esophagitis with current chemotherapy  -Stool enteric pathogen and C. difficile are negative  -Fecal calprotectin awaiting collection  - currently on Ceftriaxone, Flagyl and Zosyn  - Heme/Onc on board and recommending ID  - to consider endoscopy pending results and patients course  - will discuss case further with my attending.    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   Recently moved to area and established with [facility] and was resuming on chemo 5 days ago.   Only one other chemo done in [place] prior to this most recent one.  Two days ago began with diarrhea (non bloody), generalized abdominal pain and nausea.  His current chemotherapy is TPF (which consists of docetaxel (43% get diarrhea), Cisplatin (10% get diarrhea) and Flourouracil (10% get diarrhea).    Vitals stable with mild tachycardia at 102 and BP  "150/91.  Hg normal at 15.6 and leukopenia with WBC 1.26.   Mild lactic acidosis which resolved 2.7 to 2.0.  Troponin x 2 negative.  SED normal at 4 with mildly elevated CRP at 16.3.   Stool Enteric pathogens and C.diff are negative.   Fecal calprotectin \"needs to be collected\".  CTA chest/ab/pelvis shows no pulmonary embolism, mild bronchial wall thickening, mild/mod thickening of entire esophagus, mild variable wall thickening of colon most notable in left and sigmoid questioning colitis.    Last N/V this AM.  Abdominal pain is lyndsay-umbilical.   Still having loose (non-bloody stools)      Endoscopic History:  EGD - 6/14/21 done for nausea, noted only mild gastritis.  No biopsy taken  Colonoscopy - none previously    REVIEW OF SYSTEMS:    CONSTITUTIONAL: Reports chills, fatigue, fever and poor appetite  HEENT: No earache or tinnitus. Denies hearing loss or visual disturbances.  CARDIOVASCULAR: No chest pain or palpitations.   RESPIRATORY: Denies any cough, hemoptysis, shortness of breath or dyspnea on exertion.  GASTROINTESTINAL: As noted in the History of Present Illness.   GENITOURINARY: No problems with urination. Denies any hematuria or dysuria.  NEUROLOGIC: No dizziness or vertigo, denies headaches.   MUSCULOSKELETAL: Denies any muscle or joint pain.   SKIN: Denies skin rashes or itching.   ENDOCRINE: Denies excessive thirst. Denies intolerance to heat or cold.  PSYCHOSOCIAL: Denies depression or anxiety. Denies any recent memory loss.       Historical Information   Past Medical History:   Diagnosis Date    A-fib (HCC)     Diabetes mellitus (HCC)     Hypertension     Stroke (HCC)     x 2 in mid 30's     Past Surgical History:   Procedure Laterality Date    ABDOMINAL SURGERY      APPENDECTOMY      CHOLECYSTECTOMY      LAPAROSCOPIC LYSIS INTESTINAL ADHESIONS      NJ LAPS ABD PRTM&OMENTUM DX W/WO SPEC BR/WA SPX N/A 6/15/2021    Procedure: Diagnostic laparoscopy, lysis of adhesions;  Surgeon: Wilian Juarez MD; "  Location: [facility];  Service: Colorectal     Social History   Social History     Substance and Sexual Activity   Alcohol Use Not Currently     Social History     Substance and Sexual Activity   Drug Use Never     Social History     Tobacco Use   Smoking Status Every Day    Current packs/day: 0.50    Types: Cigarettes   Smokeless Tobacco Never     History reviewed. No pertinent family history.    Meds/Allergies     Medications Prior to Admission   Medication    losartan (COZAAR) 25 mg tablet    metFORMIN (GLUCOPHAGE) 500 mg tablet     Current Facility-Administered Medications   Medication Dose Route Frequency    albuterol (PROVENTIL HFA,VENTOLIN HFA) inhaler 2 puff  2 puff Inhalation Q4H PRN    amLODIPine (NORVASC) tablet 5 mg  5 mg Oral Daily    carvedilol (COREG) tablet 6.25 mg  6.25 mg Oral BID With Meals    ceftriaxone (ROCEPHIN) 1 g/50 mL in dextrose IVPB  1,000 mg Intravenous Q24H    DULoxetine (CYMBALTA) delayed release capsule 30 mg  30 mg Oral Daily    enoxaparin (LOVENOX) subcutaneous injection 40 mg  40 mg Subcutaneous Daily    gabapentin (NEURONTIN) capsule 600 mg  600 mg Oral TID    insulin glargine (LANTUS) subcutaneous injection 12 Units 0.12 mL  12 Units Subcutaneous HS    insulin lispro (HumALOG/ADMELOG) 100 units/mL subcutaneous injection 1-6 Units  1-6 Units Subcutaneous TID AC    insulin lispro (HumALOG/ADMELOG) 100 units/mL subcutaneous injection 4 Units  4 Units Subcutaneous TID With Meals    metoclopramide (REGLAN) injection 10 mg  10 mg Intravenous Q6H PRN    metroNIDAZOLE (FLAGYL) IVPB (premix) 500 mg 100 mL  500 mg Intravenous Q8H    multi-electrolyte (PLASMALYTE-A/ISOLYTE-S PH 7.4) IV solution  100 mL/hr Intravenous Continuous    nicotine (NICODERM CQ) 7 mg/24hr TD 24 hr patch 1 patch  1 patch Transdermal Daily    nystatin (MYCOSTATIN) oral suspension 500,000 Units  500,000 Units Swish & Swallow 4x Daily    OLANZapine (ZyPREXA) tablet 5 mg  5 mg Oral HS    ondansetron (ZOFRAN) injection  4 mg  4 mg Intravenous Q4H PRN    pantoprazole (PROTONIX) injection 40 mg  40 mg Intravenous Q12H BERNARD    sucralfate (CARAFATE) tablet 1 g  1 g Oral Q6H BERNARD    trimethobenzamide (TIGAN) IM injection 200 mg  200 mg Intramuscular Q6H PRN       No Known Allergies        Objective     Blood pressure 150/91, pulse 102, temperature 98.1 °F (36.7 °C), temperature source Oral, resp. rate 16, SpO2 97%.      Intake/Output Summary (Last 24 hours) at 3/20/2024 1009  Last data filed at 3/20/2024 0449  Gross per 24 hour   Intake --   Output 675 ml   Net -675 ml         PHYSICAL EXAM:      General Appearance:   Mild to moderate distress, alert, cooperative, appears stated age, ill appearing   HEENT:   Severe mucositis, normocephalic, atraumatic, sclera anicteric   Neck:  Supple, symmetrical   Lungs:   Clear to auscultation bilaterally; no rales, rhonchi or wheezing; respirations unlabored    Heart::   Tachy, regular rhythm   Abdomen:   Soft, non-tender, non-distended; normal bowel sounds; no masses, no organomegaly    Genitalia:   Deferred    Rectal:   Deferred    Extremities:  No cyanosis, clubbing or edema    Pulses:  2+ and symmetric all extremities    Skin:  Skin color, texture normal, no rashes or lesions    Lymph nodes:  Not assessed  Neuro: alert and oriented x 3  Psych: normal behavior       Lab Results:   Results from last 7 days   Lab Units 03/20/24  0504 03/19/24  0846   WBC Thousand/uL 1.26* 5.40   HEMOGLOBIN g/dL 15.6 18.4*   HEMATOCRIT % 45.3 52.8*   PLATELETS Thousands/uL 130* 173   LYMPHO PCT %  --  20   MONO PCT %  --  0*   EOS PCT %  --  1     Results from last 7 days   Lab Units 03/20/24  0504 03/19/24  1741 03/19/24  0846   POTASSIUM mmol/L 4.0   < > 3.8   CHLORIDE mmol/L 104   < > 92*   CO2 mmol/L 23   < > 23   BUN mg/dL 25   < > 25   CREATININE mg/dL 0.55*   < > 0.68   CALCIUM mg/dL 8.1*   < > 9.7   ALK PHOS U/L  --   --  93   ALT U/L  --   --  32   AST U/L  --   --  14    < > = values in this interval not  displayed.     Results from last 7 days   Lab Units 03/19/24  0846   INR  0.89     Results from last 7 days   Lab Units 03/19/24  0846   LIPASE u/L <6*       Imaging Studies: I have personally reviewed pertinent imaging studies.    XR chest portable    Result Date: 3/19/2024  Impression: No acute cardiopulmonary disease. Workstation performed: [id number]     PE Study with CT Abdomen and Pelvis with contrast    Result Date: 3/19/2024  Impression: 1. No evidence of pulmonary embolism. 2. Mild variable bronchial wall thickening bilaterally. Correlate for bronchial airways disease. No findings for pulmonary infiltrate. 3. Mild/moderate circumferential wall thickening suggested of the entire esophagus suspicious for esophagitis. 4. Mild variable wall thickening of the colon, most notably of the descending and sigmoid colon, possibly involving the ascending colon. Findings suspicious for colitis. The study was marked in EPIC for immediate notification. Workstation performed: [id number]           Patient was seen and examined by Dr. Girard. All tubbs medical decisions were made by Dr. Girard. Thank you for allowing us to participate in the care of this present patient. We will follow-up with you closely.      Adan Fair PA-C  Gastroenterology

## 2024-03-20 NOTE — PROGRESS NOTES
[facility]  Progress Note  Name: Aman Rocha I  MRN: [id number]  Unit/Bed#: S -01 I Date of Admission: 3/19/2024   Date of Service: 3/20/2024 I Hospital Day: 1    Assessment/Plan   * Sepsis (HCC)  Assessment & Plan  Presents with nasuea vomiting and diarrhea x2 days, Possibly in setting of DKA vs colitis vs chemotherapy induced  Patient initially meets sepsis criteria with tachycardia, tachypnea and source of colits  given IVF boluses and continuous fluid infusions in the ED  Received Rocephin and flagyl in ED, started him on zosyn and flagyl on 3/19  Recent Labs     03/19/24  0846 03/20/24  0504   WBC 5.40 1.26*     Recent Labs     03/19/24  0846 03/20/24  0504   PROCALCITONI 0.12 0.11   Pulse: 100    Plan:  Source: GI source. GI consulted.   Current antibiotics discontinued Zosyn and started on cefepime and metronidazole.  Discontinued Decadron 8 mg oral  Stool and blood cultures results pending  Trend CBC and fever curve        Nasopharyngeal carcinoma (HCC)  Assessment & Plan  Ill-defined mass infiltrating mass involving the right nasopharynx, right skull base, sphenoid sinus, questionably in the right posterior ethmoid air cells, right carotid canal, right cavernous sinus and possibly the right middle cranial fossa seen on CT facial bones in Feb 2024  Initially seen by oncology in [place], [place] prior to moving to [place] to be with family and friends.  Now sees oncology at [facility] who have resumed TPF regimen.  Started 2nd cycle 5 days ago  Now presents with abdominal pain, nausea, vomiting and diarrhea for 2 days with evidence of colitis on abdominal CT  Oncology consulted who recommends temporary discontinuation of chemotherapy infusion while inpatient in the setting of DKA and sepsis  Patient's [facility] oncologist needs to be contacted and made aware of his admission and to gain further guidance on discharge planning.    Plan:  Monitor for worsening symptoms. Today He reported fatigue,  diarrhea, unable to stand up, dizziness when he stands up, mouth sores and pain, loss of appetite    Acute diarrhea  Assessment & Plan  Acute Diarrhea  Abnormal CT chest/abdomen/pelvis: thickening esophagus and thickening colon suspicious for esophagitis and colitis  ESR: Normal  C-reactive protein: 16.3    Plan:  - PRN anti-emetics  - pantoprazole IV 40mg BID  - carafate ACHS slurry   - nystatin swish and swallow QID for possible candidal esophagitis with current chemotherapy  -stool enteric pathogens, C.diff and calprotectin  - consider endoscopy pending results and patients course  -GI following     Mucositis after therapy  Assessment & Plan   He received cycle #2 under the supervision of Dr. Rashid at [facility], today day #5 admitted to the hospital with fatigue, diarrhea, mucositis, pain in the mouth, dysphagia, dizziness .    Today He reported fatigue, diarrhea, unable to stand up, dizziness when he stands up, mouth sores and pain, loss of appetite.  Monitor for worsening symptoms and unstable vitals.     Hyperglycemia  Assessment & Plan  Lab Results   Component Value Date    HGBA1C 11.0 (H) 06/11/2021       Recent Labs     03/19/24  2206 03/20/24  0016 03/20/24  0800 03/20/24  1112   POCGLU 197* 167* 222* 270*         Blood Sugar Average: Last 72 hrs:  (P) 243.1785353710284653    History of Type 2 DM on metformin and Januvia  Patient presents with two days of worsening abdominal pain, nausea, vomiting and diarrhea  Blood sugar >400, BHB elevated, and anion gap of 17 POA.  VBG/ABG shows alkalotic blood pH, possibly from contraction alkalosis in setting of GI losses  Possibly exacerbated by colitis .  Recent Labs     03/20/24  0016 03/20/24  0800 03/20/24  1112   POCGLU 167* 222* 270*        Plan:  Was on Insulin drip, and transitioned to Sub q Insulin.  IVF - normal saline for 7 hrs. D/C once able to eat.   Diet: Clear liquid diet  Trend BMP q4h, replete electrolytes as needed                   VTE Pharmacologic  Prophylaxis:   High Risk (Score >/= 5) - Pharmacological DVT Prophylaxis Ordered: enoxaparin (Lovenox). Sequential Compression Devices Ordered.    Mobility:   Basic Mobility Inpatient Raw Score: 22  JH-HLM Goal: 7: Walk 25 feet or more  JH-HLM Achieved: 7: Walk 25 feet or more  JH-HLM Goal achieved. Continue to encourage appropriate mobility.    Patient Centered Rounds: I performed bedside rounds with nursing staff today.  Discussions with Specialists or Other Care Team Provider: GI     Education and Discussions with Family / Patient: Attempted to update  (Contact number was wrong.) via phone. Unable to contact.    Current Length of Stay: 1 day(s)  Current Patient Status: Inpatient   Discharge Plan: Anticipate discharge in 48-72 hrs to home.    Code Status: Level 1 - Full Code    Subjective:   Pt appears sick. C/o pain abdomen    Objective:     Vitals:   Temp (24hrs), Av.5 °F (36.9 °C), Min:97.8 °F (36.6 °C), Max:98.9 °F (37.2 °C)    Temp:  [97.8 °F (36.6 °C)-98.9 °F (37.2 °C)] 98.9 °F (37.2 °C)  HR:  [100-121] 100  Resp:  [16-22] 16  BP: (129-150)/() 143/83  SpO2:  [94 %-98 %] 96 %  There is no height or weight on file to calculate BMI.     Input and Output Summary (last 24 hours):     Intake/Output Summary (Last 24 hours) at 3/20/2024 1222  Last data filed at 3/20/2024 0449  Gross per 24 hour   Intake --   Output 675 ml   Net -675 ml       Physical Exam:   Physical Exam  Constitutional:       General: He is in acute distress.      Appearance: He is ill-appearing and diaphoretic. He is not toxic-appearing.   HENT:      Head: Normocephalic.      Nose: Nose normal.      Mouth/Throat:      Mouth: Mucous membranes are moist.      Pharynx: Oropharynx is clear. No oropharyngeal exudate.      Comments: Erythematous tongue. - mucositis  Eyes:      General: No scleral icterus.     Conjunctiva/sclera: Conjunctivae normal.      Pupils: Pupils are equal, round, and reactive to light.   Cardiovascular:       Rate and Rhythm: Regular rhythm. Tachycardia present.      Pulses: Normal pulses.      Heart sounds: Normal heart sounds.   Pulmonary:      Effort: Pulmonary effort is normal.      Breath sounds: Normal breath sounds.   Abdominal:      General: Abdomen is flat.      Palpations: Abdomen is soft.      Comments: Patient vomited after palpation of abdomen   Musculoskeletal:         General: Normal range of motion.      Cervical back: Normal range of motion.      Right lower leg: No edema.      Left lower leg: No edema.   Skin:     General: Skin is warm.   Neurological:      General: No focal deficit present.      Mental Status: He is alert and oriented to person, place, and time. Mental status is at baseline.   Psychiatric:         Mood and Affect: Mood normal.         Behavior: Behavior normal.          Additional Data:     Labs:  Results from last 7 days   Lab Units 03/20/24  0504 03/19/24 1739 03/19/24  0846   WBC Thousand/uL 1.26*  --  5.40   HEMOGLOBIN g/dL 15.6  --  18.4*   I STAT HEMOGLOBIN   --    < >  --    HEMATOCRIT % 45.3  --  52.8*   HEMATOCRIT, ISTAT   --    < >  --    PLATELETS Thousands/uL 130*  --  173   BANDS PCT %  --   --  11*   LYMPHO PCT %  --   --  20   MONO PCT %  --   --  0*   EOS PCT %  --   --  1    < > = values in this interval not displayed.     Results from last 7 days   Lab Units 03/20/24  0504 03/19/24 1739 03/19/24  0846   SODIUM mmol/L 137   < > 132*   POTASSIUM mmol/L 4.0   < > 3.8   CHLORIDE mmol/L 104   < > 92*   CO2 mmol/L 23   < > 23   CO2, I-STAT   --    < >  --    BUN mg/dL 25   < > 25   CREATININE mg/dL 0.55*   < > 0.68   ANION GAP mmol/L 10   < > 17*   CALCIUM mg/dL 8.1*   < > 9.7   ALBUMIN g/dL  --   --  4.5   TOTAL BILIRUBIN mg/dL  --   --  1.28*   ALK PHOS U/L  --   --  93   ALT U/L  --   --  32   AST U/L  --   --  14   GLUCOSE RANDOM mg/dL 247*   < > 409*    < > = values in this interval not displayed.     Results from last 7 days   Lab Units 03/19/24  0846   INR   0.89     Results from last 7 days   Lab Units 03/20/24  1112 03/20/24  0800 03/20/24  0016 03/19/24  2206 03/19/24  2102 03/19/24  1856 03/19/24  1540   POC GLUCOSE mg/dl 270* 222* 167* 197* 248* 266* 337*         Results from last 7 days   Lab Units 03/20/24  0504 03/19/24  1143 03/19/24  0846   LACTIC ACID mmol/L  --  2.0 2.7*   PROCALCITONIN ng/ml 0.11  --  0.12       Lines/Drains:  Invasive Devices       Central Venous Catheter Line  Duration             Port A Cath 03/14/24 Right Chest 6 days              Peripheral Intravenous Line  Duration             Peripheral IV 03/19/24 Left Antecubital 1 day    Peripheral IV 03/19/24 Right Antecubital 1 day                    Central Line:  Goal for removal: Port accessed. Will de-access as appropriate.         Telemetry:  Telemetry Orders (From admission, onward)               24 Hour Telemetry Monitoring  Continuous x 24 Hours (Telem)        Expiring   Question:  Reason for 24 Hour Telemetry  Answer:  Metabolic/electrolyte disturbance with high probability of dysrhythmia. K level <3 or >6 OR KCL infusion >10mEq/hr                     Telemetry Reviewed: Sinus Tachycardia  Indication for Continued Telemetry Use: No indication for continued use. Will discontinue.              Imaging: Reviewed radiology reports from this admission including: abdominal/pelvic CT    Recent Cultures (last 7 days):   Results from last 7 days   Lab Units 03/19/24  0848 03/19/24  0846   BLOOD CULTURE  Received in Microbiology Lab. Culture in Progress. Received in Microbiology Lab. Culture in Progress.       Last 24 Hours Medication List:   Current Facility-Administered Medications   Medication Dose Route Frequency Provider Last Rate    albuterol  2 puff Inhalation Q4H PRN Joe Albrecht MD      amLODIPine  5 mg Oral Daily Joe Albrecht MD      carvedilol  6.25 mg Oral BID With Meals Joe Albrecht MD      cefepime  2,000 mg Intravenous Q12H Teena Arango MD      DULoxetine  30 mg Oral  Daily Joe Albrecht MD      enoxaparin  40 mg Subcutaneous Daily Joe Albrecht MD      gabapentin  600 mg Oral TID Joe Albrecht MD      insulin glargine  12 Units Subcutaneous HS Davin Ferreira DO      insulin lispro  1-6 Units Subcutaneous TID AC Teena Arango MD      insulin lispro  4 Units Subcutaneous TID With Meals Davin Ferreira DO      metoclopramide  10 mg Intravenous Q6H PRN Davin Ferreira DO      metroNIDAZOLE  500 mg Intravenous Q8H Joe Albrecht  mg (03/20/24 0456)    multi-electrolyte  100 mL/hr Intravenous Continuous Teena Arango  mL/hr (03/20/24 0949)    nicotine  1 patch Transdermal Daily Joe Albrecht MD      nystatin  500,000 Units Swish & Swallow 4x Daily Hermila Soin MD      OLANZapine  5 mg Oral HS Joe Albrecht MD      ondansetron  4 mg Intravenous Q4H PRN Joe Albrecht MD      pantoprazole  40 mg Intravenous Q12H BERNARD Hermila Soni MD      sucralfate  1 g Oral Q6H BERNARD Elizabeth Ross MD      trimethobenzamide  200 mg Intramuscular Q6H PRN Davin Ferreira DO          Today, Patient Was Seen By: Hermila Soni MD    **Please Note: This note may have been constructed using a voice recognition system.**

## 2024-03-20 NOTE — ASSESSMENT & PLAN NOTE
He received cycle #2 under the supervision of Dr. Rashid at [facility], today day #5 admitted to the hospital with fatigue, diarrhea, mucositis, pain in the mouth, dysphagia, dizziness .    Today He reported fatigue, diarrhea, unable to stand up, dizziness when he stands up, mouth sores and pain, loss of appetite.  Monitor for worsening symptoms and unstable vitals.

## 2024-03-20 NOTE — CASE MANAGEMENT
Case Management Assessment & Discharge Planning Note    Patient name Aman Rocha  Location S /S -01 MRN [id number]  : 1970 Date 3/20/2024       Current Admission Date: 3/19/2024  Current Admission Diagnosis:Sepsis (HCC)   Patient Active Problem List    Diagnosis Date Noted    Acute diarrhea 2024    Mucositis after therapy 2024    Hyperglycemia 2024    Sepsis (HCC) 2024    Nasopharyngeal carcinoma (HCC) 2024    Small bowel obstruction (HCC) 06/10/2021    Type 2 diabetes mellitus, without long-term current use of insulin (HCC) 06/10/2021    Paroxysmal atrial fibrillation (HCC) 06/10/2021    History of CVA (cerebrovascular accident) 06/10/2021    Nicotine abuse 06/10/2021    Diabetes mellitus (HCC)     Hypertension       LOS (days): 1  Geometric Mean LOS (GMLOS) (days):   Days to GMLOS:     OBJECTIVE:    Risk of Unplanned Readmission Score: 18.94         Current admission status: Inpatient       Preferred Pharmacy:   [facility]/pharmacy #[id number] - [place] [id number]  Phone: [phone] Fax: [phone]    Primary Care Provider: No primary care provider on file.    Primary Insurance: MEDICARE  Secondary Insurance:     ASSESSMENT:  Active Health Care Proxies    There are no active Health Care Proxies on file.                 Readmission Root Cause  30 Day Readmission: No    Patient Information  Admitted from:: Home  Mental Status: Alert  During Assessment patient was accompanied by: Not accompanied during assessment  Assessment information provided by:: Patient  Primary Caregiver: Self  Support Systems: Self, Son, Family members  County [place] Residence: [place]  [place]  Home entry access options. Select all that apply.: Stairs  Number of steps to enter home.:  (30)  Do the steps have railings?: Yes  Type of Current Residence: [address] [place] [address] home  Upon entering residence, is there a bedroom on  the main floor (no further steps)?: Yes  Upon entering residence, is there a bathroom on the main floor (no further steps)?: Yes  Living Arrangements: Lives w/ Son    Activities of Daily Living Prior to Admission  Functional Status: Independent  Completes ADLs independently?: Yes  Ambulates independently?: Yes  Does patient use assisted devices?: Yes  Assisted Devices (DME) used: Walker  Does patient currently own DME?: Yes  What DME does the patient currently own?: Walker  Does patient have a history of Outpatient Therapy (PT/OT)?: No  Does the patient have a history of Short-Term Rehab?: No  Does patient have a history of HHC?: No  Does patient currently have HHC?: No         Patient Information Continued  Income Source: SSI/SSD  Does patient have prescription coverage?: Yes  Does patient receive dialysis treatments?: No  Does patient have a history of substance abuse?: No  Does patient have a history of Mental Health Diagnosis?: No    PHQ 2/9 Screening   Reviewed PHQ 2/9 Depression Screening Score?: No    Means of Transportation  Means of Transport to Appts:: Family transport      Social Determinants of Health (SDOH)      Flowsheet Row Most Recent Value   Housing Stability    In the last 12 months, was there a time when you were not able to pay the mortgage or rent on time? N   In the last 12 months, how many places have you lived? 1   In the last 12 months, was there a time when you did not have a steady place to sleep or slept in a shelter (including now)? N   Transportation Needs    In the past 12 months, has lack of transportation kept you from medical appointments or from getting medications? no   In the past 12 months, has lack of transportation kept you from meetings, work, or from getting things needed for daily living? No   Food Insecurity    Within the past 12 months, you worried that your food would run out before you got the money to buy more. Never true   Within the past 12 months, the food you  bought just didn't last and you didn't have money to get more. Never true   Utilities    In the past 12 months has the electric, gas, oil, or water company threatened to shut off services in your home? No            DISCHARGE DETAILS:    Discharge planning discussed with:: Patient  Freedom of Choice: Yes  Comments - Freedom of Choice: Return home with sons  CM contacted family/caregiver?: No- see comments (Pt A&O)  Were Treatment Team discharge recommendations reviewed with patient/caregiver?: Yes  Did patient/caregiver verbalize understanding of patient care needs?: Yes  Were patient/caregiver advised of the risks associated with not following Treatment Team discharge recommendations?: Yes    Contacts  Patient Contacts: Aman  Relationship to Patient:: Other (Comment) (Self)  Contact Method: In Person  Reason/Outcome: Continuity of Care, Discharge Planning    Requested Home Health Care         Is the patient interested in HHC at discharge?: No    DME Referral Provided  Referral made for DME?: No    Other Referral/Resources/Interventions Provided:  Interventions: Other (Specify)  Referral Comments: CM spoke with pt at bedside, introduced self and role with discharge planning. Pt reported he lives with his 2 sons in a 2 story home with first floor set up. Pt reported he has about [address] his home. Pt reported he was using a RW. Pt reported he was independent with ADLs and functional mobility. Pt reported his family assists with transportation needs. CM will continue to follow to assist with d/c coordination.    Would you like to participate in our [facility] service program?  : No - Declined    Treatment Team Recommendation: Other (TBD)  Discharge Destination Plan:: Other (TBD)

## 2024-03-20 NOTE — ASSESSMENT & PLAN NOTE
Acute Diarrhea  Abnormal CT chest/abdomen/pelvis: thickening esophagus and thickening colon suspicious for esophagitis and colitis  ESR: Normal  C-reactive protein: 16.3    Plan:  - PRN anti-emetics  - pantoprazole IV 40mg BID  - carafate ACHS slurry   - nystatin swish and swallow QID for possible candidal esophagitis with current chemotherapy  -stool enteric pathogens, C.diff and calprotectin  - consider endoscopy pending results and patients course  -GI following

## 2024-03-20 NOTE — ASSESSMENT & PLAN NOTE
Ill-defined mass infiltrating mass involving the right nasopharynx, right skull base, sphenoid sinus, questionably in the right posterior ethmoid air cells, right carotid canal, right cavernous sinus and possibly the right middle cranial fossa seen on CT facial bones in Feb 2024  Initially seen by oncology in [place], [place] prior to moving to [place] to be with family and friends.  Now sees oncology at [facility] who have resumed TPF regimen.  Started 2nd cycle 5 days ago  Now presents with abdominal pain, nausea, vomiting and diarrhea for 2 days with evidence of colitis on abdominal CT  Oncology consulted who recommends temporary discontinuation of chemotherapy infusion while inpatient in the setting of DKA and sepsis  Patient's [facility] oncologist needs to be contacted and made aware of his admission and to gain further guidance on discharge planning.    Plan:  Monitor for worsening symptoms. Today He reported fatigue, diarrhea, unable to stand up, dizziness when he stands up, mouth sores and pain, loss of appetite

## 2024-03-20 NOTE — ASSESSMENT & PLAN NOTE
Ill-defined mass infiltrating mass involving the right nasopharynx, right skull base, sphenoid sinus, questionably in the right posterior ethmoid air cells, right carotid canal, right cavernous sinus and possibly the right middle cranial fossa seen on CT facial bones in Feb 2024  Initially seen by oncology in [place], [place] prior to moving to [place] to be with family and friends.  Now sees oncology at [facility] who have resumed TPF regimen.  Started 2nd cycle 5 days ago  Now presents with abdominal pain, nausea, vomiting and diarrhea for 2 days with evidence of colitis on abdominal CT  Oncology consulted who recommends temporary discontinuation of chemotherapy infusion while inpatient in the setting of DKA and sepsis  Patient's [facility] oncologist needs to be contacted and made aware of his admission and to gain further guidance on discharge planning

## 2024-03-21 PROBLEM — T45.1X5A CHEMOTHERAPY-INDUCED NEUTROPENIA (HCC): Status: ACTIVE | Noted: 2024-03-21

## 2024-03-21 PROBLEM — D70.1 CHEMOTHERAPY-INDUCED NEUTROPENIA (HCC): Status: ACTIVE | Noted: 2024-03-21

## 2024-03-21 LAB
ANION GAP SERPL CALCULATED.3IONS-SCNC: 6 MMOL/L (ref 4–13)
BASOPHILS # BLD AUTO: 0.02 THOUSANDS/ÂΜL (ref 0–0.1)
BASOPHILS NFR BLD AUTO: 1 % (ref 0–1)
BUN SERPL-MCNC: 21 MG/DL (ref 5–25)
CALCIUM SERPL-MCNC: 7.8 MG/DL (ref 8.4–10.2)
CHLORIDE SERPL-SCNC: 105 MMOL/L (ref 96–108)
CO2 SERPL-SCNC: 26 MMOL/L (ref 21–32)
CREAT SERPL-MCNC: 0.44 MG/DL (ref 0.6–1.3)
EOSINOPHIL # BLD AUTO: 0.03 THOUSAND/ÂΜL (ref 0–0.61)
EOSINOPHIL NFR BLD AUTO: 2 % (ref 0–6)
ERYTHROCYTE [DISTWIDTH] IN BLOOD BY AUTOMATED COUNT: 13.5 % (ref 11.6–15.1)
GFR SERPL CREATININE-BSD FRML MDRD: 128 ML/MIN/1.73SQ M
GLUCOSE SERPL-MCNC: 177 MG/DL (ref 65–140)
GLUCOSE SERPL-MCNC: 180 MG/DL (ref 65–140)
GLUCOSE SERPL-MCNC: 183 MG/DL (ref 65–140)
GLUCOSE SERPL-MCNC: 200 MG/DL (ref 65–140)
GLUCOSE SERPL-MCNC: 201 MG/DL (ref 65–140)
HCT VFR BLD AUTO: 39.8 % (ref 36.5–49.3)
HGB BLD-MCNC: 13.6 G/DL (ref 12–17)
IMM GRANULOCYTES # BLD AUTO: 0.01 THOUSAND/UL (ref 0–0.2)
IMM GRANULOCYTES NFR BLD AUTO: 1 % (ref 0–2)
LYMPHOCYTES # BLD AUTO: 0.88 THOUSANDS/ÂΜL (ref 0.6–4.47)
LYMPHOCYTES NFR BLD AUTO: 55 % (ref 14–44)
MAGNESIUM SERPL-MCNC: 1.8 MG/DL (ref 1.9–2.7)
MCH RBC QN AUTO: 31.2 PG (ref 26.8–34.3)
MCHC RBC AUTO-ENTMCNC: 34.2 G/DL (ref 31.4–37.4)
MCV RBC AUTO: 91 FL (ref 82–98)
MONOCYTES # BLD AUTO: 0.04 THOUSAND/ÂΜL (ref 0.17–1.22)
MONOCYTES NFR BLD AUTO: 3 % (ref 4–12)
NEUTROPHILS # BLD AUTO: 0.59 THOUSANDS/ÂΜL (ref 1.85–7.62)
NEUTS SEG NFR BLD AUTO: 38 % (ref 43–75)
NRBC BLD AUTO-RTO: 0 /100 WBCS
PLATELET # BLD AUTO: 105 THOUSANDS/UL (ref 149–390)
PMV BLD AUTO: 10.1 FL (ref 8.9–12.7)
POTASSIUM SERPL-SCNC: 3.3 MMOL/L (ref 3.5–5.3)
RBC # BLD AUTO: 4.36 MILLION/UL (ref 3.88–5.62)
SODIUM SERPL-SCNC: 137 MMOL/L (ref 135–147)
WBC # BLD AUTO: 1.57 THOUSAND/UL (ref 4.31–10.16)

## 2024-03-21 PROCEDURE — 99232 SBSQ HOSP IP/OBS MODERATE 35: CPT | Performed by: INTERNAL MEDICINE

## 2024-03-21 PROCEDURE — 80048 BASIC METABOLIC PNL TOTAL CA: CPT

## 2024-03-21 PROCEDURE — 85025 COMPLETE CBC W/AUTO DIFF WBC: CPT

## 2024-03-21 PROCEDURE — 82948 REAGENT STRIP/BLOOD GLUCOSE: CPT

## 2024-03-21 PROCEDURE — C9113 INJ PANTOPRAZOLE SODIUM, VIA: HCPCS

## 2024-03-21 PROCEDURE — 83735 ASSAY OF MAGNESIUM: CPT

## 2024-03-21 RX ORDER — POTASSIUM CHLORIDE 14.9 MG/ML
20 INJECTION INTRAVENOUS ONCE
Status: COMPLETED | OUTPATIENT
Start: 2024-03-21 | End: 2024-03-22

## 2024-03-21 RX ORDER — SODIUM CHLORIDE, SODIUM GLUCONATE, SODIUM ACETATE, POTASSIUM CHLORIDE, MAGNESIUM CHLORIDE, SODIUM PHOSPHATE, DIBASIC, AND POTASSIUM PHOSPHATE .53; .5; .37; .037; .03; .012; .00082 G/100ML; G/100ML; G/100ML; G/100ML; G/100ML; G/100ML; G/100ML
75 INJECTION, SOLUTION INTRAVENOUS CONTINUOUS
Status: DISCONTINUED | OUTPATIENT
Start: 2024-03-21 | End: 2024-03-24

## 2024-03-21 RX ORDER — SODIUM CHLORIDE, SODIUM GLUCONATE, SODIUM ACETATE, POTASSIUM CHLORIDE, MAGNESIUM CHLORIDE, SODIUM PHOSPHATE, DIBASIC, AND POTASSIUM PHOSPHATE .53; .5; .37; .037; .03; .012; .00082 G/100ML; G/100ML; G/100ML; G/100ML; G/100ML; G/100ML; G/100ML
125 INJECTION, SOLUTION INTRAVENOUS CONTINUOUS
Status: DISCONTINUED | OUTPATIENT
Start: 2024-03-21 | End: 2024-03-21

## 2024-03-21 RX ORDER — MAGNESIUM SULFATE 1 G/100ML
1 INJECTION INTRAVENOUS ONCE
Status: COMPLETED | OUTPATIENT
Start: 2024-03-21 | End: 2024-03-22

## 2024-03-21 RX ORDER — DIPHENHYDRAMINE HYDROCHLORIDE AND LIDOCAINE HYDROCHLORIDE AND ALUMINUM HYDROXIDE AND MAGNESIUM HYDRO
10 KIT EVERY 4 HOURS
Status: DISPENSED | OUTPATIENT
Start: 2024-03-21 | End: 2024-03-23

## 2024-03-21 RX ADMIN — ONDANSETRON 4 MG: 2 INJECTION INTRAMUSCULAR; INTRAVENOUS at 00:26

## 2024-03-21 RX ADMIN — INSULIN LISPRO 1 UNITS: 100 INJECTION, SOLUTION INTRAVENOUS; SUBCUTANEOUS at 17:24

## 2024-03-21 RX ADMIN — SODIUM CHLORIDE, SODIUM GLUCONATE, SODIUM ACETATE, POTASSIUM CHLORIDE, MAGNESIUM CHLORIDE, SODIUM PHOSPHATE, DIBASIC, AND POTASSIUM PHOSPHATE 125 ML/HR: .53; .5; .37; .037; .03; .012; .00082 INJECTION, SOLUTION INTRAVENOUS at 19:33

## 2024-03-21 RX ADMIN — PANTOPRAZOLE SODIUM 40 MG: 40 INJECTION, POWDER, FOR SOLUTION INTRAVENOUS at 10:00

## 2024-03-21 RX ADMIN — NYSTATIN 500000 UNITS: 100000 SUSPENSION ORAL at 10:01

## 2024-03-21 RX ADMIN — CEFEPIME 2000 MG: 2 INJECTION, POWDER, FOR SOLUTION INTRAVENOUS at 17:16

## 2024-03-21 RX ADMIN — POTASSIUM CHLORIDE 20 MEQ: 14.9 INJECTION, SOLUTION INTRAVENOUS at 11:07

## 2024-03-21 RX ADMIN — INSULIN GLARGINE 15 UNITS: 100 INJECTION, SOLUTION SUBCUTANEOUS at 21:01

## 2024-03-21 RX ADMIN — TOPICAL ANESTHETIC 2 SPRAY: 200 SPRAY DENTAL; PERIODONTAL at 04:43

## 2024-03-21 RX ADMIN — ENOXAPARIN SODIUM 40 MG: 40 INJECTION SUBCUTANEOUS at 10:00

## 2024-03-21 RX ADMIN — METRONIDAZOLE 500 MG: 500 INJECTION, SOLUTION INTRAVENOUS at 04:43

## 2024-03-21 RX ADMIN — SODIUM CHLORIDE, SODIUM GLUCONATE, SODIUM ACETATE, POTASSIUM CHLORIDE, MAGNESIUM CHLORIDE, SODIUM PHOSPHATE, DIBASIC, AND POTASSIUM PHOSPHATE 125 ML/HR: .53; .5; .37; .037; .03; .012; .00082 INJECTION, SOLUTION INTRAVENOUS at 14:57

## 2024-03-21 RX ADMIN — MAGNESIUM SULFATE 1 G: 1 INJECTION INTRAVENOUS at 12:15

## 2024-03-21 RX ADMIN — INSULIN LISPRO 1 UNITS: 100 INJECTION, SOLUTION INTRAVENOUS; SUBCUTANEOUS at 12:50

## 2024-03-21 RX ADMIN — SUCRALFATE 1 G: 1 TABLET ORAL at 00:09

## 2024-03-21 RX ADMIN — CEFEPIME 2000 MG: 2 INJECTION, POWDER, FOR SOLUTION INTRAVENOUS at 05:23

## 2024-03-21 RX ADMIN — NYSTATIN 500000 UNITS: 100000 SUSPENSION ORAL at 12:43

## 2024-03-21 RX ADMIN — INSULIN LISPRO 4 UNITS: 100 INJECTION, SOLUTION INTRAVENOUS; SUBCUTANEOUS at 12:49

## 2024-03-21 RX ADMIN — METRONIDAZOLE 500 MG: 500 INJECTION, SOLUTION INTRAVENOUS at 19:31

## 2024-03-21 RX ADMIN — METRONIDAZOLE 500 MG: 500 INJECTION, SOLUTION INTRAVENOUS at 13:17

## 2024-03-21 RX ADMIN — FILGRASTIM-AAFI 300 MCG: 300 INJECTION, SOLUTION SUBCUTANEOUS at 12:48

## 2024-03-21 RX ADMIN — PANTOPRAZOLE SODIUM 40 MG: 40 INJECTION, POWDER, FOR SOLUTION INTRAVENOUS at 21:00

## 2024-03-21 RX ADMIN — INSULIN LISPRO 4 UNITS: 100 INJECTION, SOLUTION INTRAVENOUS; SUBCUTANEOUS at 17:24

## 2024-03-21 RX ADMIN — SODIUM CHLORIDE, SODIUM GLUCONATE, SODIUM ACETATE, POTASSIUM CHLORIDE, MAGNESIUM CHLORIDE, SODIUM PHOSPHATE, DIBASIC, AND POTASSIUM PHOSPHATE 125 ML/HR: .53; .5; .37; .037; .03; .012; .00082 INJECTION, SOLUTION INTRAVENOUS at 10:19

## 2024-03-21 RX ADMIN — DIPHENHYDRAMINE HYDROCHLORIDE AND LIDOCAINE HYDROCHLORIDE AND ALUMINUM HYDROXIDE AND MAGNESIUM HYDRO 10 ML: KIT at 12:43

## 2024-03-21 NOTE — ASSESSMENT & PLAN NOTE
Lab Results   Component Value Date    HGBA1C 10.2 (H) 03/20/2024       Recent Labs     03/20/24  1629 03/20/24 2121 03/21/24  0735 03/21/24  1120   POCGLU 255* 226* 200* 183*       Blood Sugar Average: Last 72 hrs:  (P) [id number]    History of Type 2 DM on metformin and Januvia  Patient presents with two days of worsening abdominal pain, nausea, vomiting and diarrhea  Blood sugar >400, BHB elevated, and anion gap of 17 POA.  VBG/ABG shows alkalotic blood pH, possibly from contraction alkalosis in setting of GI losses  Possibly exacerbated by colitis .  Recent Labs     03/20/24 2121 03/21/24  0735 03/21/24  1120   POCGLU 226* 200* 183*        Plan:  Was on Insulin drip, and transitioned to Sub q Insulin.  Currently on Glargine 15, SSI and 4 Units mealtime.  IVF - normal saline for 7 hrs. D/C once able to eat.   Diet: Clear liquid diet  Trend BMP q4h, replete electrolytes as needed

## 2024-03-21 NOTE — ASSESSMENT & PLAN NOTE
Acute Diarrhea  Abnormal CT chest/abdomen/pelvis: thickening esophagus and thickening colon suspicious for esophagitis and colitis  ESR: Normal  C-reactive protein: 16.3    Plan:  Last BM 3/19   - PRN anti-emetics  - pantoprazole IV 40mg BID  - carafate ACHS slurry   - nystatin swish and swallow QID and Magic mouth wash  for possible candidal esophagitis with current chemotherapy  -stool enteric pathogens, C.diff negative   Pending calprotectin  - Holding off on EGD today due to absolute neutrophil count of 0.59.  Ideally should be 1.00 or greater  - diet as tolerated for today  -GI following

## 2024-03-21 NOTE — PROGRESS NOTES
[facility]  Progress Note  Name: Aman Rocha I  MRN: [id number]  Unit/Bed#: S -01 I Date of Admission: 3/19/2024   Date of Service: 3/21/2024 I Hospital Day: 2    Assessment/Plan   * Sepsis (HCC)  Assessment & Plan  Presents with nasuea vomiting and diarrhea x2 days, Possibly in setting of DKA vs colitis vs chemotherapy induced  Patient initially meets sepsis criteria with tachycardia, tachypnea and source of colits  given IVF boluses and continuous fluid infusions in the ED  Received Rocephin and flagyl in ED, started him on zosyn and flagyl on 3/19  Recent Labs     03/19/24  0846 03/20/24  0504 03/21/24  0601   WBC 5.40 1.26* 1.57*     Recent Labs     03/19/24  0846 03/20/24  0504   PROCALCITONI 0.12 0.11   Pulse: 88    Plan:  Source: GI source. GI consulted.   Current antibiotics discontinued Zosyn and started on cefepime and metronidazole.  Discontinued Decadron 8 mg oral  Stool and blood cultures results pending  Trend CBC and fever curve        Chemotherapy-induced neutropenia   Assessment & Plan  Lab Results   Component Value Date    NEUTROABS 0.59 (L) 03/21/2024    NEUTROABS 3.46 06/17/2021    NEUTROABS 3.16 06/15/2021     Plan:  Fillgastrim  300 mcg daily  Prophylaxis: Metronidazole  and cefepime       Mucositis after therapy  Assessment & Plan   He received cycle #2 under the supervision of Dr. Rashid at [facility], today day #5 admitted to the hospital with fatigue, diarrhea, mucositis, pain in the mouth, dysphagia, dizziness .    Today He reported fatigue, diarrhea, unable to stand up, dizziness when he stands up, mouth sores and pain, loss of appetite.      Plan:  Magic mouth wash.   Monitor for worsening symptoms and unstable vitals.           Acute diarrhea  Assessment & Plan  Acute Diarrhea  Abnormal CT chest/abdomen/pelvis: thickening esophagus and thickening colon suspicious for esophagitis and colitis  ESR: Normal  C-reactive protein: 16.3    Plan:  Last BM 3/19   - PRN  anti-emetics  - pantoprazole IV 40mg BID  - carafate ACHS slurry   - nystatin swish and swallow QID and Magic mouth wash  for possible candidal esophagitis with current chemotherapy  -stool enteric pathogens, C.diff negative   Pending calprotectin  - Holding off on EGD today due to absolute neutrophil count of 0.59.  Ideally should be 1.00 or greater  - diet as tolerated for today  -GI following     Hyperglycemia  Assessment & Plan  Lab Results   Component Value Date    HGBA1C 10.2 (H) 03/20/2024       Recent Labs     03/20/24  1629 03/20/24 2121 03/21/24  0735 03/21/24  1120   POCGLU 255* 226* 200* 183*       Blood Sugar Average: Last 72 hrs:  (P) [id number]    History of Type 2 DM on metformin and Januvia  Patient presents with two days of worsening abdominal pain, nausea, vomiting and diarrhea  Blood sugar >400, BHB elevated, and anion gap of 17 POA.  VBG/ABG shows alkalotic blood pH, possibly from contraction alkalosis in setting of GI losses  Possibly exacerbated by colitis .  Recent Labs     03/20/24 2121 03/21/24  0735 03/21/24  1120   POCGLU 226* 200* 183*        Plan:  Was on Insulin drip, and transitioned to Sub q Insulin.  Currently on Glargine 15, SSI and 4 Units mealtime.  IVF - normal saline for 7 hrs. D/C once able to eat.   Diet: Clear liquid diet  Trend BMP q4h, replete electrolytes as needed                     VTE Pharmacologic Prophylaxis:   High Risk (Score >/= 5) - Pharmacological DVT Prophylaxis Ordered: enoxaparin (Lovenox). Sequential Compression Devices Ordered.    Mobility:   Basic Mobility Inpatient Raw Score: 22  JH-HLM Goal: 7: Walk 25 feet or more  JH-HLM Achieved: 6: Walk 10 steps or more  JH-HLM Goal achieved. Continue to encourage appropriate mobility.    Patient Centered Rounds: I performed bedside rounds with nursing staff today.  Discussions with Specialists or Other Care Team Provider: GI     Education and Discussions with Family / Patient:  wrong contact number .      Current Length of Stay: 2 day(s)  Current Patient Status: Inpatient   Discharge Plan: Anticipate discharge in 48-72 hrs to home.    Code Status: Level 1 - Full Code    Subjective:   Appears sick. C/o throat pain.     Objective:     Vitals:   Temp (24hrs), Av.4 °F (36.9 °C), Min:97.8 °F (36.6 °C), Max:98.8 °F (37.1 °C)    Temp:  [97.8 °F (36.6 °C)-98.8 °F (37.1 °C)] 97.8 °F (36.6 °C)  HR:  [] 88  Resp:  [18] 18  BP: (103-151)/(69-91) 135/75  SpO2:  [95 %-97 %] 95 %  Body mass index is 20.37 kg/m².     Input and Output Summary (last 24 hours):   No intake or output data in the 24 hours ending 24 1358    Physical Exam:   Physical Exam  Constitutional:       General: He is in acute distress.      Appearance: He is ill-appearing and diaphoretic. He is not toxic-appearing.   HENT:      Head: Normocephalic.      Nose: Nose normal.      Mouth/Throat:      Mouth: Mucous membranes are moist.      Pharynx: Oropharynx is clear. No oropharyngeal exudate.      Comments: Erythematous tongue. - mucositis  Eyes:      General: No scleral icterus.     Conjunctiva/sclera: Conjunctivae normal.      Pupils: Pupils are equal, round, and reactive to light.   Cardiovascular:      Rate and Rhythm: Regular rhythm. Tachycardia present.      Pulses: Normal pulses.      Heart sounds: Normal heart sounds.   Pulmonary:      Effort: Pulmonary effort is normal.      Breath sounds: Normal breath sounds.   Abdominal:      General: Abdomen is flat.      Palpations: Abdomen is soft.      Comments: Patient vomited after palpation of abdomen   Musculoskeletal:         General: Normal range of motion.      Cervical back: Normal range of motion.      Right lower leg: No edema.      Left lower leg: No edema.   Skin:     General: Skin is warm.   Neurological:      General: No focal deficit present.      Mental Status: He is alert and oriented to person, place, and time. Mental status is at baseline.   Psychiatric:         Mood and Affect:  Mood normal.         Behavior: Behavior normal.          Additional Data:     Labs:  Results from last 7 days   Lab Units 03/21/24  0601 03/19/24  1739 03/19/24  0846   WBC Thousand/uL 1.57*   < > 5.40   HEMOGLOBIN g/dL 13.6   < > 18.4*   I STAT HEMOGLOBIN   --    < >  --    HEMATOCRIT % 39.8   < > 52.8*   HEMATOCRIT, ISTAT   --    < >  --    PLATELETS Thousands/uL 105*   < > 173   BANDS PCT %  --   --  11*   NEUTROS PCT % 38*  --   --    LYMPHS PCT % 55*  --   --    LYMPHO PCT %  --   --  20   MONOS PCT % 3*  --   --    MONO PCT %  --   --  0*   EOS PCT % 2  --  1    < > = values in this interval not displayed.     Results from last 7 days   Lab Units 03/21/24  0601 03/19/24  1739 03/19/24  0846   SODIUM mmol/L 137   < > 132*   POTASSIUM mmol/L 3.3*   < > 3.8   CHLORIDE mmol/L 105   < > 92*   CO2 mmol/L 26   < > 23   CO2, I-STAT   --    < >  --    BUN mg/dL 21   < > 25   CREATININE mg/dL 0.44*   < > 0.68   ANION GAP mmol/L 6   < > 17*   CALCIUM mg/dL 7.8*   < > 9.7   ALBUMIN g/dL  --   --  4.5   TOTAL BILIRUBIN mg/dL  --   --  1.28*   ALK PHOS U/L  --   --  93   ALT U/L  --   --  32   AST U/L  --   --  14   GLUCOSE RANDOM mg/dL 201*   < > 409*    < > = values in this interval not displayed.     Results from last 7 days   Lab Units 03/19/24  0846   INR  0.89     Results from last 7 days   Lab Units 03/21/24  1120 03/21/24  0735 03/20/24  2121 03/20/24  1629 03/20/24  1112 03/20/24  0800 03/20/24  0016 03/19/24  2206 03/19/24  2102 03/19/24  1856 03/19/24  1540   POC GLUCOSE mg/dl 183* 200* 226* 255* 270* 222* 167* 197* 248* 266* 337*     Results from last 7 days   Lab Units 03/20/24  0504   HEMOGLOBIN A1C % 10.2*     Results from last 7 days   Lab Units 03/20/24  0504 03/19/24  1143 03/19/24  0846   LACTIC ACID mmol/L  --  2.0 2.7*   PROCALCITONIN ng/ml 0.11  --  0.12       Lines/Drains:  Invasive Devices       Central Venous Catheter Line  Duration             Port A Cath 03/14/24 Right Chest 7 days               Peripheral Intravenous Line  Duration             Peripheral IV 03/19/24 Left Antecubital 2 days    Peripheral IV 03/19/24 Right Antecubital 2 days                    Central Line:  Goal for removal: Port accessed. Will de-access as appropriate.             Imaging: Reviewed radiology reports from this admission including: abdominal/pelvic CT    Recent Cultures (last 7 days):   Results from last 7 days   Lab Units 03/20/24  0504 03/19/24  0848 03/19/24  0846   BLOOD CULTURE   --  No Growth at 24 hrs. No Growth at 24 hrs.   C DIFF TOXIN B BY PCR  Negative  --   --        Last 24 Hours Medication List:   Current Facility-Administered Medications   Medication Dose Route Frequency Provider Last Rate    albuterol  2 puff Inhalation Q4H PRN Joe Albrecht MD      amLODIPine  5 mg Oral Daily Joe Albrecht MD      benzocaine  2 spray Mucosal Q8H PRN Davin Ferreira DO      carvedilol  6.25 mg Oral BID With Meals Joe Albrecht MD      cefepime  2,000 mg Intravenous Q12H Teena Arango MD 2,000 mg (03/21/24 0523)    diphenhydramine, lidocaine, Al/Mg hydroxide, simethicone  10 mL Swish & Spit Q4H Daryl Saucedo MD      DULoxetine  30 mg Oral Daily Joe Albrecht MD      enoxaparin  40 mg Subcutaneous Daily Joe Albrecht MD      Filgrastim-aafi  300 mcg Subcutaneous Daily Melany Sanchez MD      gabapentin  600 mg Oral TID Joe Albrecht MD      insulin glargine  15 Units Subcutaneous HS Teena Arango MD      insulin lispro  1-6 Units Subcutaneous TID AC Teena Arango MD      insulin lispro  4 Units Subcutaneous TID With Meals Davin Ferreira DO      metoclopramide  10 mg Intravenous Q6H PRN Davin Ferreira DO      metroNIDAZOLE  500 mg Intravenous Q8H Joe Albrecht  mg (03/21/24 1317)    multi-electrolyte  125 mL/hr Intravenous Continuous Hermila Soni  mL/hr (03/21/24 1019)    nicotine  1 patch Transdermal Daily Joe Albrecht MD      nystatin  500,000 Units Swish  & Swallow 4x Daily Hermila Soni MD      OLANZapine  5 mg Oral HS Joe Albrecht MD      ondansetron  4 mg Intravenous Q4H PRN Joe Albrecht MD      pantoprazole  40 mg Intravenous Q12H BERNARD Hermila Soni MD      sucralfate  1 g Oral Q6H BERNARD Elizabeth Ross MD      trimethobenzamide  200 mg Intramuscular Q6H PRN Davin Ferreira DO          Today, Patient Was Seen By: Hermila Soni MD    **Please Note: This note may have been constructed using a voice recognition system.**

## 2024-03-21 NOTE — ASSESSMENT & PLAN NOTE
Presents with nasuea vomiting and diarrhea x2 days, Possibly in setting of DKA vs colitis vs chemotherapy induced  Patient initially meets sepsis criteria with tachycardia, tachypnea and source of colits  given IVF boluses and continuous fluid infusions in the ED  Received Rocephin and flagyl in ED, started him on zosyn and flagyl on 3/19  Recent Labs     03/19/24  0846 03/20/24  0504 03/21/24  0601   WBC 5.40 1.26* 1.57*     Recent Labs     03/19/24  0846 03/20/24  0504   PROCALCITONI 0.12 0.11   Pulse: 88    Plan:  Source: GI source. GI consulted.   Current antibiotics discontinued Zosyn and started on cefepime and metronidazole.  Discontinued Decadron 8 mg oral  Stool and blood cultures results pending  Trend CBC and fever curve

## 2024-03-21 NOTE — ASSESSMENT & PLAN NOTE
Lab Results   Component Value Date    NEUTROABS 0.59 (L) 03/21/2024    NEUTROABS 3.46 06/17/2021    NEUTROABS 3.16 06/15/2021     Plan:  Fillgastrim  300 mcg daily  Prophylaxis: Metronidazole  and cefepime

## 2024-03-21 NOTE — PROGRESS NOTES
"Progress Note - Aman Rocha 54 y.o. male MRN: [id number]    Unit/Bed#: S -01 Encounter: [id number]        Reason for Consult / Principal Problem: Persistent nausea, esophagitis     ASSESSMENT and PLAN:    Principal Problem:    Sepsis (HCC)  Active Problems:    DKA (diabetic ketoacidosis) (HCC)    Nasopharyngeal carcinoma (HCC)  Nausea/Vomiting  Generalized abdominal pain  Acute Diarrhea  Mucositis  Abnormal CT chest/abdomen/pelvis              - thickening esophagus              - thickening colon  - PRN anti-emetics  - agree pantoprazole IV 40mg BID  - agree carafate ACHS slurry   -Agree as needed benzocaine mucosal spray (but \"burns\", could consider magic mouthwash)  - agree nystatin swish and swallow QID for possible candidal esophagitis with current chemotherapy  -Stool enteric pathogen and C. difficile are negative  -Fecal calprotectin awaiting collection  - currently on Cefepime and Flagyl  - Heme/Onc on board and recommending ID consult  -Holding off on EGD today due to absolute neutrophil count of 0.59.  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   Recently moved to area and established with [facility] and was resuming on chemo 5 days ago.   Only one other chemo done in [place] prior to this most recent one.  Two days ago began with diarrhea (non bloody), generalized abdominal pain and nausea.  His current chemotherapy is TPF (which consists of docetaxel (43% get diarrhea), Cisplatin (10% get diarrhea) and Flourouracil (10% get diarrhea). SED normal at 4 with mildly " "elevated CRP at 16.3. CTA chest/ab/pelvis shows no pulmonary embolism, mild bronchial wall thickening, mild/mod thickening of entire esophagus, mild variable wall thickening of colon most notable in left and sigmoid questioning colitis.     Interval History:  Stool enteric pathogen and C. difficile negative.  Fecal calprotectin still awaiting collection.  Absolute neutrophil count today 0.59.   He continues to complain mainly of mouth and throat pain.   Despite nystatin, pantoprazole, carafate standing and Hurricaine spray PRN.  He feels hurricaine spray \"burns\".      Objective:     Vitals: Blood pressure 135/75, pulse 88, temperature 97.8 °F (36.6 °C), resp. rate 18, weight 57.2 kg (126 lb 3.2 oz), SpO2 95%.,Body mass index is 20.37 kg/m².    No intake or output data in the 24 hours ending 03/21/24 0859    Physical Exam:     General Appearance: Moderate distress, alert, appears stated age and cooperative  HEENT: significant mucositis, NCAT, sclera anicteric  Lungs: Clear to auscultation bilaterally, no rales or rhonchi, no labored breathing/accessory muscle use  Heart: Regular rate and rhythm, S1, S2 normal, no murmur, click, rub or gallop  Abdomen: Soft, non-tender, non-distended; bowel sounds normal; no masses or no organomegaly  Extremities: No cyanosis, clubbing, or edema  Neuro: Alert and oriented x 3  Psych: Normal behavior    Invasive Devices       Central Venous Catheter Line  Duration             Port A Cath 03/14/24 Right Chest 7 days              Peripheral Intravenous Line  Duration             Peripheral IV 03/19/24 Left Antecubital 2 days    Peripheral IV 03/19/24 Right Antecubital 1 day                    Lab Results:  Results from last 7 days   Lab Units 03/21/24  0601   WBC Thousand/uL 1.57*   HEMOGLOBIN g/dL 13.6   HEMATOCRIT % 39.8   PLATELETS Thousands/uL 105*   NEUTROS PCT % 38*   LYMPHS PCT % 55*   MONOS PCT % 3*   EOS PCT % 2     Results from last 7 days   Lab Units 03/21/24  0601 " "03/19/24  1741 03/19/24  1739 03/19/24  0846   POTASSIUM mmol/L 3.3*   < >  --  3.8   CHLORIDE mmol/L 105   < >  --  92*   CO2 mmol/L 26   < >  --  23   CO2, I-STAT mmol/L  --   --  23  --    BUN mg/dL 21   < >  --  25   CREATININE mg/dL 0.44*   < >  --  0.68   CALCIUM mg/dL 7.8*   < >  --  9.7   ALK PHOS U/L  --   --   --  93   ALT U/L  --   --   --  32   AST U/L  --   --   --  14   GLUCOSE, ISTAT mg/dl  --   --  314*  --     < > = values in this interval not displayed.     Invalid input(s): \"BILI\"  Results from last 7 days   Lab Units 03/19/24  0846   INR  0.89     Results from last 7 days   Lab Units 03/19/24  0846   LIPASE u/L <6*       Imaging Studies: I have personally reviewed pertinent imaging studies.    XR chest portable    Result Date: 3/19/2024  Impression: No acute cardiopulmonary disease. Workstation performed: [id number]     PE Study with CT Abdomen and Pelvis with contrast    Result Date: 3/19/2024  Impression: 1. No evidence of pulmonary embolism. 2. Mild variable bronchial wall thickening bilaterally. Correlate for bronchial airways disease. No findings for pulmonary infiltrate. 3. Mild/moderate circumferential wall thickening suggested of the entire esophagus suspicious for esophagitis. 4. Mild variable wall thickening of the colon, most notably of the descending and sigmoid colon, possibly involving the ascending colon. Findings suspicious for colitis. The study was marked in EPIC for immediate notification. Workstation performed: [id number]         Adan Fair PA-C  Gastroenterology          "

## 2024-03-21 NOTE — ASSESSMENT & PLAN NOTE
He received cycle #2 under the supervision of Dr. Rashid at [facility], today day #5 admitted to the hospital with fatigue, diarrhea, mucositis, pain in the mouth, dysphagia, dizziness .    Today He reported fatigue, diarrhea, unable to stand up, dizziness when he stands up, mouth sores and pain, loss of appetite.      Plan:  Magic mouth wash.   Monitor for worsening symptoms and unstable vitals.

## 2024-03-22 LAB
ANION GAP SERPL CALCULATED.3IONS-SCNC: 6 MMOL/L (ref 4–13)
BASOPHILS # BLD AUTO: 0 THOUSANDS/ÂΜL (ref 0–0.1)
BASOPHILS NFR BLD AUTO: 0 % (ref 0–1)
BUN SERPL-MCNC: 11 MG/DL (ref 5–25)
CALCIUM SERPL-MCNC: 7.5 MG/DL (ref 8.4–10.2)
CHLORIDE SERPL-SCNC: 101 MMOL/L (ref 96–108)
CO2 SERPL-SCNC: 26 MMOL/L (ref 21–32)
CREAT SERPL-MCNC: 0.35 MG/DL (ref 0.6–1.3)
EOSINOPHIL # BLD AUTO: 0.03 THOUSAND/ÂΜL (ref 0–0.61)
EOSINOPHIL NFR BLD AUTO: 3 % (ref 0–6)
ERYTHROCYTE [DISTWIDTH] IN BLOOD BY AUTOMATED COUNT: 13.1 % (ref 11.6–15.1)
GFR SERPL CREATININE-BSD FRML MDRD: 141 ML/MIN/1.73SQ M
GLUCOSE SERPL-MCNC: 157 MG/DL (ref 65–140)
GLUCOSE SERPL-MCNC: 185 MG/DL (ref 65–140)
GLUCOSE SERPL-MCNC: 242 MG/DL (ref 65–140)
GLUCOSE SERPL-MCNC: 91 MG/DL (ref 65–140)
GLUCOSE SERPL-MCNC: 93 MG/DL (ref 65–140)
HCT VFR BLD AUTO: 37.5 % (ref 36.5–49.3)
HGB BLD-MCNC: 12.8 G/DL (ref 12–17)
IMM GRANULOCYTES # BLD AUTO: 0.01 THOUSAND/UL (ref 0–0.2)
IMM GRANULOCYTES NFR BLD AUTO: 1 % (ref 0–2)
LYMPHOCYTES # BLD AUTO: 0.69 THOUSANDS/ÂΜL (ref 0.6–4.47)
LYMPHOCYTES NFR BLD AUTO: 60 % (ref 14–44)
MAGNESIUM SERPL-MCNC: 1.7 MG/DL (ref 1.9–2.7)
MCH RBC QN AUTO: 30.6 PG (ref 26.8–34.3)
MCHC RBC AUTO-ENTMCNC: 34.1 G/DL (ref 31.4–37.4)
MCV RBC AUTO: 90 FL (ref 82–98)
MONOCYTES # BLD AUTO: 0.1 THOUSAND/ÂΜL (ref 0.17–1.22)
MONOCYTES NFR BLD AUTO: 9 % (ref 4–12)
NEUTROPHILS # BLD AUTO: 0.31 THOUSANDS/ÂΜL (ref 1.85–7.62)
NEUTS SEG NFR BLD AUTO: 27 % (ref 43–75)
NRBC BLD AUTO-RTO: 0 /100 WBCS
PLATELET # BLD AUTO: 109 THOUSANDS/UL (ref 149–390)
PMV BLD AUTO: 10.4 FL (ref 8.9–12.7)
POTASSIUM SERPL-SCNC: 3 MMOL/L (ref 3.5–5.3)
RBC # BLD AUTO: 4.18 MILLION/UL (ref 3.88–5.62)
SODIUM SERPL-SCNC: 133 MMOL/L (ref 135–147)
WBC # BLD AUTO: 1.14 THOUSAND/UL (ref 4.31–10.16)

## 2024-03-22 PROCEDURE — C9113 INJ PANTOPRAZOLE SODIUM, VIA: HCPCS

## 2024-03-22 PROCEDURE — 80048 BASIC METABOLIC PNL TOTAL CA: CPT

## 2024-03-22 PROCEDURE — 92610 EVALUATE SWALLOWING FUNCTION: CPT | Performed by: SPEECH-LANGUAGE PATHOLOGIST

## 2024-03-22 PROCEDURE — 82948 REAGENT STRIP/BLOOD GLUCOSE: CPT

## 2024-03-22 PROCEDURE — 99232 SBSQ HOSP IP/OBS MODERATE 35: CPT | Performed by: INTERNAL MEDICINE

## 2024-03-22 PROCEDURE — 85025 COMPLETE CBC W/AUTO DIFF WBC: CPT

## 2024-03-22 PROCEDURE — 83735 ASSAY OF MAGNESIUM: CPT

## 2024-03-22 RX ORDER — MAGNESIUM SULFATE HEPTAHYDRATE 40 MG/ML
2 INJECTION, SOLUTION INTRAVENOUS ONCE
Status: COMPLETED | OUTPATIENT
Start: 2024-03-22 | End: 2024-03-22

## 2024-03-22 RX ORDER — POTASSIUM CHLORIDE 14.9 MG/ML
20 INJECTION INTRAVENOUS
Qty: 200 ML | Refills: 0 | Status: COMPLETED | OUTPATIENT
Start: 2024-03-22 | End: 2024-03-23

## 2024-03-22 RX ORDER — LOPERAMIDE HCL 1 MG/7.5ML
2 SUSPENSION ORAL 3 TIMES DAILY PRN
Status: DISCONTINUED | OUTPATIENT
Start: 2024-03-22 | End: 2024-03-25 | Stop reason: HOSPADM

## 2024-03-22 RX ADMIN — MAGNESIUM SULFATE HEPTAHYDRATE 2 G: 40 INJECTION, SOLUTION INTRAVENOUS at 10:04

## 2024-03-22 RX ADMIN — SODIUM CHLORIDE, SODIUM GLUCONATE, SODIUM ACETATE, POTASSIUM CHLORIDE, MAGNESIUM CHLORIDE, SODIUM PHOSPHATE, DIBASIC, AND POTASSIUM PHOSPHATE 125 ML/HR: .53; .5; .37; .037; .03; .012; .00082 INJECTION, SOLUTION INTRAVENOUS at 12:01

## 2024-03-22 RX ADMIN — POTASSIUM CHLORIDE 20 MEQ: 14.9 INJECTION, SOLUTION INTRAVENOUS at 10:04

## 2024-03-22 RX ADMIN — DIPHENHYDRAMINE HYDROCHLORIDE AND LIDOCAINE HYDROCHLORIDE AND ALUMINUM HYDROXIDE AND MAGNESIUM HYDRO 10 ML: KIT at 06:10

## 2024-03-22 RX ADMIN — DIPHENHYDRAMINE HYDROCHLORIDE AND LIDOCAINE HYDROCHLORIDE AND ALUMINUM HYDROXIDE AND MAGNESIUM HYDRO 10 ML: KIT at 16:51

## 2024-03-22 RX ADMIN — INSULIN GLARGINE 15 UNITS: 100 INJECTION, SOLUTION SUBCUTANEOUS at 23:41

## 2024-03-22 RX ADMIN — INSULIN LISPRO 1 UNITS: 100 INJECTION, SOLUTION INTRAVENOUS; SUBCUTANEOUS at 12:02

## 2024-03-22 RX ADMIN — DIPHENHYDRAMINE HYDROCHLORIDE AND LIDOCAINE HYDROCHLORIDE AND ALUMINUM HYDROXIDE AND MAGNESIUM HYDRO 10 ML: KIT at 10:15

## 2024-03-22 RX ADMIN — DIPHENHYDRAMINE HYDROCHLORIDE AND LIDOCAINE HYDROCHLORIDE AND ALUMINUM HYDROXIDE AND MAGNESIUM HYDRO 10 ML: KIT at 04:03

## 2024-03-22 RX ADMIN — METRONIDAZOLE 500 MG: 500 INJECTION, SOLUTION INTRAVENOUS at 11:47

## 2024-03-22 RX ADMIN — CEFEPIME 2000 MG: 2 INJECTION, POWDER, FOR SOLUTION INTRAVENOUS at 17:52

## 2024-03-22 RX ADMIN — FILGRASTIM-AAFI 300 MCG: 300 INJECTION, SOLUTION SUBCUTANEOUS at 08:50

## 2024-03-22 RX ADMIN — AMLODIPINE BESYLATE 5 MG: 5 TABLET ORAL at 08:42

## 2024-03-22 RX ADMIN — CARVEDILOL 6.25 MG: 6.25 TABLET, FILM COATED ORAL at 08:42

## 2024-03-22 RX ADMIN — CARVEDILOL 6.25 MG: 6.25 TABLET, FILM COATED ORAL at 16:51

## 2024-03-22 RX ADMIN — PANTOPRAZOLE SODIUM 40 MG: 40 INJECTION, POWDER, FOR SOLUTION INTRAVENOUS at 20:43

## 2024-03-22 RX ADMIN — DIPHENHYDRAMINE HYDROCHLORIDE AND LIDOCAINE HYDROCHLORIDE AND ALUMINUM HYDROXIDE AND MAGNESIUM HYDRO 10 ML: KIT at 20:47

## 2024-03-22 RX ADMIN — METRONIDAZOLE 500 MG: 500 INJECTION, SOLUTION INTRAVENOUS at 20:43

## 2024-03-22 RX ADMIN — GABAPENTIN 600 MG: 300 CAPSULE ORAL at 16:51

## 2024-03-22 RX ADMIN — GABAPENTIN 600 MG: 300 CAPSULE ORAL at 08:42

## 2024-03-22 RX ADMIN — METRONIDAZOLE 500 MG: 500 INJECTION, SOLUTION INTRAVENOUS at 04:03

## 2024-03-22 RX ADMIN — NYSTATIN 500000 UNITS: 100000 SUSPENSION ORAL at 08:42

## 2024-03-22 RX ADMIN — ONDANSETRON 4 MG: 2 INJECTION INTRAMUSCULAR; INTRAVENOUS at 11:56

## 2024-03-22 RX ADMIN — INSULIN LISPRO 3 UNITS: 100 INJECTION, SOLUTION INTRAVENOUS; SUBCUTANEOUS at 17:52

## 2024-03-22 RX ADMIN — PANTOPRAZOLE SODIUM 40 MG: 40 INJECTION, POWDER, FOR SOLUTION INTRAVENOUS at 08:42

## 2024-03-22 RX ADMIN — DULOXETINE HYDROCHLORIDE 30 MG: 30 CAPSULE, DELAYED RELEASE ORAL at 08:42

## 2024-03-22 RX ADMIN — ENOXAPARIN SODIUM 40 MG: 40 INJECTION SUBCUTANEOUS at 08:42

## 2024-03-22 RX ADMIN — SODIUM CHLORIDE, SODIUM GLUCONATE, SODIUM ACETATE, POTASSIUM CHLORIDE, MAGNESIUM CHLORIDE, SODIUM PHOSPHATE, DIBASIC, AND POTASSIUM PHOSPHATE 125 ML/HR: .53; .5; .37; .037; .03; .012; .00082 INJECTION, SOLUTION INTRAVENOUS at 23:41

## 2024-03-22 RX ADMIN — POTASSIUM CHLORIDE 20 MEQ: 14.9 INJECTION, SOLUTION INTRAVENOUS at 11:46

## 2024-03-22 RX ADMIN — LOPERAMIDE HCL 2 MG: 1 SOLUTION ORAL at 12:27

## 2024-03-22 RX ADMIN — CEFEPIME 2000 MG: 2 INJECTION, POWDER, FOR SOLUTION INTRAVENOUS at 06:10

## 2024-03-22 RX ADMIN — SODIUM CHLORIDE, SODIUM GLUCONATE, SODIUM ACETATE, POTASSIUM CHLORIDE, MAGNESIUM CHLORIDE, SODIUM PHOSPHATE, DIBASIC, AND POTASSIUM PHOSPHATE 125 ML/HR: .53; .5; .37; .037; .03; .012; .00082 INJECTION, SOLUTION INTRAVENOUS at 04:08

## 2024-03-22 NOTE — PLAN OF CARE
Problem: Prexisting or High Potential for Compromised Skin Integrity  Goal: Skin integrity is maintained or improved  Description: INTERVENTIONS:  - Identify patients at risk for skin breakdown  - Assess and monitor skin integrity  - Assess and monitor nutrition and hydration status  - Monitor labs   - Assess for incontinence   - Turn and reposition patient  - Assist with mobility/ambulation  - Relieve pressure over bony prominences  - Avoid friction and shearing  - Provide appropriate hygiene as needed including keeping skin clean and dry  - Evaluate need for skin moisturizer/barrier cream  - Collaborate with interdisciplinary team   - Patient/family teaching  - Consider wound care consult   Outcome: Progressing     Problem: Nutrition/Hydration-ADULT  Goal: Nutrient/Hydration intake appropriate for improving, restoring or maintaining nutritional needs  Description: Monitor and assess patient's nutrition/hydration status for malnutrition. Collaborate with interdisciplinary team and initiate plan and interventions as ordered.  Monitor patient's weight and dietary intake as ordered or per policy. Utilize nutrition screening tool and intervene as necessary. Determine patient's food preferences and provide high-protein, high-caloric foods as appropriate.     INTERVENTIONS:  - Monitor oral intake, urinary output, labs, and treatment plans  - Assess nutrition and hydration status and recommend course of action  - Evaluate amount of meals eaten  - Assist patient with eating if necessary   - Allow adequate time for meals  - Recommend/ encourage appropriate diets, oral nutritional supplements, and vitamin/mineral supplements  - Order, calculate, and assess calorie counts as needed  - Recommend, monitor, and adjust tube feedings and TPN/PPN based on assessed needs  - Assess need for intravenous fluids  - Provide specific nutrition/hydration education as appropriate  - Include patient/family/caregiver in decisions related to  nutrition  Outcome: Progressing     Problem: Eat, Sleep, Console Coping  Goal: Pt/Family able to verbalize concerns and demonstrate effective coping strategies  Description: INTERVENTIONS:  1. Assist patient/family to identify coping skills using available support systems and cultural and spiritual values  2. Provide emotional support, including active listening and acknowledgement of concerns of patient and caregivers  3. Reduce environmental stimuli, as able   4. Educate patient/family in relation techniques, as appropriate   5. Assess for spiritual pain/suffering and contact Spiritual Care, Clinical Social Work as needed  6. Recommend Palliative Care consult to provider  Outcome: Progressing

## 2024-03-22 NOTE — ASSESSMENT & PLAN NOTE
Lab Results   Component Value Date    NEUTROABS 0.31 (L) 03/22/2024    NEUTROABS 0.59 (L) 03/21/2024    NEUTROABS 3.46 06/17/2021     Plan:  Fillgastrim  300 mcg daily  Prophylaxis: Metronidazole  and cefepime

## 2024-03-22 NOTE — ASSESSMENT & PLAN NOTE
He received cycle #2 under the supervision of Dr. Rashid at [facility], today day #5 admitted to the hospital with fatigue, diarrhea, mucositis, pain in the mouth, dysphagia, dizziness .    Today He reported fatigue, diarrhea, unable to stand up, dizziness when he stands up, mouth sores and pain, loss of appetite.      Plan:  Magic mouth wash.   Monitor for worsening symptoms and unstable vitals.

## 2024-03-22 NOTE — ASSESSMENT & PLAN NOTE
Ill-defined mass infiltrating mass involving the right nasopharynx, right skull base, sphenoid sinus, questionably in the right posterior ethmoid air cells, right carotid canal, right cavernous sinus and possibly the right middle cranial fossa seen on CT facial bones in Feb 2024  Initially seen by oncology in [place], [place] prior to moving to [place] to be with family and friends.  Now sees oncology at [facility] who have resumed TPF regimen.  Started 2nd cycle 5 days ago  Now presents with abdominal pain, nausea, vomiting and diarrhea for 2 days with evidence of colitis on abdominal CT  Oncology consulted who recommends temporary discontinuation of chemotherapy infusion while inpatient in the setting of DKA and sepsis  Patient's [facility] oncologist needs to be contacted and made aware of his admission and to gain further guidance on discharge planning.    Plan:  Monitor for worsening symptoms.   He reported fatigue, diarrhea, unable to stand up, dizziness when he stands up, mouth sores and pain, loss of appetite

## 2024-03-22 NOTE — SPEECH THERAPY NOTE
Speech-Language Pathology Bedside Swallow Evaluation        Patient Name: Aman Rocha    Today's Date: 3/22/2024     Problem List  Patient Active Problem List   Diagnosis    Diabetes mellitus (HCC)    Hypertension    Small bowel obstruction (HCC)    Type 2 diabetes mellitus, without long-term current use of insulin (HCC)    Paroxysmal atrial fibrillation (HCC)    History of CVA (cerebrovascular accident)    Nicotine abuse    Hyperglycemia    Sepsis (HCC)    Nasopharyngeal carcinoma (HCC)    Acute diarrhea    Mucositis after therapy    Chemotherapy-induced neutropenia        Past Medical History  Past Medical History:   Diagnosis Date    A-fib (HCC)     Diabetes mellitus (HCC)     Hypertension     Stroke (HCC)     x 2 in mid 30's       Past Surgical History  Past Surgical History:   Procedure Laterality Date    ABDOMINAL SURGERY      APPENDECTOMY      CHOLECYSTECTOMY      LAPAROSCOPIC LYSIS INTESTINAL ADHESIONS      NH LAPS ABD PRTM&OMENTUM DX W/WO SPEC BR/WA SPX N/A 6/15/2021    Procedure: Diagnostic laparoscopy, lysis of adhesions;  Surgeon: Wilian Juarez MD;  Location: [facility];  Service: Colorectal         Current Medical Status  Pt is a 54 y.o. male who presented to [facility] with nausea, vomiting and diarrhea. 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 He also has a hx of CVA. DM2 and HTN. Imaging concerning for esophagitis/colitis. He also has mucositis from chemotherapy. Infectious workup has been negative for C. difficile, stool enteric panel has been negative. SLP consuly requested to evaluate the swallow. Per patient he has had worsening odynophagia, dysphagia, mucositis and dysgeusia over the last few months resulting in approx 40lb weight loss. He admits to very poor intake. Has not tried any supplements but reports liquids and colder food items are more tolerable. He also reports burning with some po. He reports limited benefit from magic mouthwash. He  admits that when he was placed on modified diet the other day it was even less appetizing with little-no intake ( was placed on liquids then dysph2 with pudding thick liquids).     Past medical history:   Please see H&P for details    Special Studies:  3/19 CT abd/pelvis pe study w contrast: 1. No evidence of pulmonary embolism. 2. Mild variable bronchial wall thickening bilaterally. Correlate for bronchial airways disease. No findings for pulmonary infiltrate. 3. Mild/moderate circumferential wall thickening suggested of the entire esophagus suspicious for esophagitis. 4. Mild variable wall thickening of the colon, most notably of the descending and sigmoid colon, possibly involving the ascending colon. Findings suspicious for colitis.    3/19 CXR: No acute cardiopulmonary disease.       Swallow Information   Current Risks for Dysphagia & Aspiration: reported new dysphagia and weight loss     Current Symptoms/Concerns:  weight loss, odynophagia, mucositis + nasopharyngeal ca    Current Diet: regular diet and thin liquids      Baseline Diet: regular diet and thin liquids although patient admits to mostly colder softer foods      Baseline Assessment   Behavior/Cognition: alert    Speech/Language Status: able to participate in conversation and able to follow commands    Patient Positioning:  EOB      Swallow Mechanism Exam   Facial: symmetrical  Labial: WFL  Lingual: WFL + erythematous and R lingual edge somewhat asymmetrical  Velum: symmetrical   Mandible: adequate ROM  Dentition: adequate  Vocal quality:dysphonic   Volitional Cough: strong/productive   + erythematous with whitish lesions noted throughout oropharynx     Consistencies Assessed and Performance   Consistencies Administered: thin liquids, puree, and soft  Specific materials administered included: ice cream, pasta, thin liquids    Oral Stage:   Mastication was slow/careful but adequate with the materials administered today.  Bolus formation and transfer  were slow but functional with nosignificant oral residue noted.  No overt s/s reduced oral control.    Pharyngeal Stage:   Swallowing initiation appeared prompt/effortful. + odynophagia reported. Laryngeal rise was judged to be within functional limits. No coughing, throat clearing, change in vocal quality or respiratory status noted today.     Esophageal Concerns: globus sensation reported at times + noted esophagitis on imaging    Summary   Patient presents with mucositis likely due to chemotherapy as well as dysgeusia resulting in reduced appetite and poor po intake as well as odynophagia. He did not appear to have any significant risk for aspiration at this time however has had a significant amount of weight loss with high risk for continued malnutrition. We discussed placement of PEG as option if he is a candidate ( per GI who is already following). Patient would like to think about this. We discussed continuing current diet and ordering foods that are easier/more comfortable to swallow as well as magic mouthwash just prior to intake and addition of chocolate Glucerna in the meantime to try to increase po intake.     RD consult with consideration for calorie count    Recommendations: regular diet and thin liquids     Recommended Form of Meds:  as tolerated      Aspiration precautions and compensatory swallowing strategies: upright posture, slow rate of feeding, small bites/sips, and alternating bites and sips    Results Reviewed with: patient, MD, and family     Dysphagia Goals: pt will tolerate regular textures with thin liquids without s/s of aspiration x3    Plan  Will f/u    Sugar Estrella M.S., CCC-SLP  Speech Language Pathologist   Available via [employer]([place])  [place] #[id number]  [place] #[id number]

## 2024-03-22 NOTE — ASSESSMENT & PLAN NOTE
Lab Results   Component Value Date    HGBA1C 10.2 (H) 03/20/2024       Recent Labs     03/21/24  1614 03/21/24 2059 03/22/24  0759 03/22/24  1114   POCGLU 177* 180* 91 157*       Blood Sugar Average: Last 72 hrs:  (P) 211.821969368602    History of Type 2 DM on metformin and Januvia  Patient presents with two days of worsening abdominal pain, nausea, vomiting and diarrhea  Blood sugar >400, BHB elevated, and anion gap of 17 POA.  VBG/ABG shows alkalotic blood pH, possibly from contraction alkalosis in setting of GI losses  Possibly exacerbated by colitis .  Recent Labs     03/21/24 2059 03/22/24  0759 03/22/24  1114   POCGLU 180* 91 157*        Plan:  Was on Insulin drip, and transitioned to Sub q Insulin.  Currently on Glargine 15, SSI and 4 Units mealtime.  IVF - normal saline for 7 hrs. D/C once able to eat.   Diet: Clear liquid diet  Trend BMP q4h, replete electrolytes as needed

## 2024-03-22 NOTE — PROGRESS NOTES
Replaced by [facility]  Progress Note  Name: Aman Rocha I  MRN: [id number]  Unit/Bed#: S -01 I Date of Admission: 3/19/2024   Date of Service: 3/22/2024 I Hospital Day: 3    Assessment/Plan   * Sepsis (HCC)  Assessment & Plan  Presents with nasuea vomiting and diarrhea x2 days, Possibly in setting of DKA vs colitis vs chemotherapy induced  Patient initially meets sepsis criteria with tachycardia, tachypnea and source of colits  given IVF boluses and continuous fluid infusions in the ED  Received Rocephin and flagyl in ED, started him on zosyn and flagyl on 3/19  Recent Labs     03/20/24  0504 03/21/24  0601 03/22/24  0521   WBC 1.26* 1.57* 1.14*     Recent Labs     03/20/24  0504   PROCALCITONI 0.11   Pulse: 86    Plan:  Source: GI source. GI consulted.   Current antibiotics discontinued Zosyn and started on cefepime and metronidazole.  Discontinued Decadron 8 mg oral  Stool and blood cultures results pending  Trend CBC and fever curve        Chemotherapy-induced neutropenia   Assessment & Plan  Lab Results   Component Value Date    NEUTROABS 0.31 (L) 03/22/2024    NEUTROABS 0.59 (L) 03/21/2024    NEUTROABS 3.46 06/17/2021     Plan:  Fillgastrim  300 mcg daily  Prophylaxis: Metronidazole  and cefepime       Nasopharyngeal carcinoma (HCC)  Assessment & Plan  Ill-defined mass infiltrating mass involving the right nasopharynx, right skull base, sphenoid sinus, questionably in the right posterior ethmoid air cells, right carotid canal, right cavernous sinus and possibly the right middle cranial fossa seen on CT facial bones in Feb 2024  Initially seen by oncology in [place], [place] prior to moving to [place] to be with family and friends.  Now sees oncology at [facility] who have resumed TPF regimen.  Started 2nd cycle 5 days ago  Now presents with abdominal pain, nausea, vomiting and diarrhea for 2 days with evidence of colitis on abdominal CT  Oncology consulted who recommends temporary discontinuation of  chemotherapy infusion while inpatient in the setting of DKA and sepsis  Patient's [facility] oncologist needs to be contacted and made aware of his admission and to gain further guidance on discharge planning.    Plan:  Monitor for worsening symptoms.   He reported fatigue, diarrhea, unable to stand up, dizziness when he stands up, mouth sores and pain, loss of appetite    Mucositis after therapy  Assessment & Plan   He received cycle #2 under the supervision of Dr. Rashid at [facility], today day #5 admitted to the hospital with fatigue, diarrhea, mucositis, pain in the mouth, dysphagia, dizziness .    Today He reported fatigue, diarrhea, unable to stand up, dizziness when he stands up, mouth sores and pain, loss of appetite.      Plan:  Magic mouth wash.   Monitor for worsening symptoms and unstable vitals.           Acute diarrhea  Assessment & Plan  Acute Diarrhea  Abnormal CT chest/abdomen/pelvis: thickening esophagus and thickening colon suspicious for esophagitis and colitis  ESR: Normal  C-reactive protein: 16.3    Plan:  Imodium liquid TID PRN   - PRN anti-emetics  - pantoprazole IV 40mg BID  - carafate ACHS slurry   - nystatin swish and swallow QID and Magic mouth wash  for possible candidal esophagitis with current chemotherapy  -stool enteric pathogens, C.diff negative   Pending calprotectin  - Holding off on EGD today due to absolute neutrophil count of 0.59.  Ideally should be 1.00 or greater  - diet as tolerated for today  -GI following     Hyperglycemia  Assessment & Plan  Lab Results   Component Value Date    HGBA1C 10.2 (H) 03/20/2024       Recent Labs     03/21/24  1614 03/21/24  2059 03/22/24  0759 03/22/24  1114   POCGLU 177* 180* 91 157*       Blood Sugar Average: Last 72 hrs:  (P) 211.3309352839355641    History of Type 2 DM on metformin and Januvia  Patient presents with two days of worsening abdominal pain, nausea, vomiting and diarrhea  Blood sugar >400, BHB elevated, and anion gap of 17 POA.  VBG/ABG  shows alkalotic blood pH, possibly from contraction alkalosis in setting of GI losses  Possibly exacerbated by colitis .  Recent Labs     24  0759 24  1114   POCGLU 180* 91 157*        Plan:  Was on Insulin drip, and transitioned to Sub q Insulin.  Currently on Glargine 15, SSI and 4 Units mealtime.  IVF - normal saline for 7 hrs. D/C once able to eat.   Diet: Clear liquid diet  Trend BMP q4h, replete electrolytes as needed              VTE Pharmacologic Prophylaxis:   High Risk (Score >/= 5) - Pharmacological DVT Prophylaxis Ordered: enoxaparin (Lovenox). Sequential Compression Devices Ordered.    Mobility:   Basic Mobility Inpatient Raw Score: 18  JH-HLM Goal: 6: Walk 10 steps or more  JH-HLM Achieved: 7: Walk 25 feet or more  JH-HLM Goal achieved. Continue to encourage appropriate mobility.    Patient Centered Rounds: I performed bedside rounds with nursing staff today.  Discussions with Specialists or Other Care Team Provider: GI    Education and Discussions with Family / Patient: Attempted to update  (son) via phone. Unable to contact.    Current Length of Stay: 3 day(s)  Current Patient Status: Inpatient   Discharge Plan: Anticipate discharge in 24-48 hrs to home.    Code Status: Level 1 - Full Code    Subjective:   Pt is feeling better. Advanced to regular diet    Objective:     Vitals:   Temp (24hrs), Av.1 °F (37.3 °C), Min:99 °F (37.2 °C), Max:99.2 °F (37.3 °C)    Temp:  [99 °F (37.2 °C)-99.2 °F (37.3 °C)] 99 °F (37.2 °C)  HR:  [86-92] 86  Resp:  [18] 18  BP: (125-146)/(68-94) 125/68  SpO2:  [94 %-98 %] 94 %  Body mass index is 20.37 kg/m².     Input and Output Summary (last 24 hours):     Intake/Output Summary (Last 24 hours) at 3/22/2024 1151  Last data filed at 3/22/2024 0858  Gross per 24 hour   Intake 575 ml   Output 775 ml   Net -200 ml       Physical Exam:   Physical Exam  Constitutional:       General: He is in acute distress.      Appearance: He is  ill-appearing and diaphoretic. He is not toxic-appearing.   HENT:      Head: Normocephalic.      Nose: Nose normal.      Mouth/Throat:      Mouth: Mucous membranes are moist.      Pharynx: Oropharynx is clear. No oropharyngeal exudate.      Comments: Erythematous tongue. - mucositis  Eyes:      General: No scleral icterus.     Conjunctiva/sclera: Conjunctivae normal.      Pupils: Pupils are equal, round, and reactive to light.   Cardiovascular:      Rate and Rhythm: Regular rhythm. Tachycardia present.      Pulses: Normal pulses.      Heart sounds: Normal heart sounds.   Pulmonary:      Effort: Pulmonary effort is normal.      Breath sounds: Normal breath sounds.   Abdominal:      General: Abdomen is flat.      Palpations: Abdomen is soft.      Comments: Patient vomited after palpation of abdomen   Musculoskeletal:         General: Normal range of motion.      Cervical back: Normal range of motion.      Right lower leg: No edema.      Left lower leg: No edema.   Skin:     General: Skin is warm.   Neurological:      General: No focal deficit present.      Mental Status: He is alert and oriented to person, place, and time. Mental status is at baseline.   Psychiatric:         Mood and Affect: Mood normal.         Behavior: Behavior normal.          Additional Data:     Labs:  Results from last 7 days   Lab Units 03/22/24 0521 03/19/24 1739 03/19/24  0846   WBC Thousand/uL 1.14*   < > 5.40   HEMOGLOBIN g/dL 12.8   < > 18.4*   I STAT HEMOGLOBIN   --    < >  --    HEMATOCRIT % 37.5   < > 52.8*   HEMATOCRIT, ISTAT   --    < >  --    PLATELETS Thousands/uL 109*   < > 173   BANDS PCT %  --   --  11*   NEUTROS PCT % 27*   < >  --    LYMPHS PCT % 60*   < >  --    LYMPHO PCT %  --   --  20   MONOS PCT % 9   < >  --    MONO PCT %  --   --  0*   EOS PCT % 3   < > 1    < > = values in this interval not displayed.     Results from last 7 days   Lab Units 03/22/24 0521 03/19/24 1739 03/19/24  0846   SODIUM mmol/L 133*   < > 132*    POTASSIUM mmol/L 3.0*   < > 3.8   CHLORIDE mmol/L 101   < > 92*   CO2 mmol/L 26   < > 23   CO2, I-STAT   --    < >  --    BUN mg/dL 11   < > 25   CREATININE mg/dL 0.35*   < > 0.68   ANION GAP mmol/L 6   < > 17*   CALCIUM mg/dL 7.5*   < > 9.7   ALBUMIN g/dL  --   --  4.5   TOTAL BILIRUBIN mg/dL  --   --  1.28*   ALK PHOS U/L  --   --  93   ALT U/L  --   --  32   AST U/L  --   --  14   GLUCOSE RANDOM mg/dL 93   < > 409*    < > = values in this interval not displayed.     Results from last 7 days   Lab Units 03/19/24  0846   INR  0.89     Results from last 7 days   Lab Units 03/22/24  1114 03/22/24  0759 03/21/24  2059 03/21/24  1614 03/21/24  1120 03/21/24  0735 03/20/24  2121 03/20/24  1629 03/20/24  1112 03/20/24  0800 03/20/24  0016 03/19/24  2206   POC GLUCOSE mg/dl 157* 91 180* 177* 183* 200* 226* 255* 270* 222* 167* 197*     Results from last 7 days   Lab Units 03/20/24  0504   HEMOGLOBIN A1C % 10.2*     Results from last 7 days   Lab Units 03/20/24  0504 03/19/24  1143 03/19/24  0846   LACTIC ACID mmol/L  --  2.0 2.7*   PROCALCITONIN ng/ml 0.11  --  0.12       Lines/Drains:  Invasive Devices       Central Venous Catheter Line  Duration             Port A Cath 03/14/24 Right Chest 8 days              Peripheral Intravenous Line  Duration             Peripheral IV 03/19/24 Left Antecubital 3 days    Peripheral IV 03/19/24 Right Antecubital 3 days                    Central Line:  Goal for removal: Port accessed. Will de-access as appropriate.             Imaging: Reviewed radiology reports from this admission including: chest CT scan    Recent Cultures (last 7 days):   Results from last 7 days   Lab Units 03/20/24  0504 03/19/24  0848 03/19/24  0846   BLOOD CULTURE   --  No Growth at 48 hrs. No Growth at 48 hrs.   C DIFF TOXIN B BY PCR  Negative  --   --        Last 24 Hours Medication List:   Current Facility-Administered Medications   Medication Dose Route Frequency Provider Last Rate    albuterol  2 puff  Inhalation Q4H PRN Joe Albrecht MD      amLODIPine  5 mg Oral Daily Joe Albrecht MD      benzocaine  2 spray Mucosal Q8H PRN Davin Ferreira DO      carvedilol  6.25 mg Oral BID With Meals Joe Albrecht MD      cefepime  2,000 mg Intravenous Q12H Teena Arango MD 2,000 mg (03/22/24 0610)    diphenhydramine, lidocaine, Al/Mg hydroxide, simethicone  10 mL Swish & Spit Q4H Daryl Saucedo MD      DULoxetine  30 mg Oral Daily Joe Albrecht MD      enoxaparin  40 mg Subcutaneous Daily Joe Albrecht MD      Filgrastim-aafi  300 mcg Subcutaneous Daily Melany Sanchez MD      gabapentin  600 mg Oral TID Joe Albrecht MD      insulin glargine  15 Units Subcutaneous HS Teena Arango MD      insulin lispro  1-6 Units Subcutaneous TID AC Teena Arango MD      insulin lispro  4 Units Subcutaneous TID With Meals Davin Ferreira DO      loperamide  2 mg Oral TID PRN Hermila Soni MD      magnesium sulfate  2 g Intravenous Once Hermila Soni MD 2 g (03/22/24 1004)    metoclopramide  10 mg Intravenous Q6H PRN Davin Ferreira DO      metroNIDAZOLE  500 mg Intravenous Q8H Joe Albrecht  mg (03/22/24 1147)    multi-electrolyte  125 mL/hr Intravenous Continuous Hermila Soni  mL/hr (03/22/24 0408)    nicotine  1 patch Transdermal Daily Joe Albrecht MD      nystatin  500,000 Units Swish & Swallow 4x Daily Hermila Soni MD      OLANZapine  5 mg Oral HS Joe Albrecht MD      ondansetron  4 mg Intravenous Q4H PRN Joe Albrehct MD      pantoprazole  40 mg Intravenous Q12H BERNARD Hermila Soni MD      potassium chloride  20 mEq Intravenous Q2H Hermila Soni MD 20 mEq (03/22/24 1146)    sucralfate  1 g Oral Q6H [facility] Elizabeth Ross MD      trimethobenzamide  200 mg Intramuscular Q6H PRN Davin Ferreira DO          Today, Patient Was Seen By: Hermila Soni MD    **Please Note:  This note may have been constructed using a voice recognition system.**

## 2024-03-22 NOTE — PROGRESS NOTES
Progress Note - Aman Rocha 54 y.o. male MRN: [id number]    Unit/Bed#: S -01 Encounter: [id number]        Reason for Consult / Principal Problem: Persistent nausea, esophagitis     ASSESSMENT and PLAN:    Principal Problem:    Sepsis (HCC)  Active Problems:    DKA (diabetic ketoacidosis) (HCC)    Nasopharyngeal carcinoma (HCC)  Nausea/Vomiting  Generalized abdominal pain  Acute Diarrhea  Mucositis  Abnormal CT chest/abdomen/pelvis              - thickening esophagus              - thickening colon  - PRN anti-emetics  - agree pantoprazole IV 40mg BID  - agree carafate ACHS slurry   -Agree Magic mouthwash  - agree nystatin swish and swallow QID for possible candidal esophagitis with current chemotherapy  -Stool enteric pathogen and C. difficile are negative  -Fecal calprotectin awaiting collection  - currently on Cefepime and Flagyl  - Heme/Onc on board and recommending ID consult  -Holding off on EGD today due to absolute neutrophil count of 0.31.  Ideally we would like to see this 1.00 or greater  - diet as tolerated for today and the weekend.  Will reassess him on Monday for possible EGD if ANC is above 0.99      ----------------------------------------------------------------------------------------------------------------    Subjective:   55 y/o male with PMH of stage IV nasopharyngeal cancer on chemo, Afib (not on anticoagulation), DM (on metformin), HTN (on losartan), Stroke who was admitted on 3/19/24 with 2 days of worsening abdominal pain, N/V and diarrhea.   Recently moved to area and established with [facility] and was resuming on chemo 5 days ago.   Only one other chemo done in [place] prior to this most recent one.  Two days ago began with diarrhea (non bloody), generalized abdominal pain and nausea.  His current chemotherapy is TPF (which consists of docetaxel (43% get diarrhea), Cisplatin (10% get diarrhea) and Flourouracil (10% get diarrhea). SED normal at 4 with mildly elevated CRP at 16.3. CTA  chest/ab/pelvis shows no pulmonary embolism, mild bronchial wall thickening, mild/mod thickening of entire esophagus, mild variable wall thickening of colon most notable in left and sigmoid questioning colitis.     Interval History:  Stool enteric pathogen and C. difficile negative.  Fecal calprotectin still awaiting collection.  Absolute neutrophil count today 0.31.   Thankfully his mouth and throat pain has significantly improved.  He continues on nystatin, pantoprazole, carafate standing and Hurricaine spray and Magic mouthwash PRN.       Objective:     Vitals: Blood pressure 125/68, pulse 86, temperature 99 °F (37.2 °C), resp. rate 18, weight 57.2 kg (126 lb 3.2 oz), SpO2 94%.,Body mass index is 20.37 kg/m².      Intake/Output Summary (Last 24 hours) at 3/22/2024 0907  Last data filed at 3/22/2024 0858  Gross per 24 hour   Intake 575 ml   Output 775 ml   Net -200 ml       Physical Exam:     General Appearance: No acute distress, alert, appears stated age and cooperative  HEENT: NCAT, sclera anicteric  Lungs: Clear to auscultation bilaterally, no rales or rhonchi, no labored breathing/accessory muscle use  Heart: Regular rate and rhythm, S1, S2 normal, no murmur, click, rub or gallop  Abdomen: Soft, non-tender, non-distended; bowel sounds normal; no masses or no organomegaly  Extremities: No cyanosis, clubbing, or edema  Neuro: Alert and oriented x 3  Psych: Normal behavior    Invasive Devices       Central Venous Catheter Line  Duration             Port A Cath 03/14/24 Right Chest 8 days              Peripheral Intravenous Line  Duration             Peripheral IV 03/19/24 Left Antecubital 3 days    Peripheral IV 03/19/24 Right Antecubital 2 days                    Lab Results:  Results from last 7 days   Lab Units 03/22/24  0521   WBC Thousand/uL 1.14*   HEMOGLOBIN g/dL 12.8   HEMATOCRIT % 37.5   PLATELETS Thousands/uL 109*   NEUTROS PCT % 27*   LYMPHS PCT % 60*   MONOS PCT % 9   EOS PCT % 3     Results from  "last 7 days   Lab Units 03/22/24  0521 03/19/24  1741 03/19/24  1739 03/19/24  0846   POTASSIUM mmol/L 3.0*   < >  --  3.8   CHLORIDE mmol/L 101   < >  --  92*   CO2 mmol/L 26   < >  --  23   CO2, I-STAT mmol/L  --   --  23  --    BUN mg/dL 11   < >  --  25   CREATININE mg/dL 0.35*   < >  --  0.68   CALCIUM mg/dL 7.5*   < >  --  9.7   ALK PHOS U/L  --   --   --  93   ALT U/L  --   --   --  32   AST U/L  --   --   --  14   GLUCOSE, ISTAT mg/dl  --   --  314*  --     < > = values in this interval not displayed.     Invalid input(s): \"BILI\"  Results from last 7 days   Lab Units 03/19/24  0846   INR  0.89     Results from last 7 days   Lab Units 03/19/24  0846   LIPASE u/L <6*       Imaging Studies: I have personally reviewed pertinent imaging studies.    XR chest portable    Result Date: 3/19/2024  Impression: No acute cardiopulmonary disease. Workstation performed: [id number]     PE Study with CT Abdomen and Pelvis with contrast    Result Date: 3/19/2024  Impression: 1. No evidence of pulmonary embolism. 2. Mild variable bronchial wall thickening bilaterally. Correlate for bronchial airways disease. No findings for pulmonary infiltrate. 3. Mild/moderate circumferential wall thickening suggested of the entire esophagus suspicious for esophagitis. 4. Mild variable wall thickening of the colon, most notably of the descending and sigmoid colon, possibly involving the ascending colon. Findings suspicious for colitis. The study was marked in EPIC for immediate notification. Workstation performed: [id number]         Adan Fair PA-C  Gastroenterology          "

## 2024-03-22 NOTE — ASSESSMENT & PLAN NOTE
Acute Diarrhea  Abnormal CT chest/abdomen/pelvis: thickening esophagus and thickening colon suspicious for esophagitis and colitis  ESR: Normal  C-reactive protein: 16.3    Plan:  Imodium liquid TID PRN   - PRN anti-emetics  - pantoprazole IV 40mg BID  - carafate ACHS slurry   - nystatin swish and swallow QID and Magic mouth wash  for possible candidal esophagitis with current chemotherapy  -stool enteric pathogens, C.diff negative   Pending calprotectin  - Holding off on EGD today due to absolute neutrophil count of 0.59.  Ideally should be 1.00 or greater  - diet as tolerated for today  -GI following

## 2024-03-22 NOTE — ASSESSMENT & PLAN NOTE
Presents with nasuea vomiting and diarrhea x2 days, Possibly in setting of DKA vs colitis vs chemotherapy induced  Patient initially meets sepsis criteria with tachycardia, tachypnea and source of colits  given IVF boluses and continuous fluid infusions in the ED  Received Rocephin and flagyl in ED, started him on zosyn and flagyl on 3/19  Recent Labs     03/20/24  0504 03/21/24  0601 03/22/24  0521   WBC 1.26* 1.57* 1.14*     Recent Labs     03/20/24  0504   PROCALCITONI 0.11   Pulse: 86    Plan:  Source: GI source. GI consulted.   Current antibiotics discontinued Zosyn and started on cefepime and metronidazole.  Discontinued Decadron 8 mg oral  Stool and blood cultures results pending  Trend CBC and fever curve

## 2024-03-23 LAB
ANION GAP SERPL CALCULATED.3IONS-SCNC: 4 MMOL/L (ref 4–13)
BASOPHILS # BLD MANUAL: 0 THOUSAND/UL (ref 0–0.1)
BASOPHILS NFR MAR MANUAL: 0 % (ref 0–1)
BUN SERPL-MCNC: 5 MG/DL (ref 5–25)
CALCIUM SERPL-MCNC: 7.6 MG/DL (ref 8.4–10.2)
CHLORIDE SERPL-SCNC: 101 MMOL/L (ref 96–108)
CO2 SERPL-SCNC: 28 MMOL/L (ref 21–32)
CREAT SERPL-MCNC: 0.37 MG/DL (ref 0.6–1.3)
EOSINOPHIL # BLD MANUAL: 0.04 THOUSAND/UL (ref 0–0.4)
EOSINOPHIL NFR BLD MANUAL: 2 % (ref 0–6)
ERYTHROCYTE [DISTWIDTH] IN BLOOD BY AUTOMATED COUNT: 12.9 % (ref 11.6–15.1)
GFR SERPL CREATININE-BSD FRML MDRD: 138 ML/MIN/1.73SQ M
GLUCOSE SERPL-MCNC: 118 MG/DL (ref 65–140)
GLUCOSE SERPL-MCNC: 125 MG/DL (ref 65–140)
GLUCOSE SERPL-MCNC: 131 MG/DL (ref 65–140)
GLUCOSE SERPL-MCNC: 162 MG/DL (ref 65–140)
GLUCOSE SERPL-MCNC: 257 MG/DL (ref 65–140)
GLUCOSE SERPL-MCNC: 272 MG/DL (ref 65–140)
HCT VFR BLD AUTO: 39.2 % (ref 36.5–49.3)
HGB BLD-MCNC: 13.5 G/DL (ref 12–17)
LYMPHOCYTES # BLD AUTO: 1.13 THOUSAND/UL (ref 0.6–4.47)
LYMPHOCYTES # BLD AUTO: 57 % (ref 14–44)
MAGNESIUM SERPL-MCNC: 1.8 MG/DL (ref 1.9–2.7)
MCH RBC QN AUTO: 30.4 PG (ref 26.8–34.3)
MCHC RBC AUTO-ENTMCNC: 34.4 G/DL (ref 31.4–37.4)
MCV RBC AUTO: 88 FL (ref 82–98)
MONOCYTES # BLD AUTO: 0.27 THOUSAND/UL (ref 0–1.22)
MONOCYTES NFR BLD: 15 % (ref 4–12)
NEUTROPHILS # BLD MANUAL: 0.34 THOUSAND/UL (ref 1.85–7.62)
NEUTS SEG NFR BLD AUTO: 19 % (ref 43–75)
PLATELET # BLD AUTO: 119 THOUSANDS/UL (ref 149–390)
PLATELET BLD QL SMEAR: ABNORMAL
PMV BLD AUTO: 9.9 FL (ref 8.9–12.7)
POTASSIUM SERPL-SCNC: 3.4 MMOL/L (ref 3.5–5.3)
RBC # BLD AUTO: 4.44 MILLION/UL (ref 3.88–5.62)
RBC MORPH BLD: NORMAL
SODIUM SERPL-SCNC: 133 MMOL/L (ref 135–147)
VARIANT LYMPHS # BLD AUTO: 7 %
WBC # BLD AUTO: 1.77 THOUSAND/UL (ref 4.31–10.16)

## 2024-03-23 PROCEDURE — C9113 INJ PANTOPRAZOLE SODIUM, VIA: HCPCS

## 2024-03-23 PROCEDURE — 97163 PT EVAL HIGH COMPLEX 45 MIN: CPT

## 2024-03-23 PROCEDURE — 85007 BL SMEAR W/DIFF WBC COUNT: CPT

## 2024-03-23 PROCEDURE — 99232 SBSQ HOSP IP/OBS MODERATE 35: CPT | Performed by: INTERNAL MEDICINE

## 2024-03-23 PROCEDURE — 85027 COMPLETE CBC AUTOMATED: CPT

## 2024-03-23 PROCEDURE — 83735 ASSAY OF MAGNESIUM: CPT

## 2024-03-23 PROCEDURE — 82948 REAGENT STRIP/BLOOD GLUCOSE: CPT

## 2024-03-23 PROCEDURE — 80048 BASIC METABOLIC PNL TOTAL CA: CPT

## 2024-03-23 RX ORDER — OXYCODONE HYDROCHLORIDE 5 MG/1
5 TABLET ORAL EVERY 4 HOURS PRN
Status: DISCONTINUED | OUTPATIENT
Start: 2024-03-23 | End: 2024-03-25 | Stop reason: HOSPADM

## 2024-03-23 RX ORDER — MAGNESIUM SULFATE HEPTAHYDRATE 40 MG/ML
2 INJECTION, SOLUTION INTRAVENOUS ONCE
Status: COMPLETED | OUTPATIENT
Start: 2024-03-23 | End: 2024-03-23

## 2024-03-23 RX ORDER — LIDOCAINE 50 MG/G
1 PATCH TOPICAL DAILY
Status: DISCONTINUED | OUTPATIENT
Start: 2024-03-23 | End: 2024-03-25 | Stop reason: HOSPADM

## 2024-03-23 RX ORDER — LIDOCAINE 50 MG/G
1 PATCH TOPICAL DAILY
Status: DISCONTINUED | OUTPATIENT
Start: 2024-03-24 | End: 2024-03-23

## 2024-03-23 RX ORDER — XYLITOL/YERBA SANTA
5 AEROSOL, SPRAY WITH PUMP (ML) MUCOUS MEMBRANE 4 TIMES DAILY PRN
Status: DISCONTINUED | OUTPATIENT
Start: 2024-03-23 | End: 2024-03-25 | Stop reason: HOSPADM

## 2024-03-23 RX ORDER — KETOROLAC TROMETHAMINE 30 MG/ML
15 INJECTION, SOLUTION INTRAMUSCULAR; INTRAVENOUS ONCE
Status: COMPLETED | OUTPATIENT
Start: 2024-03-23 | End: 2024-03-23

## 2024-03-23 RX ORDER — POTASSIUM CHLORIDE 14.9 MG/ML
20 INJECTION INTRAVENOUS ONCE
Status: COMPLETED | OUTPATIENT
Start: 2024-03-23 | End: 2024-03-23

## 2024-03-23 RX ORDER — DIPHENHYDRAMINE HYDROCHLORIDE AND LIDOCAINE HYDROCHLORIDE AND ALUMINUM HYDROXIDE AND MAGNESIUM HYDRO
10 KIT EVERY 4 HOURS
Qty: 120 ML | Refills: 0 | Status: DISCONTINUED | OUTPATIENT
Start: 2024-03-23 | End: 2024-03-25

## 2024-03-23 RX ADMIN — POTASSIUM CHLORIDE 20 MEQ: 14.9 INJECTION, SOLUTION INTRAVENOUS at 09:28

## 2024-03-23 RX ADMIN — DIPHENHYDRAMINE HYDROCHLORIDE AND LIDOCAINE HYDROCHLORIDE AND ALUMINUM HYDROXIDE AND MAGNESIUM HYDRO 10 ML: KIT at 09:17

## 2024-03-23 RX ADMIN — DIPHENHYDRAMINE HYDROCHLORIDE AND LIDOCAINE HYDROCHLORIDE AND ALUMINUM HYDROXIDE AND MAGNESIUM HYDRO 10 ML: KIT at 17:40

## 2024-03-23 RX ADMIN — DIPHENHYDRAMINE HYDROCHLORIDE AND LIDOCAINE HYDROCHLORIDE AND ALUMINUM HYDROXIDE AND MAGNESIUM HYDRO 10 ML: KIT at 04:10

## 2024-03-23 RX ADMIN — Medication 5 SPRAY: at 12:35

## 2024-03-23 RX ADMIN — INSULIN LISPRO 4 UNITS: 100 INJECTION, SOLUTION INTRAVENOUS; SUBCUTANEOUS at 09:18

## 2024-03-23 RX ADMIN — INSULIN LISPRO 4 UNITS: 100 INJECTION, SOLUTION INTRAVENOUS; SUBCUTANEOUS at 13:34

## 2024-03-23 RX ADMIN — GABAPENTIN 600 MG: 300 CAPSULE ORAL at 15:30

## 2024-03-23 RX ADMIN — INSULIN LISPRO 3 UNITS: 100 INJECTION, SOLUTION INTRAVENOUS; SUBCUTANEOUS at 18:36

## 2024-03-23 RX ADMIN — INSULIN LISPRO 4 UNITS: 100 INJECTION, SOLUTION INTRAVENOUS; SUBCUTANEOUS at 18:37

## 2024-03-23 RX ADMIN — PANTOPRAZOLE SODIUM 40 MG: 40 INJECTION, POWDER, FOR SOLUTION INTRAVENOUS at 09:17

## 2024-03-23 RX ADMIN — MAGNESIUM SULFATE HEPTAHYDRATE 2 G: 40 INJECTION, SOLUTION INTRAVENOUS at 09:28

## 2024-03-23 RX ADMIN — CEFEPIME 2000 MG: 2 INJECTION, POWDER, FOR SOLUTION INTRAVENOUS at 05:29

## 2024-03-23 RX ADMIN — DIPHENHYDRAMINE HYDROCHLORIDE AND LIDOCAINE HYDROCHLORIDE AND ALUMINUM HYDROXIDE AND MAGNESIUM HYDRO 10 ML: KIT at 12:35

## 2024-03-23 RX ADMIN — CARVEDILOL 6.25 MG: 6.25 TABLET, FILM COATED ORAL at 15:30

## 2024-03-23 RX ADMIN — SODIUM CHLORIDE, SODIUM GLUCONATE, SODIUM ACETATE, POTASSIUM CHLORIDE, MAGNESIUM CHLORIDE, SODIUM PHOSPHATE, DIBASIC, AND POTASSIUM PHOSPHATE 75 ML/HR: .53; .5; .37; .037; .03; .012; .00082 INJECTION, SOLUTION INTRAVENOUS at 20:16

## 2024-03-23 RX ADMIN — INSULIN GLARGINE 15 UNITS: 100 INJECTION, SOLUTION SUBCUTANEOUS at 21:46

## 2024-03-23 RX ADMIN — OLANZAPINE 5 MG: 2.5 TABLET, FILM COATED ORAL at 21:46

## 2024-03-23 RX ADMIN — NYSTATIN 500000 UNITS: 100000 SUSPENSION ORAL at 21:51

## 2024-03-23 RX ADMIN — NYSTATIN 500000 UNITS: 100000 SUSPENSION ORAL at 09:17

## 2024-03-23 RX ADMIN — NYSTATIN 500000 UNITS: 100000 SUSPENSION ORAL at 17:40

## 2024-03-23 RX ADMIN — SODIUM CHLORIDE, SODIUM GLUCONATE, SODIUM ACETATE, POTASSIUM CHLORIDE, MAGNESIUM CHLORIDE, SODIUM PHOSPHATE, DIBASIC, AND POTASSIUM PHOSPHATE 125 ML/HR: .53; .5; .37; .037; .03; .012; .00082 INJECTION, SOLUTION INTRAVENOUS at 06:49

## 2024-03-23 RX ADMIN — KETOROLAC TROMETHAMINE 15 MG: 30 INJECTION, SOLUTION INTRAMUSCULAR; INTRAVENOUS at 15:26

## 2024-03-23 RX ADMIN — FILGRASTIM-AAFI 300 MCG: 300 INJECTION, SOLUTION SUBCUTANEOUS at 09:26

## 2024-03-23 RX ADMIN — LIDOCAINE 1 PATCH: 50 PATCH CUTANEOUS at 18:36

## 2024-03-23 RX ADMIN — INSULIN LISPRO 1 UNITS: 100 INJECTION, SOLUTION INTRAVENOUS; SUBCUTANEOUS at 13:33

## 2024-03-23 RX ADMIN — GABAPENTIN 600 MG: 300 CAPSULE ORAL at 21:45

## 2024-03-23 RX ADMIN — OXYCODONE HYDROCHLORIDE 5 MG: 5 TABLET ORAL at 21:45

## 2024-03-23 RX ADMIN — ONDANSETRON 4 MG: 2 INJECTION INTRAMUSCULAR; INTRAVENOUS at 09:44

## 2024-03-23 RX ADMIN — PANTOPRAZOLE SODIUM 40 MG: 40 INJECTION, POWDER, FOR SOLUTION INTRAVENOUS at 21:51

## 2024-03-23 RX ADMIN — METRONIDAZOLE 500 MG: 500 INJECTION, SOLUTION INTRAVENOUS at 04:10

## 2024-03-23 RX ADMIN — DICLOFENAC SODIUM TOPICAL GEL, 1% 2 G: 10 GEL TOPICAL at 21:51

## 2024-03-23 RX ADMIN — ENOXAPARIN SODIUM 40 MG: 40 INJECTION SUBCUTANEOUS at 09:18

## 2024-03-23 RX ADMIN — DIPHENHYDRAMINE HYDROCHLORIDE AND LIDOCAINE HYDROCHLORIDE AND ALUMINUM HYDROXIDE AND MAGNESIUM HYDRO 10 ML: KIT at 21:44

## 2024-03-23 RX ADMIN — CEFEPIME 2000 MG: 2 INJECTION, POWDER, FOR SOLUTION INTRAVENOUS at 17:40

## 2024-03-23 RX ADMIN — NYSTATIN 500000 UNITS: 100000 SUSPENSION ORAL at 12:35

## 2024-03-23 NOTE — ASSESSMENT & PLAN NOTE
He received cycle #2 under the supervision of Dr. Rashid at [facility], today day #5 admitted to the hospital with fatigue, diarrhea, mucositis, pain in the mouth, dysphagia, dizziness .    Today He reported fatigue, diarrhea, unable to stand up, dizziness when he stands up, mouth sores and pain, loss of appetite.      Plan:  Magic mouth wash.   Mouth kote as prn  Monitor for worsening symptoms and unstable vitals.

## 2024-03-23 NOTE — PLAN OF CARE
Problem: PHYSICAL THERAPY ADULT  Goal: Performs mobility at highest level of function for planned discharge setting.  See evaluation for individualized goals.  Description: Treatment/Interventions: Functional transfer training, LE strengthening/ROM, Elevations, Therapeutic exercise, Endurance training, Gait training, Bed mobility, Equipment eval/education, Patient/family training          See flowsheet documentation for full assessment, interventions and recommendations.  Note:    Problem List: Decreased strength, Decreased endurance, Impaired balance, Decreased mobility, Decreased safety awareness, Impaired sensation, Pain  Assessment: Pt presents with 2 days of worsening abdominal pain, diarrhea, nausea and vomiting. Dx: sepsis, chemotherapy induced neutropenia, nasopharyngeal cancer, mucositis, acute diarrhea, hyperglycemia, DKA, and anemia. order placed for PT eval and tx, w/ activity order of up and out of bed as tolerated. pt presents w/ comorbidities of nasopharyngeal cancer, HTN, a-fib, CVA, and DM and personal factors of mobilizing w/ assistive device and stair(s) to enter home. pt presents w/ pain, weakness, decreased endurance, impaired balance, gait deviations, decreased safety awareness, and fall risk. these impairments are evident in findings from physical examination (weakness), mobility assessment (need for standby assist w/ all phases of mobility when usually mobilizing independently, tolerance to only 140 feet of ambulation, and need for cueing for mobility technique), and Barthel Index: 65/100 and Comfortable Gait Speed: 0.50 m/s (less than 1.0 m/s indicates need for intervention to address falls risk). pt needed input for mobility technique. pt is at risk for falls due to physical and safety awareness deficits. pt's clinical presentation is unstable/unpredictable (evident in poor blood sugar control, need for standby assist w/ all phases of mobility when usually mobilizing independently,  tolerance to only 140 feet of ambulation, pain impacting overall mobility status, and need for input for mobility technique/safety). pt needs inpatient PT tx to improve mobility deficits and progress mobility training as appropriate.        Rehab Resource Intensity Level, PT: III (Minimum Resource Intensity)    See flowsheet documentation for full assessment.

## 2024-03-23 NOTE — ASSESSMENT & PLAN NOTE
Acute Diarrhea  Abnormal CT chest/abdomen/pelvis: thickening esophagus and thickening colon suspicious for esophagitis and colitis  ESR: Normal  C-reactive protein: 16.3    Plan:  Last BM was on 3/22 night. NO BM since then. Will observe for 24 hrs.   Imodium liquid TID PRN   - PRN anti-emetics  - pantoprazole IV 40mg BID  - carafate ACHS slurry   - nystatin swish and swallow QID and Magic mouth wash  for possible candidal esophagitis with current chemotherapy  -stool enteric pathogens, C.diff negative   Pending calprotectin  - Holding off on EGD today due to absolute neutrophil count of 0.59.  Ideally should be 1.00 or greater  - diet as tolerated   -GI following

## 2024-03-23 NOTE — PHYSICAL THERAPY NOTE
PHYSICAL THERAPY EVALUATION NOTE    Patient Name: Aman Rocha  Today's Date: 3/23/2024  AGE:   54 y.o.  Mrn:   [id number]  ADMIT DX:  Dehydration [E86.0]  Colitis [K52.9]  Weakness [R53.1]  Esophagitis [K20.90]  Nasopharyngeal cancer (HCC) [C11.9]  Severe sepsis (HCC) [A41.9, R65.20]    Past Medical History:   Diagnosis Date    A-fib (HCC)     Diabetes mellitus (HCC)     Hypertension     Stroke (HCC)     x 2 in mid 30's     Length Of Stay: 4  PHYSICAL THERAPY EVALUATION :    03/23/24 1627   PT Last Visit   PT Visit Date 03/23/24   Pain Assessment   Pain Assessment Tool 0-10   Pain Score 7   Pain Location/Orientation Location: Neck;Other (Comment)  (throat/mouth)   Hospital Pain Intervention(s) Repositioned;Ambulation/increased activity   Restrictions/Precautions   Other Precautions Multiple lines;Fall Risk;Pain  (Masimo, neutropenic precautions)   Home Living   Type of Home House   Home Layout Two level;Able to live on main level with bedroom/bathroom;Other (Comment)  ([address] w/ railing (2 landings available))   Additional Comments lives w/ 2 sons. pt has supportive family available. ambulates w/ roller walker. independent w/ ADLs. no falls in last 6 months.   General   Additional Pertinent History 3/22/24 at 15:47 glucose was 242   Family/Caregiver Present No   Cognition   Arousal/Participation Alert   Orientation Level Oriented to person;Other (Comment)  (pt was identified w/ full name, birth date)   Following Commands Follows one step commands without difficulty   Comments room air resting pulse ox 97% and 75 BPM.  (light touch impaired right side of face)   Subjective   Subjective pt seen supine in bed. agreed to PT eval. reports having pain of mouth, throat and neck. pt reports feeling weak.   RUE Assessment   RUE Assessment WFL   LUE Assessment   LUE Assessment WFL   RLE Assessment   RLE Assessment WFL  (3+ to 4-/5)   LLE  Assessment   LLE Assessment WFL  (3+ to 4-/5)   Light Touch   RLE Light Touch Grossly intact   LLE Light Touch Grossly intact   Bed Mobility   Supine to Sit 7  Independent   Additional items HOB elevated;Increased time required   Sit to Supine 7  Independent   Additional items HOB elevated;Increased time required   Transfers   Sit to Stand 5  Supervision  (+ lightheaded)   Additional items Increased time required   Stand to Sit 5  Supervision   Additional items Increased time required;Verbal cues  (for body positioning)   Ambulation/Elevation   Gait pattern Foward flexed;Short stride;Decreased R stance   Gait Assistance 5  Supervision   Additional items Verbal cues  (for full step length, breathing technique)   Assistive Device Other (Comment)  (pt pushed IV pole using R UE. declined use of roller walker.)   Distance 140 feet. standing rest break. 120 feet  (additional ambulation not possible due to fatigue, dyspnea)   Stair Management Assistance Not tested  (pt declined trial of stair use due to fatigue)   Balance   Static Sitting Good   Dynamic Sitting Fair   Static Standing Fair  (w/ IV pole support)   Ambulatory Fair -  (w/ IV pole support)   Activity Tolerance   Activity Tolerance Patient limited by fatigue   Nurse Made Aware spoke to Gale COLIN   Assessment   Problem List Decreased strength;Decreased endurance;Impaired balance;Decreased mobility;Decreased safety awareness;Impaired sensation;Pain   Assessment Pt presents with 2 days of worsening abdominal pain, diarrhea, nausea and vomiting. Dx: sepsis, chemotherapy induced neutropenia, nasopharyngeal cancer, mucositis, acute diarrhea, hyperglycemia, DKA, and anemia. order placed for PT eval and tx, w/ activity order of up and out of bed as tolerated. pt presents w/ comorbidities of nasopharyngeal cancer, HTN, a-fib, CVA, and DM and personal factors of mobilizing w/ assistive device and stair(s) to enter home. pt presents w/ pain, weakness, decreased endurance,  impaired balance, gait deviations, decreased safety awareness, and fall risk. these impairments are evident in findings from physical examination (weakness), mobility assessment (need for standby assist w/ all phases of mobility when usually mobilizing independently, tolerance to only 140 feet of ambulation, and need for cueing for mobility technique), and Barthel Index: 65/100 and Comfortable Gait Speed: 0.50 m/s (less than 1.0 m/s indicates need for intervention to address falls risk). pt needed input for mobility technique. pt is at risk for falls due to physical and safety awareness deficits. pt's clinical presentation is unstable/unpredictable (evident in poor blood sugar control, need for standby assist w/ all phases of mobility when usually mobilizing independently, tolerance to only 140 feet of ambulation, pain impacting overall mobility status, and need for input for mobility technique/safety). pt needs inpatient PT tx to improve mobility deficits and progress mobility training as appropriate.   Goals   Patient Goals I want to go home and do things w/ my family.   STG Expiration Date 04/02/24   Short Term Goal #1 pt will: Increase bilateral LE strength 1/2 grade to facilitate independent mobility, Perform bed mobility independently to increase level of independence, Perform all transfers independently to improve independence, Ambulate 300 ft. with least restrictive assistive device independently w/o LOB to improve functional independence and expedite eventual return to participating in family activities, Navigate 15 stair(s) independently with unilateral handrail to facilitate return to previous living environment, Increase ambulatory balance 1 grade to decrease risk for falls, Complete exercise program independently to increase strength and endurance, Tolerate 3 hr OOB to faciliate upright tolerance, Improve gait speed to 0.60 m/s to reduce fall risk and increase independence, and Improve Barthel Index  score to 85 or greater to facilitate independence   PT Treatment Day 0   Plan   Treatment/Interventions Functional transfer training;LE strengthening/ROM;Elevations;Therapeutic exercise;Endurance training;Gait training;Bed mobility;Equipment eval/education;Patient/family training   PT Frequency 3-5x/wk   Discharge Recommendation   Rehab Resource Intensity Level, PT III (Minimum Resource Intensity)   AM-PAC Basic Mobility Inpatient   Turning in Flat Bed Without Bedrails 4   Lying on Back to Sitting on Edge of Flat Bed Without Bedrails 3   Moving Bed to Chair 3   Standing Up From Chair Using Arms 3   Walk in Room 3   Climb 3-5 Stairs With Railing 2   Basic Mobility Inpatient Raw Score 18   Basic Mobility Standardized Score 41.05   [facility] Highest Level Of Mobility   -HLM Goal 6: Walk 10 steps or more   -HLM Achieved 8: Walk 250 feet ot more   Barthel Index   Feeding 10   Bathing 0   Grooming Score 5   Dressing Score 5   Bladder Score 10   Bowels Score 10   Toilet Use Score 5   Transfers (Bed/Chair) Score 10   Mobility (Level Surface) Score 10   Stairs Score 0   Barthel Index Score 65   End of Consult   Patient Position at End of Consult Supine;Bed/Chair alarm activated;All needs within reach     The patient's AM-PAC Basic Mobility Inpatient Short Form Raw Score is 18. A Raw score of greater than 16 suggests the patient may benefit from discharge to home. Please also refer to the recommendation of the Physical Therapist for safe discharge planning.    Skilled PT recommended while in hospital and upon DC to progress pt toward treatment goals.     Thaddeus Hall, PT

## 2024-03-23 NOTE — ASSESSMENT & PLAN NOTE
Lab Results   Component Value Date    NEUTROABS 0.31 (L) 03/22/2024    NEUTROABS 0.59 (L) 03/21/2024    NEUTROABS 3.46 06/17/2021     Plan:  Fillgastrim  300 mcg daily  Prophylaxis:cefepime

## 2024-03-23 NOTE — ASSESSMENT & PLAN NOTE
Lab Results   Component Value Date    HGBA1C 10.2 (H) 03/20/2024       Recent Labs     03/22/24  2102 03/23/24  0114 03/23/24  0730 03/23/24  1125   POCGLU 185* 131 118 162*         Blood Sugar Average: Last 72 hrs:  (P) 185.375    History of Type 2 DM on metformin and Januvia  Patient presents with two days of worsening abdominal pain, nausea, vomiting and diarrhea  Blood sugar >400, BHB elevated, and anion gap of 17 POA.  VBG/ABG shows alkalotic blood pH, possibly from contraction alkalosis in setting of GI losses  Possibly exacerbated by colitis .  Recent Labs     03/23/24  0114 03/23/24  0730 03/23/24  1125   POCGLU 131 118 162*          Plan:  Was on Insulin drip, and transitioned to Sub q Insulin.  Currently on Glargine 15, SSI and 4 Units mealtime.  IVF - normal saline for 7 hrs. D/C once able to eat.   Diet: Clear liquid diet  Trend BMP q4h, replete electrolytes as needed

## 2024-03-23 NOTE — PROGRESS NOTES
"[facility]  Progress Note  Name: Aman Rocha I  MRN: [id number]  Unit/Bed#: S -01 I Date of Admission: 3/19/2024   Date of Service: 3/23/2024 I Hospital Day: 4    Assessment/Plan   * Sepsis (HCC)  Assessment & Plan  Presents with nasuea vomiting and diarrhea x2 days, Possibly in setting of DKA vs colitis vs chemotherapy induced  Patient initially meets sepsis criteria with tachycardia, tachypnea and source of colits  given IVF boluses and continuous fluid infusions in the ED  Received Rocephin and flagyl in ED, started him on zosyn and flagyl on 3/19  Recent Labs     03/21/24  0601 03/22/24  0521 03/23/24  0642   WBC 1.57* 1.14* 1.77*     No results for input(s): \"PROCALCITONI\" in the last 72 hours.  Pulse: 84    Plan:  Source: GI source. GI consulted.   Current antibiotics discontinued Zosyn and started on cefepime   Discontinue Metronidazole on 3/23/24  Discontinued Decadron 8 mg oral  Stool and blood cultures results pending  Trend CBC and fever curve        Chemotherapy-induced neutropenia   Assessment & Plan  Lab Results   Component Value Date    NEUTROABS 0.31 (L) 03/22/2024    NEUTROABS 0.59 (L) 03/21/2024    NEUTROABS 3.46 06/17/2021     Plan:  Fillgastrim  300 mcg daily  Prophylaxis:cefepime       Nasopharyngeal carcinoma (HCC)  Assessment & Plan  Ill-defined mass infiltrating mass involving the right nasopharynx, right skull base, sphenoid sinus, questionably in the right posterior ethmoid air cells, right carotid canal, right cavernous sinus and possibly the right middle cranial fossa seen on CT facial bones in Feb 2024  Initially seen by oncology in [place], [place] prior to moving to [place] to be with family and friends.  Now sees oncology at [facility] who have resumed TPF regimen.  Started 2nd cycle 5 days ago  Now presents with abdominal pain, nausea, vomiting and diarrhea for 2 days with evidence of colitis on abdominal CT  Oncology consulted who recommends temporary " discontinuation of chemotherapy infusion while inpatient in the setting of DKA and sepsis  [facility] and made aware of his admission and to gain further guidance on discharge planning.    Plan:  Monitor for worsening symptoms.   He reported fatigue, diarrhea, unable to stand up, dizziness when he stands up, mouth sores and pain, loss of appetite    Mucositis after therapy  Assessment & Plan   He received cycle #2 under the supervision of Dr. Rashid at [facility], today day #5 admitted to the hospital with fatigue, diarrhea, mucositis, pain in the mouth, dysphagia, dizziness .    Today He reported fatigue, diarrhea, unable to stand up, dizziness when he stands up, mouth sores and pain, loss of appetite.      Plan:  Magic mouth wash.   Mouth kote as prn  Monitor for worsening symptoms and unstable vitals.           Acute diarrhea  Assessment & Plan  Acute Diarrhea  Abnormal CT chest/abdomen/pelvis: thickening esophagus and thickening colon suspicious for esophagitis and colitis  ESR: Normal  C-reactive protein: 16.3    Plan:  Last BM was on 3/22 night. NO BM since then. Will observe for 24 hrs.   Imodium liquid TID PRN   - PRN anti-emetics  - pantoprazole IV 40mg BID  - carafate ACHS slurry   - nystatin swish and swallow QID and Magic mouth wash  for possible candidal esophagitis with current chemotherapy  -stool enteric pathogens, C.diff negative   Pending calprotectin  - Holding off on EGD today due to absolute neutrophil count of 0.59.  Ideally should be 1.00 or greater  - diet as tolerated   -GI following     Hyperglycemia  Assessment & Plan  Lab Results   Component Value Date    HGBA1C 10.2 (H) 03/20/2024       Recent Labs     03/22/24  2102 03/23/24  0114 03/23/24  0730 03/23/24  1125   POCGLU 185* 131 118 162*         Blood Sugar Average: Last 72 hrs:  (P) 185.375    History of Type 2 DM on metformin and Januvia  Patient presents with two days of worsening abdominal pain, nausea,  vomiting and diarrhea  Blood sugar >400, BHB elevated, and anion gap of 17 POA.  VBG/ABG shows alkalotic blood pH, possibly from contraction alkalosis in setting of GI losses  Possibly exacerbated by colitis .  Recent Labs     24  0114 24  0730 24  1125   POCGLU 131 118 162*          Plan:  Was on Insulin drip, and transitioned to Sub q Insulin.  Currently on Glargine 15, SSI and 4 Units mealtime.  IVF - normal saline for 7 hrs. D/C once able to eat.   Diet: Clear liquid diet  Trend BMP q4h, replete electrolytes as needed                       VTE Pharmacologic Prophylaxis:   High Risk (Score >/= 5) - Pharmacological DVT Prophylaxis Ordered: enoxaparin (Lovenox). Sequential Compression Devices Ordered.    Mobility:   Basic Mobility Inpatient Raw Score: 23  JH-HLM Goal: 7: Walk 25 feet or more  JH-HLM Achieved: 5: Stand (1 or more minutes)  JH-HLM Goal NOT achieved. Continue with multidisciplinary rounding and encourage appropriate mobility to improve upon JH-HLM goals.    Patient Centered Rounds: I performed bedside rounds with nursing staff today.  Discussions with Specialists or Other Care Team Provider: GI    Education and Discussions with Family / Patient: Attempted to update  (son) via phone. Unable to contact.    Current Length of Stay: 4 day(s)  Current Patient Status: Inpatient   Discharge Plan: Anticipate discharge in 48 hrs to home.    Code Status: Level 1 - Full Code    Subjective:   C/O ORAL PAIN.     Objective:     Vitals:   Temp (24hrs), Av.3 °F (36.8 °C), Min:97.8 °F (36.6 °C), Max:98.6 °F (37 °C)    Temp:  [97.8 °F (36.6 °C)-98.6 °F (37 °C)] 97.8 °F (36.6 °C)  HR:  [80-84] 84  Resp:  [16-18] 18  BP: (123-149)/(78-91) 139/91  SpO2:  [92 %-97 %] 97 %  Body mass index is 20.37 kg/m².     Input and Output Summary (last 24 hours):     Intake/Output Summary (Last 24 hours) at 3/23/2024 1209  Last data filed at 3/23/2024 0649  Gross per 24 hour   Intake 4408.33 ml    Output 200 ml   Net 4208.33 ml       Physical Exam:   Physical Exam  Constitutional:       General: He is not in acute distress.     Appearance: He is ill-appearing. He is not toxic-appearing or diaphoretic.   HENT:      Head: Normocephalic.      Nose: Nose normal.      Mouth/Throat:      Mouth: Mucous membranes are moist.      Pharynx: Oropharynx is clear. No oropharyngeal exudate.      Comments: Erythematous tongue. - mucositis  Eyes:      General: No scleral icterus.     Conjunctiva/sclera: Conjunctivae normal.      Pupils: Pupils are equal, round, and reactive to light.   Cardiovascular:      Rate and Rhythm: Regular rhythm. Tachycardia present.      Pulses: Normal pulses.      Heart sounds: Normal heart sounds.   Pulmonary:      Effort: Pulmonary effort is normal.      Breath sounds: Normal breath sounds.   Abdominal:      General: Abdomen is flat.      Palpations: Abdomen is soft.   Musculoskeletal:         General: Normal range of motion.      Cervical back: Normal range of motion.      Right lower leg: No edema.      Left lower leg: No edema.   Skin:     General: Skin is warm.   Neurological:      General: No focal deficit present.      Mental Status: He is alert and oriented to person, place, and time. Mental status is at baseline.   Psychiatric:         Mood and Affect: Mood normal.         Behavior: Behavior normal.          Additional Data:     Labs:  Results from last 7 days   Lab Units 03/23/24  0642 03/22/24  0521 03/19/24  1739 03/19/24  0846   WBC Thousand/uL 1.77* 1.14*   < > 5.40   HEMOGLOBIN g/dL 13.5 12.8   < > 18.4*   I STAT HEMOGLOBIN   --   --    < >  --    HEMATOCRIT % 39.2 37.5   < > 52.8*   HEMATOCRIT, ISTAT   --   --    < >  --    PLATELETS Thousands/uL 119* 109*   < > 173   BANDS PCT %  --   --   --  11*   NEUTROS PCT %  --  27*   < >  --    LYMPHS PCT %  --  60*   < >  --    LYMPHO PCT % 57*  --   --  20   MONOS PCT %  --  9   < >  --    MONO PCT % 15*  --   --  0*   EOS PCT % 2 3   <  > 1    < > = values in this interval not displayed.     Results from last 7 days   Lab Units 03/23/24  0642 03/19/24  1739 03/19/24  0846   SODIUM mmol/L 133*   < > 132*   POTASSIUM mmol/L 3.4*   < > 3.8   CHLORIDE mmol/L 101   < > 92*   CO2 mmol/L 28   < > 23   CO2, I-STAT   --    < >  --    BUN mg/dL 5   < > 25   CREATININE mg/dL 0.37*   < > 0.68   ANION GAP mmol/L 4   < > 17*   CALCIUM mg/dL 7.6*   < > 9.7   ALBUMIN g/dL  --   --  4.5   TOTAL BILIRUBIN mg/dL  --   --  1.28*   ALK PHOS U/L  --   --  93   ALT U/L  --   --  32   AST U/L  --   --  14   GLUCOSE RANDOM mg/dL 125   < > 409*    < > = values in this interval not displayed.     Results from last 7 days   Lab Units 03/19/24  0846   INR  0.89     Results from last 7 days   Lab Units 03/23/24  1125 03/23/24  0730 03/23/24  0114 03/22/24  2102 03/22/24  1547 03/22/24  1114 03/22/24  0759 03/21/24  2059 03/21/24  1614 03/21/24  1120 03/21/24  0735 03/20/24  2121   POC GLUCOSE mg/dl 162* 118 131 185* 242* 157* 91 180* 177* 183* 200* 226*     Results from last 7 days   Lab Units 03/20/24  0504   HEMOGLOBIN A1C % 10.2*     Results from last 7 days   Lab Units 03/20/24  0504 03/19/24  1143 03/19/24  0846   LACTIC ACID mmol/L  --  2.0 2.7*   PROCALCITONIN ng/ml 0.11  --  0.12       Lines/Drains:  Invasive Devices       Central Venous Catheter Line  Duration             Port A Cath 03/14/24 Right Chest 9 days                    Central Line:  Goal for removal: Port accessed. Will de-access as appropriate.             Imaging: Personally reviewed the following imaging: chest CT scan    Recent Cultures (last 7 days):   Results from last 7 days   Lab Units 03/20/24  0504 03/19/24  0848 03/19/24  0846   BLOOD CULTURE   --  No Growth at 72 hrs. No Growth at 72 hrs.   C DIFF TOXIN B BY PCR  Negative  --   --        Last 24 Hours Medication List:   Current Facility-Administered Medications   Medication Dose Route Frequency Provider Last Rate    albuterol  2 puff Inhalation  Q4H PRN Joe Albrecht MD      amLODIPine  5 mg Oral Daily Joe Albrecht MD      carvedilol  6.25 mg Oral BID With Meals Joe Albrecht MD      cefepime  2,000 mg Intravenous Q12H Teena Arango MD 2,000 mg (03/23/24 0529)    diphenhydramine, lidocaine, Al/Mg hydroxide, simethicone  10 mL Swish & Spit Q4H Hermila Soni MD      DULoxetine  30 mg Oral Daily Joe Albrecht MD      enoxaparin  40 mg Subcutaneous Daily Joe Albrecht MD      Filgrastim-aafi  300 mcg Subcutaneous Daily Melany Sanchez MD      gabapentin  600 mg Oral TID Joe Albrecht MD      insulin glargine  15 Units Subcutaneous HS Teena Arango MD      insulin lispro  1-6 Units Subcutaneous TID AC Teena Arango MD      insulin lispro  4 Units Subcutaneous TID With Meals Davin Ferreira DO      loperamide  2 mg Oral TID PRN Hermila Soni MD      metoclopramide  10 mg Intravenous Q6H PRN Davin Ferreira DO      multi-electrolyte  75 mL/hr Intravenous Continuous Hermila Soni MD 75 mL/hr (03/23/24 1153)    nicotine  1 patch Transdermal Daily Joe Albrecht MD      nystatin  500,000 Units Swish & Swallow 4x Daily Hermila Soni MD      OLANZapine  5 mg Oral HS Joe Albrecht MD      ondansetron  4 mg Intravenous Q4H PRN Joe Albrecht MD      pantoprazole  40 mg Intravenous Q12H [facility] Hermila Soni MD      saliva substitute  5 spray Mouth/Throat 4x Daily PRN Hermila Soni MD      sucralfate  1 g Oral Q6H [facility] Elizabeth Ross MD      trimethobenzamide  200 mg Intramuscular Q6H PRN Davin Ferreira DO          Today, Patient Was Seen By: Hermila Soni MD    **Please Note: This note may have been constructed using a voice recognition system.**

## 2024-03-23 NOTE — ASSESSMENT & PLAN NOTE
"Presents with nasuea vomiting and diarrhea x2 days, Possibly in setting of DKA vs colitis vs chemotherapy induced  Patient initially meets sepsis criteria with tachycardia, tachypnea and source of colits  given IVF boluses and continuous fluid infusions in the ED  Received Rocephin and flagyl in ED, started him on zosyn and flagyl on 3/19  Recent Labs     03/21/24  0601 03/22/24  0521 03/23/24  0642   WBC 1.57* 1.14* 1.77*     No results for input(s): \"PROCALCITONI\" in the last 72 hours.  Pulse: 84    Plan:  Source: GI source. GI consulted.   Current antibiotics discontinued Zosyn and started on cefepime   Discontinue Metronidazole on 3/23/24  Discontinued Decadron 8 mg oral  Stool and blood cultures results pending  Trend CBC and fever curve      "

## 2024-03-23 NOTE — ASSESSMENT & PLAN NOTE
Ill-defined mass infiltrating mass involving the right nasopharynx, right skull base, sphenoid sinus, questionably in the right posterior ethmoid air cells, right carotid canal, right cavernous sinus and possibly the right middle cranial fossa seen on CT facial bones in Feb 2024  Initially seen by oncology in [place], [place] prior to moving to [place] to be with family and friends.  Now sees oncology at [facility] who have resumed TPF regimen.  Started 2nd cycle 5 days ago  Now presents with abdominal pain, nausea, vomiting and diarrhea for 2 days with evidence of colitis on abdominal CT  Oncology consulted who recommends temporary discontinuation of chemotherapy infusion while inpatient in the setting of DKA and sepsis  Patient's [facility] oncologist needs to be contacted and made aware of his admission and to gain further guidance on discharge planning.    Plan:  Monitor for worsening symptoms.   He reported fatigue, diarrhea, unable to stand up, dizziness when he stands up, mouth sores and pain, loss of appetite

## 2024-03-24 PROBLEM — R19.7 ACUTE DIARRHEA: Status: RESOLVED | Noted: 2024-03-20 | Resolved: 2024-03-24

## 2024-03-24 PROBLEM — A41.9 SEPSIS (HCC): Status: RESOLVED | Noted: 2024-03-19 | Resolved: 2024-03-24

## 2024-03-24 PROBLEM — T45.1X5A CHEMOTHERAPY-INDUCED NEUTROPENIA (HCC): Status: RESOLVED | Noted: 2024-03-21 | Resolved: 2024-03-24

## 2024-03-24 PROBLEM — D70.1 CHEMOTHERAPY-INDUCED NEUTROPENIA (HCC): Status: RESOLVED | Noted: 2024-03-21 | Resolved: 2024-03-24

## 2024-03-24 LAB
ANION GAP SERPL CALCULATED.3IONS-SCNC: 5 MMOL/L (ref 4–13)
BACTERIA BLD CULT: NORMAL
BACTERIA BLD CULT: NORMAL
BUN SERPL-MCNC: 7 MG/DL (ref 5–25)
CALCIUM SERPL-MCNC: 7.6 MG/DL (ref 8.4–10.2)
CHLORIDE SERPL-SCNC: 103 MMOL/L (ref 96–108)
CO2 SERPL-SCNC: 27 MMOL/L (ref 21–32)
CREAT SERPL-MCNC: 0.44 MG/DL (ref 0.6–1.3)
ERYTHROCYTE [DISTWIDTH] IN BLOOD BY AUTOMATED COUNT: 13.3 % (ref 11.6–15.1)
GFR SERPL CREATININE-BSD FRML MDRD: 128 ML/MIN/1.73SQ M
GLUCOSE SERPL-MCNC: 195 MG/DL (ref 65–140)
GLUCOSE SERPL-MCNC: 214 MG/DL (ref 65–140)
GLUCOSE SERPL-MCNC: 232 MG/DL (ref 65–140)
GLUCOSE SERPL-MCNC: 241 MG/DL (ref 65–140)
GLUCOSE SERPL-MCNC: 254 MG/DL (ref 65–140)
HCT VFR BLD AUTO: 37.6 % (ref 36.5–49.3)
HGB BLD-MCNC: 12.9 G/DL (ref 12–17)
MAGNESIUM SERPL-MCNC: 1.8 MG/DL (ref 1.9–2.7)
MCH RBC QN AUTO: 31.2 PG (ref 26.8–34.3)
MCHC RBC AUTO-ENTMCNC: 34.3 G/DL (ref 31.4–37.4)
MCV RBC AUTO: 91 FL (ref 82–98)
PLATELET # BLD AUTO: 139 THOUSANDS/UL (ref 149–390)
PMV BLD AUTO: 9.9 FL (ref 8.9–12.7)
POTASSIUM SERPL-SCNC: 3.9 MMOL/L (ref 3.5–5.3)
RBC # BLD AUTO: 4.13 MILLION/UL (ref 3.88–5.62)
SODIUM SERPL-SCNC: 135 MMOL/L (ref 135–147)
WBC # BLD AUTO: 8.01 THOUSAND/UL (ref 4.31–10.16)

## 2024-03-24 PROCEDURE — C9113 INJ PANTOPRAZOLE SODIUM, VIA: HCPCS

## 2024-03-24 PROCEDURE — 85027 COMPLETE CBC AUTOMATED: CPT

## 2024-03-24 PROCEDURE — 82948 REAGENT STRIP/BLOOD GLUCOSE: CPT

## 2024-03-24 PROCEDURE — 83735 ASSAY OF MAGNESIUM: CPT

## 2024-03-24 PROCEDURE — 99232 SBSQ HOSP IP/OBS MODERATE 35: CPT | Performed by: INTERNAL MEDICINE

## 2024-03-24 PROCEDURE — 80048 BASIC METABOLIC PNL TOTAL CA: CPT

## 2024-03-24 RX ORDER — CALCIUM CARBONATE 500(1250)
1 TABLET ORAL 2 TIMES DAILY WITH MEALS
Status: DISCONTINUED | OUTPATIENT
Start: 2024-03-24 | End: 2024-03-25 | Stop reason: HOSPADM

## 2024-03-24 RX ORDER — ROPINIROLE 0.25 MG/1
0.5 TABLET, FILM COATED ORAL
Status: DISCONTINUED | OUTPATIENT
Start: 2024-03-24 | End: 2024-03-25 | Stop reason: HOSPADM

## 2024-03-24 RX ORDER — MAGNESIUM SULFATE HEPTAHYDRATE 40 MG/ML
2 INJECTION, SOLUTION INTRAVENOUS ONCE
Status: COMPLETED | OUTPATIENT
Start: 2024-03-24 | End: 2024-03-24

## 2024-03-24 RX ORDER — INSULIN LISPRO 100 [IU]/ML
5 INJECTION, SOLUTION INTRAVENOUS; SUBCUTANEOUS
Status: DISCONTINUED | OUTPATIENT
Start: 2024-03-24 | End: 2024-03-25 | Stop reason: HOSPADM

## 2024-03-24 RX ORDER — INSULIN LISPRO 100 [IU]/ML
1-6 INJECTION, SOLUTION INTRAVENOUS; SUBCUTANEOUS
Status: DISCONTINUED | OUTPATIENT
Start: 2024-03-24 | End: 2024-03-25 | Stop reason: HOSPADM

## 2024-03-24 RX ADMIN — DICLOFENAC SODIUM TOPICAL GEL, 1% 2 G: 10 GEL TOPICAL at 09:03

## 2024-03-24 RX ADMIN — AMLODIPINE BESYLATE 5 MG: 5 TABLET ORAL at 09:04

## 2024-03-24 RX ADMIN — CARVEDILOL 6.25 MG: 6.25 TABLET, FILM COATED ORAL at 17:30

## 2024-03-24 RX ADMIN — LIDOCAINE 1 PATCH: 50 PATCH CUTANEOUS at 09:02

## 2024-03-24 RX ADMIN — GABAPENTIN 600 MG: 300 CAPSULE ORAL at 22:02

## 2024-03-24 RX ADMIN — INSULIN LISPRO 4 UNITS: 100 INJECTION, SOLUTION INTRAVENOUS; SUBCUTANEOUS at 10:10

## 2024-03-24 RX ADMIN — OXYCODONE HYDROCHLORIDE 5 MG: 5 TABLET ORAL at 22:02

## 2024-03-24 RX ADMIN — PANTOPRAZOLE SODIUM 40 MG: 40 INJECTION, POWDER, FOR SOLUTION INTRAVENOUS at 09:02

## 2024-03-24 RX ADMIN — CEFEPIME 2000 MG: 2 INJECTION, POWDER, FOR SOLUTION INTRAVENOUS at 17:24

## 2024-03-24 RX ADMIN — Medication 1 TABLET: at 09:03

## 2024-03-24 RX ADMIN — DIPHENHYDRAMINE HYDROCHLORIDE AND LIDOCAINE HYDROCHLORIDE AND ALUMINUM HYDROXIDE AND MAGNESIUM HYDRO 10 ML: KIT at 09:02

## 2024-03-24 RX ADMIN — INSULIN LISPRO 5 UNITS: 100 INJECTION, SOLUTION INTRAVENOUS; SUBCUTANEOUS at 17:31

## 2024-03-24 RX ADMIN — DIPHENHYDRAMINE HYDROCHLORIDE AND LIDOCAINE HYDROCHLORIDE AND ALUMINUM HYDROXIDE AND MAGNESIUM HYDRO 10 ML: KIT at 22:02

## 2024-03-24 RX ADMIN — CEFEPIME 2000 MG: 2 INJECTION, POWDER, FOR SOLUTION INTRAVENOUS at 06:10

## 2024-03-24 RX ADMIN — DIPHENHYDRAMINE HYDROCHLORIDE AND LIDOCAINE HYDROCHLORIDE AND ALUMINUM HYDROXIDE AND MAGNESIUM HYDRO 10 ML: KIT at 12:46

## 2024-03-24 RX ADMIN — PANTOPRAZOLE SODIUM 40 MG: 40 INJECTION, POWDER, FOR SOLUTION INTRAVENOUS at 22:02

## 2024-03-24 RX ADMIN — OLANZAPINE 5 MG: 2.5 TABLET, FILM COATED ORAL at 22:02

## 2024-03-24 RX ADMIN — DICLOFENAC SODIUM TOPICAL GEL, 1% 2 G: 10 GEL TOPICAL at 22:03

## 2024-03-24 RX ADMIN — ROPINIROLE 0.5 MG: 0.25 TABLET, FILM COATED ORAL at 22:28

## 2024-03-24 RX ADMIN — Medication 5 SPRAY: at 09:03

## 2024-03-24 RX ADMIN — INSULIN GLARGINE 15 UNITS: 100 INJECTION, SOLUTION SUBCUTANEOUS at 22:02

## 2024-03-24 RX ADMIN — GABAPENTIN 600 MG: 300 CAPSULE ORAL at 09:03

## 2024-03-24 RX ADMIN — SODIUM CHLORIDE, SODIUM GLUCONATE, SODIUM ACETATE, POTASSIUM CHLORIDE, MAGNESIUM CHLORIDE, SODIUM PHOSPHATE, DIBASIC, AND POTASSIUM PHOSPHATE 75 ML/HR: .53; .5; .37; .037; .03; .012; .00082 INJECTION, SOLUTION INTRAVENOUS at 06:10

## 2024-03-24 RX ADMIN — FILGRASTIM-AAFI 300 MCG: 300 INJECTION, SOLUTION SUBCUTANEOUS at 09:54

## 2024-03-24 RX ADMIN — INSULIN LISPRO 2 UNITS: 100 INJECTION, SOLUTION INTRAVENOUS; SUBCUTANEOUS at 17:31

## 2024-03-24 RX ADMIN — INSULIN LISPRO 3 UNITS: 100 INJECTION, SOLUTION INTRAVENOUS; SUBCUTANEOUS at 22:05

## 2024-03-24 RX ADMIN — DIPHENHYDRAMINE HYDROCHLORIDE AND LIDOCAINE HYDROCHLORIDE AND ALUMINUM HYDROXIDE AND MAGNESIUM HYDRO 10 ML: KIT at 17:23

## 2024-03-24 RX ADMIN — INSULIN LISPRO 2 UNITS: 100 INJECTION, SOLUTION INTRAVENOUS; SUBCUTANEOUS at 10:10

## 2024-03-24 RX ADMIN — INSULIN LISPRO 5 UNITS: 100 INJECTION, SOLUTION INTRAVENOUS; SUBCUTANEOUS at 12:49

## 2024-03-24 RX ADMIN — NYSTATIN 500000 UNITS: 100000 SUSPENSION ORAL at 17:23

## 2024-03-24 RX ADMIN — INSULIN LISPRO 3 UNITS: 100 INJECTION, SOLUTION INTRAVENOUS; SUBCUTANEOUS at 12:48

## 2024-03-24 RX ADMIN — GABAPENTIN 600 MG: 300 CAPSULE ORAL at 17:23

## 2024-03-24 RX ADMIN — NYSTATIN 500000 UNITS: 100000 SUSPENSION ORAL at 22:02

## 2024-03-24 RX ADMIN — NYSTATIN 500000 UNITS: 100000 SUSPENSION ORAL at 09:02

## 2024-03-24 RX ADMIN — ENOXAPARIN SODIUM 40 MG: 40 INJECTION SUBCUTANEOUS at 09:02

## 2024-03-24 RX ADMIN — DULOXETINE HYDROCHLORIDE 30 MG: 30 CAPSULE, DELAYED RELEASE ORAL at 09:03

## 2024-03-24 RX ADMIN — MAGNESIUM SULFATE HEPTAHYDRATE 2 G: 40 INJECTION, SOLUTION INTRAVENOUS at 12:47

## 2024-03-24 RX ADMIN — CARVEDILOL 6.25 MG: 6.25 TABLET, FILM COATED ORAL at 09:03

## 2024-03-24 RX ADMIN — Medication 1 TABLET: at 17:22

## 2024-03-24 RX ADMIN — NYSTATIN 500000 UNITS: 100000 SUSPENSION ORAL at 12:46

## 2024-03-24 NOTE — PLAN OF CARE
Problem: PAIN - ADULT  Goal: Verbalizes/displays adequate comfort level or baseline comfort level  Description: Interventions:  - Encourage patient to monitor pain and request assistance  - Assess pain using appropriate pain scale  - Administer analgesics based on type and severity of pain and evaluate response  - Implement non-pharmacological measures as appropriate and evaluate response  - Consider cultural and social influences on pain and pain management  - Notify physician/advanced practitioner if interventions unsuccessful or patient reports new pain  Outcome: Progressing     Problem: INFECTION - ADULT  Goal: Absence or prevention of progression during hospitalization  Description: INTERVENTIONS:  - Assess and monitor for signs and symptoms of infection  - Monitor lab/diagnostic results  - Monitor all insertion sites, i.e. indwelling lines, tubes, and drains  - Monitor endotracheal if appropriate and nasal secretions for changes in amount and color  - Amanda appropriate cooling/warming therapies per order  - Administer medications as ordered  - Instruct and encourage patient and family to use good hand hygiene technique  - Identify and instruct in appropriate isolation precautions for identified infection/condition  Outcome: Progressing

## 2024-03-24 NOTE — PROGRESS NOTES
[facility]   PROGRESS NOTE- Aman Rocha, 1970, 54 y.o. male MRN: [id number]   Unit/Bed#: S /S -01 Encounter: [id number]   Primary Care Physician: No primary care provider on file.   Date and Time Admitted to Hospital: 3/19/2024  8:22 AM     Assessment/Plan:   * Sepsis (HCC)-resolved as of 3/24/2024  Assessment & Plan  POA: Presents with nasuea vomiting and diarrhea x2 days, Possibly in setting of DKA vs colitis vs chemotherapy induced. Patient initially meets sepsis criteria with tachycardia, tachypnea and source of colits. given IVF boluses and continuous fluid infusions in the ED.  Received Rocephin and flagyl in ED, started him on zosyn and flagyl on 3/19.  Remains afebrile and VSS. WBC improved to normal range. Oral pain due to mucositis improved per patient. C. difficile and enteric panel negative. Blood cultures NGTD.    Recent Labs     03/22/24  0521 03/23/24  0642 03/24/24  0556   WBC 1.14* 1.77* 8.01       Plan:  Monitor temperature and WBC  Follow-up final blood culture results  Continue day 5/5 of cefepime    Mucositis after therapy  Assessment & Plan  POA: He received cycle #2 under the supervision of Dr. Rashid at [facility], today day #5 admitted to the hospital with fatigue, diarrhea, mucositis, pain in the mouth, dysphagia, dizziness. Oral pain improving.    Plan:  Continue Magic mouth wash  Continue antibiotics as above  Possible EGD pending GI recommendations, although patient declines in a.m.     Chemotherapy-induced neutropenia -resolved as of 3/24/2024  Assessment & Plan  Lab Results   Component Value Date    NEUTROABS 0.31 (L) 03/22/2024    NEUTROABS 0.59 (L) 03/21/2024    NEUTROABS 3.46 06/17/2021     Resolved with Filgrastim, WBC and ANC in normal range.     Plan:  D5/5 Filgrastrim 300 mcg daily  Discontinued neutropenic precautions  Continue antibiotics as above  Appreciate Hematology/Oncology input    Nasopharyngeal carcinoma (HCC)  Assessment &  Plan  POA: Ill-defined mass infiltrating mass involving the right nasopharynx, right skull base, sphenoid sinus, questionably in the right posterior ethmoid air cells, right carotid canal, right cavernous sinus and possibly the right middle cranial fossa seen on CT facial bones in Feb 2024  Initially seen by oncology in [place], [place] prior to moving to [place] to be with family and friends.  Now sees oncology at [facility] who have resumed TPF regimen.  Started 2nd cycle 5 days ago  Now presents with abdominal pain, nausea, vomiting and diarrhea for 2 days with evidence of colitis on abdominal CT  Oncology consulted who recommends temporary discontinuation of chemotherapy infusion while inpatient in the setting of DKA and sepsis  Patient's [facility] oncologist needs to be contacted and made aware of his admission and to gain further guidance on discharge planning.    Plan:  Monitor for worsening symptoms.   Outpatient Hematology/Oncology f/u     Hyperglycemia  Assessment & Plan  Lab Results   Component Value Date    HGBA1C 10.2 (H) 03/20/2024       Recent Labs     03/23/24  1705 03/23/24  2122 03/24/24  0759 03/24/24  1125   POCGLU 257* 272* 195* 254*       Blood Sugar Average: Last 72 hrs:  (P) [id number]    History of Type 2 DM on metformin and Januvia  Patient presents with two days of worsening abdominal pain, nausea, vomiting and diarrhea  Blood sugar >400, BHB elevated, and anion gap of 17 POA.  VBG/ABG shows alkalotic blood pH, possibly from contraction alkalosis in setting of GI losses  Possibly exacerbated by colitis .  Recent Labs     03/23/24  2122 03/24/24  0759 03/24/24  1125   POCGLU 272* 195* 254*          Plan:  Continue Glargine 15 units QHS and increased mealtime lispro from 4 units to 5 units for better BG control and started on diabetic diet  Continue SSI with Accu-Cheks 4 times daily with meals and at bedtime   Discontinued IVF given tolerating oral intake well  Adjust insulin regimen as needed based on  BG levels  Hypoglycemia protocol    Acute diarrhea-resolved as of 3/24/2024  Assessment & Plan  Acute Diarrhea  Abnormal CT chest/abdomen/pelvis: thickening esophagus and thickening colon suspicious for esophagitis and colitis  ESR: Normal  C-reactive protein: 16.3    Plan:  Last BM was on 3/22 night. NO BM since then. Will observe for 24 hrs.   Imodium liquid TID PRN   - PRN anti-emetics  - pantoprazole IV 40mg BID  - carafate ACHS slurry   - nystatin swish and swallow QID and Magic mouth wash  for possible candidal esophagitis with current chemotherapy  -stool enteric pathogens, C.diff negative   Pending calprotectin  - Holding off on EGD today due to absolute neutrophil count of 0.59.  Ideally should be 1.00 or greater  - diet as tolerated   -GI following          VTE Pharmacologic Prophylaxis:   High Risk (Score >/= 5) - Pharmacological DVT Prophylaxis Ordered: enoxaparin (Lovenox). Sequential Compression Devices Ordered.    Patient Centered Rounds:  Performed bedside rounds with nursing staff.   Discussions with Specialists or Other Care Team Provider: None  Education and Discussions with Family / Patient: Updated  (son) via phone.    Current Length of Stay: 5 day(s)  Current Patient Status: Inpatient   Discharge Plan: Anticipate discharge in 24-48 hrs to home. -Possibly tomorrow depending on need for EGD.    Code Status: Level 1 - Full Code    Subjective:   Patient was seen and examined at bedside this a.m. in no acute distress. He reports feeling much better today with oral pain improving. Denies fever, chills, chest pain, SOB, abdominal pain, or diarrhea at this time. He remains hemodynamically stable with no significant events overnight and no acute concerns at this time.    Objective:     Vitals:   Temp (24hrs), Av.9 °F (36.6 °C), Min:97.4 °F (36.3 °C), Max:98.8 °F (37.1 °C)    Temp:  [97.4 °F (36.3 °C)-98.8 °F (37.1 °C)] 97.4 °F (36.3 °C)  HR:  [85-87] 85  Resp:  [18] 18  BP:  (125-141)/(79-86) 125/80  SpO2:  [93 %-98 %] 93 %  Body mass index is 20.37 kg/m².     Input and Output Summary (last 24 hours):     Intake/Output Summary (Last 24 hours) at 3/24/2024 1429  Last data filed at 3/23/2024 1548  Gross per 24 hour   Intake --   Output 350 ml   Net -350 ml       Physical Exam  Constitutional:       General: He is not in acute distress.     Appearance: He is ill-appearing. He is not toxic-appearing.   HENT:      Head: Normocephalic and atraumatic.      Mouth/Throat:      Mouth: Mucous membranes are moist.      Pharynx: Posterior oropharyngeal erythema present. No oropharyngeal exudate.      Comments: Small ulcers/lesions scattered  Cardiovascular:      Rate and Rhythm: Normal rate and regular rhythm.      Heart sounds: Normal heart sounds.   Pulmonary:      Effort: No respiratory distress.      Breath sounds: Normal breath sounds. No wheezing, rhonchi or rales.   Abdominal:      General: Bowel sounds are normal. There is no distension.      Palpations: Abdomen is soft.      Tenderness: There is no abdominal tenderness.   Musculoskeletal:         General: Normal range of motion.      Cervical back: Neck supple.      Right lower leg: No edema.      Left lower leg: No edema.   Skin:     General: Skin is warm and dry.      Findings: No lesion or rash.   Neurological:      Mental Status: He is alert and oriented to person, place, and time.   Psychiatric:         Behavior: Behavior normal.        Additional Data:     Labs:  Results from last 7 days   Lab Units 03/24/24  0556 03/23/24  0642 03/22/24  0521 03/19/24  1739 03/19/24  0846   WBC Thousand/uL 8.01 1.77* 1.14*   < > 5.40   HEMOGLOBIN g/dL 12.9 13.5 12.8   < > 18.4*   I STAT HEMOGLOBIN   --   --   --    < >  --    HEMATOCRIT % 37.6 39.2 37.5   < > 52.8*   HEMATOCRIT, ISTAT   --   --   --    < >  --    PLATELETS Thousands/uL 139* 119* 109*   < > 173   BANDS PCT %  --   --   --   --  11*   NEUTROS PCT %  --   --  27*   < >  --    LYMPHS PCT  %  --   --  60*   < >  --    LYMPHO PCT %  --  57*  --   --  20   MONOS PCT %  --   --  9   < >  --    MONO PCT %  --  15*  --   --  0*   EOS PCT %  --  2 3   < > 1    < > = values in this interval not displayed.     Results from last 7 days   Lab Units 03/24/24  0556 03/19/24  1739 03/19/24  0846   SODIUM mmol/L 135   < > 132*   POTASSIUM mmol/L 3.9   < > 3.8   CHLORIDE mmol/L 103   < > 92*   CO2 mmol/L 27   < > 23   CO2, I-STAT   --    < >  --    BUN mg/dL 7   < > 25   CREATININE mg/dL 0.44*   < > 0.68   ANION GAP mmol/L 5   < > 17*   CALCIUM mg/dL 7.6*   < > 9.7   ALBUMIN g/dL  --   --  4.5   TOTAL BILIRUBIN mg/dL  --   --  1.28*   ALK PHOS U/L  --   --  93   ALT U/L  --   --  32   AST U/L  --   --  14   GLUCOSE RANDOM mg/dL 241*   < > 409*    < > = values in this interval not displayed.     Results from last 7 days   Lab Units 03/19/24  0846   INR  0.89     Results from last 7 days   Lab Units 03/24/24  1125 03/24/24  0759 03/23/24  2122 03/23/24  1705 03/23/24  1125 03/23/24  0730 03/23/24  0114 03/22/24  2102 03/22/24  1547 03/22/24  1114 03/22/24  0759 03/21/24  2059   POC GLUCOSE mg/dl 254* 195* 272* 257* 162* 118 131 185* 242* 157* 91 180*     Results from last 7 days   Lab Units 03/20/24  0504   HEMOGLOBIN A1C % 10.2*     Results from last 7 days   Lab Units 03/20/24  0504 03/19/24  1143 03/19/24  0846   LACTIC ACID mmol/L  --  2.0 2.7*   PROCALCITONIN ng/ml 0.11  --  0.12       Lines/Drains:  Invasive Devices       Central Venous Catheter Line  Duration             Port A Cath 03/14/24 Right Chest 10 days                    Central Line:  Goal for removal: N/A - Chronic PICC           Imaging: Reviewed pertinent radiology reports from this admission.   Recent Cultures (last 7 days):   Results from last 7 days   Lab Units 03/20/24  0504 03/19/24  0848 03/19/24  0846   BLOOD CULTURE   --  No Growth After 4 Days. No Growth After 4 Days.   C DIFF TOXIN B BY PCR  Negative  --   --        Last 24 Hours  Medication List:   Current Facility-Administered Medications   Medication Dose Route Frequency Provider Last Rate    albuterol  2 puff Inhalation Q4H PRN Joe Albrecht MD      amLODIPine  5 mg Oral Daily Joe Albrecht MD      calcium carbonate  1 tablet Oral BID With Meals Teena Arango MD      carvedilol  6.25 mg Oral BID With Meals Joe Albrecht MD      cefepime  2,000 mg Intravenous Q12H Teena Arango MD 2,000 mg (03/24/24 0610)    Diclofenac Sodium  2 g Topical 4x Daily Davin Ferreira DO      diphenhydramine, lidocaine, Al/Mg hydroxide, simethicone  10 mL Swish & Spit Q4H Hermila Soni MD      DULoxetine  30 mg Oral Daily Joe Albrecht MD      enoxaparin  40 mg Subcutaneous Daily Joe Albrecht MD      gabapentin  600 mg Oral TID Joe Albrecht MD      insulin glargine  15 Units Subcutaneous HS Teena Arango MD      insulin lispro  1-6 Units Subcutaneous 4x Daily (AC & HS) Teena Arango MD      insulin lispro  5 Units Subcutaneous TID With Meals Teena Arango MD      lidocaine  1 patch Topical Daily Tamir Mckinney MD      loperamide  2 mg Oral TID PRN Hermila Soni MD      magnesium sulfate  2 g Intravenous Once Teena Arango MD 2 g (03/24/24 1247)    metoclopramide  10 mg Intravenous Q6H PRN Davin Ferreira DO      nicotine  1 patch Transdermal Daily Joe Albrecht MD      nystatin  500,000 Units Swish & Swallow 4x Daily Hermila Soni MD      OLANZapine  5 mg Oral HS Joe Albrecht MD      ondansetron  4 mg Intravenous Q4H PRN Joe Albrecht MD      oxyCODONE  5 mg Oral Q4H PRN Davin Ferreira DO      oxyCODONE  2.5 mg Oral Q4H PRN Davin Ferreira DO      pantoprazole  40 mg Intravenous Q12H [facility] Hermila Soni MD      saliva substitute  5 spray Mouth/Throat 4x Daily PRN Hermila Soni MD      trimethobenzamide  200 mg Intramuscular Q6H PRN Davin Ferreira DO          Today, Patient  Was Seen By: Teena Arango MD    **Please Note: This note may have been constructed using a voice recognition system.**

## 2024-03-24 NOTE — ED ATTENDING ATTESTATION
3/19/2024  IFausto DO, saw and evaluated the patient. I have discussed the patient with the resident/non-physician practitioner and agree with the resident's/non-physician practitioner's findings, Plan of Care, and MDM as documented in the resident's/non-physician practitioner's note, except where noted. All available labs and Radiology studies were reviewed.  I was present for key portions of any procedure(s) performed by the resident/non-physician practitioner and I was immediately available to provide assistance.       At this point I agree with the current assessment done in the Emergency Department.  I have conducted an independent evaluation of this patient a history and physical is as follows:    ED Course         Critical Care Time  Procedures

## 2024-03-24 NOTE — ASSESSMENT & PLAN NOTE
Acute Diarrhea  Abnormal CT chest/abdomen/pelvis: thickening esophagus and thickening colon suspicious for esophagitis and colitis  ESR: Normal  C-reactive protein: 16.3    Plan:  Last BM was on 3/22 night. NO BM since then. Will observe for 24 hrs.   Imodium liquid TID PRN   - PRN anti-emetics  - pantoprazole IV 40mg BID  - carafate ACHS slurry   - nystatin swish and swallow QID and Magic mouth wash  for possible candidal esophagitis with current chemotherapy  -stool enteric pathogens, C.diff negative   Pending calprotectin  - Holding off on EGD today as pt declined.  - diet as tolerated   -GI following

## 2024-03-24 NOTE — ASSESSMENT & PLAN NOTE
Lab Results   Component Value Date    HGBA1C 10.2 (H) 03/20/2024       Recent Labs     03/23/24  1705 03/23/24 2122 03/24/24  0759 03/24/24  1125   POCGLU 257* 272* 195* 254*       Blood Sugar Average: Last 72 hrs:  (P) [id number]    History of Type 2 DM on metformin and Januvia  Patient presents with two days of worsening abdominal pain, nausea, vomiting and diarrhea  Blood sugar >400, BHB elevated, and anion gap of 17 POA.  VBG/ABG shows alkalotic blood pH, possibly from contraction alkalosis in setting of GI losses  Possibly exacerbated by colitis .  Recent Labs     03/23/24 2122 03/24/24  0759 03/24/24  1125   POCGLU 272* 195* 254*          Plan:  Continue Glargine 15 units QHS and increased mealtime lispro from 4 units to 5 units for better BG control and started on diabetic diet  Continue SSI with Accu-Cheks 4 times daily with meals and at bedtime   Discontinued IVF given tolerating oral intake well  Adjust insulin regimen as needed based on BG levels  Hypoglycemia protocol

## 2024-03-24 NOTE — ASSESSMENT & PLAN NOTE
Lab Results   Component Value Date    NEUTROABS 0.31 (L) 03/22/2024    NEUTROABS 0.59 (L) 03/21/2024    NEUTROABS 3.46 06/17/2021     Resolved with Filgrastim, WBC and ANC in normal range.     Plan:  D5/5 Filgrastrim 300 mcg daily  Discontinued neutropenic precautions  Continue antibiotics as above  Appreciate Hematology/Oncology input- Out pt follow up with his oncologist.

## 2024-03-24 NOTE — ASSESSMENT & PLAN NOTE
POA: Presents with nasuea vomiting and diarrhea x2 days, Possibly in setting of DKA vs colitis vs chemotherapy induced. Patient initially meets sepsis criteria with tachycardia, tachypnea and source of colits. given IVF boluses and continuous fluid infusions in the ED.  Received Rocephin and flagyl in ED, started him on zosyn and flagyl on 3/19.  Remains afebrile and VSS. WBC improved to normal range. Oral pain due to mucositis improved per patient. C. difficile and enteric panel negative. Blood cultures NGTD.    Recent Labs     03/22/24  0521 03/23/24  0642 03/24/24  0556   WBC 1.14* 1.77* 8.01       Plan:  Monitor temperature and WBC  Follow-up final blood culture results  Completed day 5/5 of cefepime

## 2024-03-24 NOTE — ASSESSMENT & PLAN NOTE
POA: Ill-defined mass infiltrating mass involving the right nasopharynx, right skull base, sphenoid sinus, questionably in the right posterior ethmoid air cells, right carotid canal, right cavernous sinus and possibly the right middle cranial fossa seen on CT facial bones in Feb 2024  Initially seen by oncology in [place], [place] prior to moving to [place] to be with family and friends.  Now sees oncology at [facility] who have resumed TPF regimen.  Started 2nd cycle 5 days ago  Now presents with abdominal pain, nausea, vomiting and diarrhea for 2 days with evidence of colitis on abdominal CT  Oncology consulted who recommends temporary discontinuation of chemotherapy infusion while inpatient in the setting of DKA and sepsis  Patient's [facility] oncologist needs to be contacted and made aware of his admission and to gain further guidance on discharge planning.    Plan:  Monitor for worsening symptoms.   Outpatient Hematology/Oncology f/u

## 2024-03-24 NOTE — ASSESSMENT & PLAN NOTE
POA: He received cycle #2 under the supervision of Dr. Rashid at [facility], today day #5 admitted to the hospital with fatigue, diarrhea, mucositis, pain in the mouth, dysphagia, dizziness. Oral pain improving.    Plan:  Continue Magic mouth wash  Continue antibiotics as above  Possible EGD pending GI recommendations, although patient declines in a.m.

## 2024-03-25 ENCOUNTER — HOME HEALTH ADMISSION (OUTPATIENT)
Dept: HOME HEALTH SERVICES | Facility: HOME HEALTHCARE | Age: 54
End: 2024-03-25

## 2024-03-25 VITALS
SYSTOLIC BLOOD PRESSURE: 132 MMHG | TEMPERATURE: 98.1 F | DIASTOLIC BLOOD PRESSURE: 79 MMHG | HEART RATE: 89 BPM | RESPIRATION RATE: 18 BRPM | WEIGHT: 158 LBS | OXYGEN SATURATION: 95 % | BODY MASS INDEX: 25.5 KG/M2

## 2024-03-25 PROBLEM — E44.0 MODERATE PROTEIN-CALORIE MALNUTRITION (HCC): Status: ACTIVE | Noted: 2024-03-25

## 2024-03-25 LAB
ANION GAP SERPL CALCULATED.3IONS-SCNC: 3 MMOL/L (ref 4–13)
BASOPHILS # BLD MANUAL: 0 THOUSAND/UL (ref 0–0.1)
BASOPHILS NFR MAR MANUAL: 0 % (ref 0–1)
BUN SERPL-MCNC: 10 MG/DL (ref 5–25)
CALCIUM SERPL-MCNC: 8.1 MG/DL (ref 8.4–10.2)
CHLORIDE SERPL-SCNC: 101 MMOL/L (ref 96–108)
CO2 SERPL-SCNC: 30 MMOL/L (ref 21–32)
CREAT SERPL-MCNC: 0.45 MG/DL (ref 0.6–1.3)
EOSINOPHIL # BLD MANUAL: 0 THOUSAND/UL (ref 0–0.4)
EOSINOPHIL NFR BLD MANUAL: 0 % (ref 0–6)
ERYTHROCYTE [DISTWIDTH] IN BLOOD BY AUTOMATED COUNT: 13.9 % (ref 11.6–15.1)
GFR SERPL CREATININE-BSD FRML MDRD: 127 ML/MIN/1.73SQ M
GLUCOSE SERPL-MCNC: 200 MG/DL (ref 65–140)
GLUCOSE SERPL-MCNC: 221 MG/DL (ref 65–140)
GLUCOSE SERPL-MCNC: 231 MG/DL (ref 65–140)
GLUCOSE SERPL-MCNC: 255 MG/DL (ref 65–140)
HCT VFR BLD AUTO: 37.9 % (ref 36.5–49.3)
HGB BLD-MCNC: 12.9 G/DL (ref 12–17)
LYMPHOCYTES # BLD AUTO: 11 % (ref 14–44)
LYMPHOCYTES # BLD AUTO: 2.3 THOUSAND/UL (ref 0.6–4.47)
MAGNESIUM SERPL-MCNC: 1.7 MG/DL (ref 1.9–2.7)
MCH RBC QN AUTO: 31.4 PG (ref 26.8–34.3)
MCHC RBC AUTO-ENTMCNC: 34 G/DL (ref 31.4–37.4)
MCV RBC AUTO: 92 FL (ref 82–98)
MONOCYTES # BLD AUTO: 1.89 THOUSAND/UL (ref 0–1.22)
MONOCYTES NFR BLD: 9 % (ref 4–12)
NEUTROPHILS # BLD MANUAL: 16.76 THOUSAND/UL (ref 1.85–7.62)
NEUTS BAND NFR BLD MANUAL: 17 % (ref 0–8)
NEUTS SEG NFR BLD AUTO: 63 % (ref 43–75)
PLATELET # BLD AUTO: 151 THOUSANDS/UL (ref 149–390)
PLATELET BLD QL SMEAR: ADEQUATE
PMV BLD AUTO: 9.6 FL (ref 8.9–12.7)
POTASSIUM SERPL-SCNC: 4.1 MMOL/L (ref 3.5–5.3)
RBC # BLD AUTO: 4.11 MILLION/UL (ref 3.88–5.62)
RBC MORPH BLD: NORMAL
SODIUM SERPL-SCNC: 134 MMOL/L (ref 135–147)
WBC # BLD AUTO: 20.95 THOUSAND/UL (ref 4.31–10.16)

## 2024-03-25 PROCEDURE — 99232 SBSQ HOSP IP/OBS MODERATE 35: CPT | Performed by: INTERNAL MEDICINE

## 2024-03-25 PROCEDURE — 85007 BL SMEAR W/DIFF WBC COUNT: CPT

## 2024-03-25 PROCEDURE — 82948 REAGENT STRIP/BLOOD GLUCOSE: CPT

## 2024-03-25 PROCEDURE — 83735 ASSAY OF MAGNESIUM: CPT

## 2024-03-25 PROCEDURE — 99239 HOSP IP/OBS DSCHRG MGMT >30: CPT | Performed by: INTERNAL MEDICINE

## 2024-03-25 PROCEDURE — C9113 INJ PANTOPRAZOLE SODIUM, VIA: HCPCS

## 2024-03-25 PROCEDURE — 80048 BASIC METABOLIC PNL TOTAL CA: CPT

## 2024-03-25 PROCEDURE — 85027 COMPLETE CBC AUTOMATED: CPT

## 2024-03-25 RX ORDER — LIDOCAINE 50 MG/G
1 PATCH TOPICAL DAILY
Qty: 30 PATCH | Refills: 0 | Status: SHIPPED | OUTPATIENT
Start: 2024-03-26 | End: 2024-04-25

## 2024-03-25 RX ORDER — ROPINIROLE 0.5 MG/1
0.5 TABLET, FILM COATED ORAL
Qty: 30 TABLET | Refills: 0 | Status: SHIPPED | OUTPATIENT
Start: 2024-03-25 | End: 2024-04-24

## 2024-03-25 RX ORDER — OXYCODONE HYDROCHLORIDE 5 MG/1
5 TABLET ORAL EVERY 6 HOURS PRN
Qty: 20 TABLET | Refills: 0 | Status: SHIPPED | OUTPATIENT
Start: 2024-03-25 | End: 2024-03-30

## 2024-03-25 RX ORDER — LANOLIN ALCOHOL/MO/W.PET/CERES
400 CREAM (GRAM) TOPICAL 2 TIMES DAILY
Qty: 60 TABLET | Refills: 0 | Status: SHIPPED | OUTPATIENT
Start: 2024-03-26 | End: 2024-04-25

## 2024-03-25 RX ORDER — DIPHENHYDRAMINE HYDROCHLORIDE AND LIDOCAINE HYDROCHLORIDE AND ALUMINUM HYDROXIDE AND MAGNESIUM HYDRO
10 KIT EVERY 4 HOURS PRN
Qty: 237 ML | Refills: 0 | Status: SHIPPED | OUTPATIENT
Start: 2024-03-25

## 2024-03-25 RX ORDER — ALBUTEROL SULFATE 90 UG/1
2 AEROSOL, METERED RESPIRATORY (INHALATION) EVERY 4 HOURS PRN
Qty: 18 G | Refills: 0 | Status: SHIPPED | OUTPATIENT
Start: 2024-03-25 | End: 2024-04-09

## 2024-03-25 RX ORDER — DULOXETIN HYDROCHLORIDE 30 MG/1
30 CAPSULE, DELAYED RELEASE ORAL DAILY
Qty: 30 CAPSULE | Refills: 0 | Status: SHIPPED | OUTPATIENT
Start: 2024-03-26 | End: 2024-04-25

## 2024-03-25 RX ORDER — XYLITOL/YERBA SANTA
5 AEROSOL, SPRAY WITH PUMP (ML) MUCOUS MEMBRANE 4 TIMES DAILY PRN
Qty: 60 ML | Refills: 0 | Status: SHIPPED | OUTPATIENT
Start: 2024-03-25 | End: 2024-04-24

## 2024-03-25 RX ORDER — LANOLIN ALCOHOL/MO/W.PET/CERES
400 CREAM (GRAM) TOPICAL 2 TIMES DAILY
Status: DISCONTINUED | OUTPATIENT
Start: 2024-03-26 | End: 2024-03-25 | Stop reason: HOSPADM

## 2024-03-25 RX ORDER — MAGNESIUM SULFATE HEPTAHYDRATE 40 MG/ML
4 INJECTION, SOLUTION INTRAVENOUS ONCE
Status: COMPLETED | OUTPATIENT
Start: 2024-03-25 | End: 2024-03-25

## 2024-03-25 RX ORDER — OLANZAPINE 5 MG/1
5 TABLET ORAL
Qty: 30 TABLET | Refills: 0 | Status: SHIPPED | OUTPATIENT
Start: 2024-03-25 | End: 2024-04-24

## 2024-03-25 RX ORDER — AMLODIPINE BESYLATE 5 MG/1
5 TABLET ORAL DAILY
Qty: 30 TABLET | Refills: 0 | Status: SHIPPED | OUTPATIENT
Start: 2024-03-26 | End: 2024-04-25

## 2024-03-25 RX ORDER — PANTOPRAZOLE SODIUM 40 MG/1
40 TABLET, DELAYED RELEASE ORAL EVERY 12 HOURS SCHEDULED
Qty: 60 TABLET | Refills: 0 | Status: SHIPPED | OUTPATIENT
Start: 2024-03-25 | End: 2024-04-24

## 2024-03-25 RX ORDER — CARVEDILOL 6.25 MG/1
6.25 TABLET ORAL 2 TIMES DAILY WITH MEALS
Qty: 60 TABLET | Refills: 0 | Status: SHIPPED | OUTPATIENT
Start: 2024-03-25 | End: 2024-04-24

## 2024-03-25 RX ORDER — CALCIUM CARBONATE 500(1250)
1 TABLET ORAL 2 TIMES DAILY WITH MEALS
Qty: 60 TABLET | Refills: 0 | Status: SHIPPED | OUTPATIENT
Start: 2024-03-25 | End: 2024-04-24

## 2024-03-25 RX ORDER — GABAPENTIN 300 MG/1
600 CAPSULE ORAL 3 TIMES DAILY
Qty: 180 CAPSULE | Refills: 0 | Status: SHIPPED | OUTPATIENT
Start: 2024-03-25 | End: 2024-04-24

## 2024-03-25 RX ORDER — PANTOPRAZOLE SODIUM 40 MG/1
40 TABLET, DELAYED RELEASE ORAL EVERY 12 HOURS SCHEDULED
Status: DISCONTINUED | OUTPATIENT
Start: 2024-03-25 | End: 2024-03-25 | Stop reason: HOSPADM

## 2024-03-25 RX ADMIN — Medication 1 TABLET: at 08:30

## 2024-03-25 RX ADMIN — INSULIN LISPRO 5 UNITS: 100 INJECTION, SOLUTION INTRAVENOUS; SUBCUTANEOUS at 12:52

## 2024-03-25 RX ADMIN — GABAPENTIN 600 MG: 300 CAPSULE ORAL at 08:30

## 2024-03-25 RX ADMIN — AMLODIPINE BESYLATE 5 MG: 5 TABLET ORAL at 08:30

## 2024-03-25 RX ADMIN — PANTOPRAZOLE SODIUM 40 MG: 40 INJECTION, POWDER, FOR SOLUTION INTRAVENOUS at 08:30

## 2024-03-25 RX ADMIN — MAGNESIUM SULFATE HEPTAHYDRATE 4 G: 40 INJECTION, SOLUTION INTRAVENOUS at 08:39

## 2024-03-25 RX ADMIN — NYSTATIN 500000 UNITS: 100000 SUSPENSION ORAL at 12:51

## 2024-03-25 RX ADMIN — INSULIN LISPRO 5 UNITS: 100 INJECTION, SOLUTION INTRAVENOUS; SUBCUTANEOUS at 08:34

## 2024-03-25 RX ADMIN — NYSTATIN 500000 UNITS: 100000 SUSPENSION ORAL at 08:29

## 2024-03-25 RX ADMIN — INSULIN LISPRO 2 UNITS: 100 INJECTION, SOLUTION INTRAVENOUS; SUBCUTANEOUS at 12:52

## 2024-03-25 RX ADMIN — INSULIN LISPRO 2 UNITS: 100 INJECTION, SOLUTION INTRAVENOUS; SUBCUTANEOUS at 08:34

## 2024-03-25 RX ADMIN — CARVEDILOL 6.25 MG: 6.25 TABLET, FILM COATED ORAL at 08:30

## 2024-03-25 RX ADMIN — DULOXETINE HYDROCHLORIDE 30 MG: 30 CAPSULE, DELAYED RELEASE ORAL at 08:29

## 2024-03-25 RX ADMIN — ENOXAPARIN SODIUM 40 MG: 40 INJECTION SUBCUTANEOUS at 08:29

## 2024-03-25 NOTE — PROGRESS NOTES
Progress Note - Aman Rocha 54 y.o. 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    Nausea, vomiting, diarrhea  Abnormal CT scan, esophagitis, colitis  Patient reports symptoms have resolved.  He denies any bowel movement since yesterday.  He reports no significant pain with swallowing.  He has a new leukocytosis today with white count of 20, thought to be from filgrastim.  At this time he is not interested in pursuing EGD.  Suspected symptoms in the setting of chemo.    -Discussed pursuing EGD to evaluate underlying esophagitis on imaging as well as his symptoms of pain with swallowing, though he reports this is improved.  He reports he would not like to have this performed and is refusing EGD at this time.    -Can continue PPI twice daily.  - Can continue course of nystatin swish and swallow  -Can continue Magic mouthwash as needed    -Patient reports resolution of diarrhea. Continue to monitor.    -Advised patient to always reach out to us if he has any worsening of symptoms for EGD at that time.  He reports understanding.    -Discussed with primary team.  ----------------------------------------------------------------------------------------------------------------    Subjective:     Patient reports he is doing well from a GI side.  He reports he has been tolerating diet.  He reports only a minimal amount of difficulty swallowing.  He denies any bowel movement since yesterday, no longer having any diarrhea.  No nausea or vomiting.  He does not want to have EGD done.    Objective:     Vitals: Blood pressure 117/74, pulse 88, temperature 98.6 °F (37 °C), resp. rate 18, weight 57.2 kg (126 lb 3.2 oz), SpO2 92%.,Body mass index  "is 20.37 kg/m².      Intake/Output Summary (Last 24 hours) at 3/25/2024 1117  Last data filed at 3/25/2024 0201  Gross per 24 hour   Intake 120 ml   Output --   Net 120 ml       Physical Exam:     General Appearance: Sitting comfortably in bed.  Does not appear in distress.  No oral candidiasis noted.  Lungs: Clear to auscultation bilaterally, no rales or rhonchi, no labored breathing/accessory muscle use  Heart: Regular rate and rhythm, S1, S2 normal, no murmur, click, rub or gallop  Abdomen: Soft, non-tender, non-distended; bowel sounds normal; no masses or no organomegaly  Extremities: No cyanosis, clubbing, or edema    Invasive Devices       Central Venous Catheter Line  Duration             Port A Cath 03/14/24 Right Chest 11 days                    Lab Results:  Results from last 7 days   Lab Units 03/25/24  0602 03/23/24  0642 03/22/24  0521   WBC Thousand/uL 20.95*   < > 1.14*   HEMOGLOBIN g/dL 12.9   < > 12.8   HEMATOCRIT % 37.9   < > 37.5   PLATELETS Thousands/uL 151   < > 109*   NEUTROS PCT %  --   --  27*   LYMPHS PCT %  --   --  60*   LYMPHO PCT % 11*   < >  --    MONOS PCT %  --   --  9   MONO PCT % 9   < >  --    EOS PCT % 0   < > 3    < > = values in this interval not displayed.     Results from last 7 days   Lab Units 03/25/24  0602 03/19/24  1741 03/19/24  1739 03/19/24  0846   POTASSIUM mmol/L 4.1   < >  --  3.8   CHLORIDE mmol/L 101   < >  --  92*   CO2 mmol/L 30   < >  --  23   CO2, I-STAT mmol/L  --   --  23  --    BUN mg/dL 10   < >  --  25   CREATININE mg/dL 0.45*   < >  --  0.68   CALCIUM mg/dL 8.1*   < >  --  9.7   ALK PHOS U/L  --   --   --  93   ALT U/L  --   --   --  32   AST U/L  --   --   --  14   GLUCOSE, ISTAT mg/dl  --   --  314*  --     < > = values in this interval not displayed.     Invalid input(s): \"BILI\"  Results from last 7 days   Lab Units 03/19/24  0846   INR  0.89     Results from last 7 days   Lab Units 03/19/24  0846   LIPASE u/L <6*       Imaging Studies: I have " personally reviewed pertinent imaging studies.    XR chest portable    Result Date: 3/19/2024  Impression: No acute cardiopulmonary disease. Workstation performed: [id number]     PE Study with CT Abdomen and Pelvis with contrast    Result Date: 3/19/2024  Impression: 1. No evidence of pulmonary embolism. 2. Mild variable bronchial wall thickening bilaterally. Correlate for bronchial airways disease. No findings for pulmonary infiltrate. 3. Mild/moderate circumferential wall thickening suggested of the entire esophagus suspicious for esophagitis. 4. Mild variable wall thickening of the colon, most notably of the descending and sigmoid colon, possibly involving the ascending colon. Findings suspicious for colitis. The study was marked in EPIC for immediate notification. Workstation performed: [id number]

## 2024-03-25 NOTE — DISCHARGE SUMMARY
[facility]  Discharge- mAan Rocha 1970, 54 y.o. male MRN: [id number]  Unit/Bed#: S -01 Encounter: [id number]  Primary Care Provider: No primary care provider on file.   Date and time admitted to hospital: 3/19/2024  8:22 AM    * Sepsis (HCC)-resolved as of 3/24/2024  Assessment & Plan  POA: Presents with nasuea vomiting and diarrhea x2 days, Possibly in setting of DKA vs colitis vs chemotherapy induced. Patient initially meets sepsis criteria with tachycardia, tachypnea and source of colits. given IVF boluses and continuous fluid infusions in the ED.  Received Rocephin and flagyl in ED, started him on zosyn and flagyl on 3/19.  Remains afebrile and VSS. WBC improved to normal range. Oral pain due to mucositis improved per patient. C. difficile and enteric panel negative. Blood cultures NGTD.    Recent Labs     03/22/24  0521 03/23/24  0642 03/24/24  0556   WBC 1.14* 1.77* 8.01       Plan:  Monitor temperature and WBC  Follow-up final blood culture results  Completed day 5/5 of cefepime    Nasopharyngeal carcinoma (HCC)  Assessment & Plan  POA: Ill-defined mass infiltrating mass involving the right nasopharynx, right skull base, sphenoid sinus, questionably in the right posterior ethmoid air cells, right carotid canal, right cavernous sinus and possibly the right middle cranial fossa seen on CT facial bones in Feb 2024  Initially seen by oncology in [place], [place] prior to moving to [place] to be with family and friends.  Now sees oncology at [facility] who have resumed TPF regimen.  Started 2nd cycle 5 days ago  Now presents with abdominal pain, nausea, vomiting and diarrhea for 2 days with evidence of colitis on abdominal CT  Oncology consulted who recommends temporary discontinuation of chemotherapy infusion while inpatient in the setting of DKA and sepsis  Patient's [facility] oncologist needs to be contacted and made aware of his admission and to gain further guidance on discharge  planning.    Plan:  Monitor for worsening symptoms.   Outpatient Hematology/Oncology f/u     Chemotherapy-induced neutropenia -resolved as of 3/24/2024  Assessment & Plan  Lab Results   Component Value Date    NEUTROABS 0.31 (L) 03/22/2024    NEUTROABS 0.59 (L) 03/21/2024    NEUTROABS 3.46 06/17/2021     Resolved with Filgrastim, WBC and ANC in normal range.     Plan:  D5/5 Filgrastrim 300 mcg daily  Discontinued neutropenic precautions  Continue antibiotics as above  Appreciate Hematology/Oncology input- Out pt follow up with his oncologist.     Mucositis after therapy  Assessment & Plan  POA: He received cycle #2 under the supervision of Dr. Rashid at [facility], today day #5 admitted to the hospital with fatigue, diarrhea, mucositis, pain in the mouth, dysphagia, dizziness. Oral pain improving.    Plan:  Continue Magic mouth wash  Continue antibiotics as above  Possible EGD pending GI recommendations, although patient declines in a.m.     Acute diarrhea-resolved as of 3/24/2024  Assessment & Plan  Acute Diarrhea  Abnormal CT chest/abdomen/pelvis: thickening esophagus and thickening colon suspicious for esophagitis and colitis  ESR: Normal  C-reactive protein: 16.3    Plan:  Last BM was on 3/22 night. NO BM since then. Will observe for 24 hrs.   Imodium liquid TID PRN   - PRN anti-emetics  - pantoprazole IV 40mg BID  - carafate ACHS slurry   - nystatin swish and swallow QID and Magic mouth wash  for possible candidal esophagitis with current chemotherapy  -stool enteric pathogens, C.diff negative   Pending calprotectin  - Holding off on EGD today as pt declined.  - diet as tolerated   -GI following     Hyperglycemia  Assessment & Plan  Lab Results   Component Value Date    HGBA1C 10.2 (H) 03/20/2024       Recent Labs     03/23/24  1705 03/23/24  2122 03/24/24  0759 03/24/24  1125   POCGLU 257* 272* 195* 254*       Blood Sugar Average: Last 72 hrs:  (P) [id number]    History of Type 2 DM on metformin and  Camille  Patient presents with two days of worsening abdominal pain, nausea, vomiting and diarrhea  Blood sugar >400, BHB elevated, and anion gap of 17 POA.  VBG/ABG shows alkalotic blood pH, possibly from contraction alkalosis in setting of GI losses  Possibly exacerbated by colitis .  Recent Labs     03/23/24  2122 03/24/24  0759 03/24/24  1125   POCGLU 272* 195* 254*          Plan:  Continue Glargine 15 units QHS and increased mealtime lispro from 4 units to 5 units for better BG control and started on diabetic diet  Continue SSI with Accu-Cheks 4 times daily with meals and at bedtime   Discontinued IVF given tolerating oral intake well  Adjust insulin regimen as needed based on BG levels  Hypoglycemia protocol        Medical Problems       Resolved Problems  Date Reviewed: 3/25/2024            Resolved    * (Principal) Sepsis (HCC) 3/24/2024     Resolved by  Teena Arango MD    Acute diarrhea 3/24/2024     Resolved by  Teena Arango MD    Chemotherapy-induced neutropenia  3/24/2024     Resolved by  Teena Arango MD        Discharging Resident: Hermila Soni MD  Discharging Attending: Elizabeth Ross MD  PCP: No primary care provider on file.  Admission Date:   Admission Orders (From admission, onward)       Ordered        03/19/24 1140  INPATIENT ADMISSION  Once                          Discharge Date: 03/25/24    Consultations During Hospital Stay:  Oncology    Procedures Performed:       Significant Findings / Test Results:   Chest x-ray no acute cardiopulmonary disease:  CT PE study with contrast:No evidence of pulmonary embolism.  2. Mild variable bronchial wall thickening bilaterally. Correlate for bronchial airways disease. No findings for pulmonary infiltrate.  3. Mild/moderate circumferential wall thickening suggested of the entire esophagus suspicious for esophagitis.  4. Mild variable wall thickening of the colon, most notably of the descending and sigmoid colon, possibly involving the  ascending colon. Findings suspicious for colitis.  Incidental Findings: Mildly enlarged prostate.     Test Results Pending at Discharge (will require follow up):  None     Outpatient Tests Requested:  None    Complications: None    Reason for Admission: NVD, abdominal pain     Hospital Course:   Aman Rocha is a 54 y.o. 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 Rashid at [facility], day #5 admitted to the hospital with fatigue, diarrhea, mucositis, pain in the mouth, dysphagia, dizziness   He has CTA of the chest showed no evidence of PE, moderate wall thickening of the esophagus suspicious for esophagitis, thickening of the colon suspicious for colitis and bronchial wall thickening   Glucose 409 creatinine 0.6, BUN 25, hemoglobin 18.4 WBC 5.4 platelets 173,000, lactic acid 2.7, prolactin 0.12   He reported fatigue, diarrhea, unable to stand up, dizziness when he stands up, mouth sores and pain, loss of appetite  During this course of hospitalization his ANC dropped to 0.31, he was started on filgrastim by oncology at the time of discharge, white count of 20 and ANC 16 , thought to be from filgrastim.     GI consulted, Possible Candida esophagitis versus less  likely CMV/HSV esophagitis versus erosive esophagitis versus less likely malignancy. recommends to empirically treat with pantoprazole 40 mg twice daily. started on nystatin swish and swallow and advised to continue for 7 more days along with Magic mouthwash.  GI recommended endoscopy but patient declined.  At the time of discharge, pt is clinically stable. Still has mild mucositis in buccal mucosa and tolerating regular diet.   Advised to follow-up with his oncologist at [facility]  Please see above list of diagnoses and related plan for additional information.     Condition at Discharge: stable    Discharge Day Visit / Exam:   Subjective: Patient still complains of pain in the oral cavity.  Able to tolerate solids and liquids  Vitals: Blood Pressure: 117/74 (03/25/24 0752)  Pulse: 88 (03/25/24 0752)  Temperature: 98.6 °F (37 °C) (03/24/24 2332)  Temp Source: Oral (03/24/24 0800)  Respirations: 18 (03/25/24 0752)  Weight - Scale: 57.2 kg (126 lb 3.2 oz) (03/20/24 1430)  SpO2: 92 % (03/25/24 0752)  Exam:   Physical Exam  Constitutional:       General: He is not in acute distress.     Appearance: He is ill-appearing. He is not toxic-appearing.   HENT:      Head: Normocephalic and atraumatic.      Mouth/Throat:      Mouth: Mucous membranes are moist.      Pharynx: Posterior oropharyngeal erythema present. No oropharyngeal exudate.      Comments: Small ulcers/lesions scattered  Cardiovascular:      Rate and Rhythm: Normal rate and regular rhythm.      Heart sounds: Normal heart sounds.   Pulmonary:      Effort: No respiratory distress.      Breath sounds: Normal breath sounds. No wheezing, rhonchi or rales.   Abdominal:      General: Bowel sounds are normal. There is no distension.      Palpations: Abdomen is soft.      Tenderness: There is no abdominal tenderness.   Musculoskeletal:         General: Normal range of motion.      Cervical back: Neck supple.      Right lower leg: No edema.      Left lower leg: No  edema.   Skin:     General: Skin is warm and dry.      Findings: No lesion or rash.   Neurological:      Mental Status: He is alert and oriented to person, place, and time.   Psychiatric:         Behavior: Behavior normal.          Discussion with Family: Patient declined call to .     Discharge instructions/Information to patient and family:   See after visit summary for information provided to patient and family.      Provisions for Follow-Up Care:  See after visit summary for information related to follow-up care and any pertinent home health orders.      Mobility at time of Discharge:   Basic Mobility Inpatient Raw Score: 18  JH-HLM Goal: 6: Walk 10 steps or more  JH-HLM Achieved: 7: Walk 25 feet or more  HLM Goal achieved. Continue to encourage appropriate mobility.     Disposition:   Home with [facility]    Planned Readmission: NONE    Discharge Medications:  See after visit summary for reconciled discharge medications provided to patient and/or family.      **Please Note: This note may have been constructed using a voice recognition system**

## 2024-03-25 NOTE — PROGRESS NOTES
Patient:    MRN:  [id number]    Omid Request ID:  [id number]    Level of care reserved:  Home Health Agency    Partner Reserved:  [facility], [place] [address] [phone]    Clinical needs requested:    Geography searched:  [id number]    Start of Service:    Request sent:  11:11am EDT on 3/25/2024 by Missy Simon    Partner reserved:  11:32am EDT on 3/25/2024 by Missy Simon    Choice list shared:

## 2024-03-25 NOTE — MALNUTRITION/BMI
This medical record reflects one or more clinical indicators suggestive of malnutrition and/or morbid obesity.    Malnutrition Findings:   Adult Malnutrition type: Chronic illness  Adult Degree of Malnutrition: Malnutrition of moderate degree  Malnutrition Characteristics: Inadequate energy, Weight loss                  360 Statement: Pt with new diagnosis of moderate malnutrition as evidenced by inadequate calorie intakes with subsiquent 5% wt loss x 1 month to be treated with diet but mostly nutritional supplements    BMI Findings:           Body mass index is 25.5 kg/m².     See Nutrition note dated 3/5/024 for additional details.  Completed nutrition assessment is viewable in the nutrition documentation.

## 2024-03-25 NOTE — CASE MANAGEMENT
Case Management Discharge Planning Note    Patient name Aman Rocha  Location S /S -01 MRN [id number]  : 1970 Date 3/25/2024       Current Admission Date: 3/19/2024  Current Admission Diagnosis:Hyperglycemia   Patient Active Problem List    Diagnosis Date Noted    Mucositis after therapy 2024    Hyperglycemia 2024    Nasopharyngeal carcinoma (HCC) 2024    Small bowel obstruction (HCC) 06/10/2021    Type 2 diabetes mellitus, without long-term current use of insulin (HCC) 06/10/2021    Paroxysmal atrial fibrillation (HCC) 06/10/2021    History of CVA (cerebrovascular accident) 06/10/2021    Nicotine abuse 06/10/2021    Diabetes mellitus (HCC)     Hypertension       LOS (days): 6  Geometric Mean LOS (GMLOS) (days): 5.1  Days to GMLOS:-1     OBJECTIVE:  Risk of Unplanned Readmission Score: 18.66         Current admission status: Inpatient   Preferred Pharmacy:   [facility]/pharmacy #[id number] [address] [address]  Phone: [phone] Fax: [phone]    Primary Care Provider: No primary care provider on file.    Primary Insurance: MEDICARE  Secondary Insurance:     DISCHARGE DETAILS:    Discharge planning discussed with:: Patient  Freedom of Choice: Yes  Comments - Freedom of Choice: CM discussed discharge plans per care team recs and patient is interested in [facility].  CM contacted family/caregiver?: No- see comments (Oriented)  Were Treatment Team discharge recommendations reviewed with patient/caregiver?: Yes  Did patient/caregiver verbalize understanding of patient care needs?: Yes  Were patient/caregiver advised of the risks associated with not following Treatment Team discharge recommendations?: Yes    Contacts  Patient Contacts: Aman  Relationship to Patient:: Other (Comment) (Self)  Contact Method: In Person  Reason/Outcome: Continuity of Care, Discharge Planning    Requested Home Health Care         Is the patient  interested in [facility] at discharge?: Yes  Home Health Discipline requested:: Nursing, Occupational Therapy, Physical Therapy  Home Health Agency Name:: [facility]  Home Health Follow-Up Provider:: PCP  Home Health Services Needed:: Gait/ADL Training, Strengthening/Theraputic Exercises to Improve Function, Evaluate Functional Status and Safety  Homebound Criteria Met:: Uses an Assist Device (i.e. cane, walker, etc)  Supporting Clincal Findings:: Limited Endurance         Other Referral/Resources/Interventions Provided:  Interventions: HHC  Referral Comments: [facility]    Would you like to participate in our [facility] service program?  : No - Declined    Treatment Team Recommendation: Home with Home Health Care  Discharge Destination Plan:: Home with Home Health Care  Transport at Discharge : Family           ETA of Transport (Date): 03/25/24  ETA of Transport (Time): 1700     Transfer Mode: Ambulate  Accompanied by: Family member

## 2024-03-25 NOTE — DISCHARGE INSTR - AVS FIRST PAGE
Dear Aman Rocha,     It was our pleasure to care for you here at [facility].  It is our hope that we were always able to exceed the expected standards for your care during your stay.  You were hospitalized due to  radiation induced mucosis its.  You were cared for on the 2 floor by Hermila Soni MD under the service of Elizabeth Ross MD with the [facility] who covers for your primary care physician (PCP), No primary care provider on file., while you were hospitalized.  If you have any questions or concerns related to this hospitalization, you may contact us at .  For follow up as well as any medication refills, we recommend that you follow up with your primary care physician.  A registered nurse will reach out to you by phone within a few days after your discharge to answer any additional questions that you may have after going home.  However, at this time we provide for you here, the most important instructions / recommendations at discharge:     Notable Medication Adjustments -   Please start taking Magic mouth wash, Nystatin swish and swallow and Mouth Kote as needed for oral mucositis.   Continue to take pantoprazole 40 mg tablet twice a day.   Testing Required after Discharge -   None   ** Please contact your PCP to request testing orders for any of the testing recommended here **  Important follow up information -   Follow up with pcp with in 1 week after discharge.   Please call your oncologist and schedule further appointments   Other Instructions -   Please return to hospital if you have similar symptoms of oral pain, abdomen pain and fever.   Please review this entire after visit summary as additional general instructions including medication list, appointments, activity, diet, any pertinent wound care, and other additional recommendations from your care team that may be provided for you.      Sincerely,      Hermila Soni MD

## 2024-03-25 NOTE — PROGRESS NOTES
The pantoprazole has / have been converted to Oral per [facility] IV-to-PO Auto-Conversion Protocol for Adults as approved by the Pharmacy and Therapeutics Committee. The patient met all eligible criteria:    1) Age = 18 years old   2) Received at least one dose of the IV form   3) Receiving at least one other scheduled oral/enteral medication   4) Tolerating an oral/enteral diet     and did not have any exclusions:     1) Critical care patient   2) Active GI bleed (IF assessing H2RAs or PPIs)   3) Continuous tube feeding (IF assessing cipro, doxycycline, levofloxacin, minocycline, rifampin, or voriconazole)   4) Receiving PO vancomycin (IF assessing metronidazole)   5) Persistent nausea and/or vomiting   6) Ileus or gastrointestinal obstruction   7) Mayur/nasogastric tube set for continuous suction   8) Specific order not to automatically convert to PO (in the order's comments or if discussed in the most recent Infectious Disease or primary team's progress notes).

## 2024-03-25 NOTE — PLAN OF CARE
Problem: PAIN - ADULT  Goal: Verbalizes/displays adequate comfort level or baseline comfort level  Description: Interventions:  - Encourage patient to monitor pain and request assistance  - Assess pain using appropriate pain scale  - Administer analgesics based on type and severity of pain and evaluate response  - Implement non-pharmacological measures as appropriate and evaluate response  - Consider cultural and social influences on pain and pain management  - Notify physician/advanced practitioner if interventions unsuccessful or patient reports new pain  Outcome: Progressing     Problem: INFECTION - ADULT  Goal: Absence or prevention of progression during hospitalization  Description: INTERVENTIONS:  - Assess and monitor for signs and symptoms of infection  - Monitor lab/diagnostic results  - Monitor all insertion sites, i.e. indwelling lines, tubes, and drains  - Monitor endotracheal if appropriate and nasal secretions for changes in amount and color  - [place] appropriate cooling/warming therapies per order  - Administer medications as ordered  - Instruct and encourage patient and family to use good hand hygiene technique  - Identify and instruct in appropriate isolation precautions for identified infection/condition  Outcome: Progressing

## 2024-03-26 ENCOUNTER — HOME CARE VISIT (OUTPATIENT)
Dept: HOME HEALTH SERVICES | Facility: HOME HEALTHCARE | Age: 54
End: 2024-03-26

## 2024-03-27 ENCOUNTER — HOME CARE VISIT (OUTPATIENT)
Dept: HOME HEALTH SERVICES | Facility: HOME HEALTHCARE | Age: 54
End: 2024-03-27

## 2024-03-30 ENCOUNTER — HOME CARE VISIT (OUTPATIENT)
Dept: HOME HEALTH SERVICES | Facility: HOME HEALTHCARE | Age: 54
End: 2024-03-30

## 2024-03-30 NOTE — CASE COMMUNICATION
3.30.24  spoke with pt and he is declining SN visit for the weekend due to holiday and is asking for SN visit for SOC on 4.1.24      Pt put on schedule for 4.1.24

## 2024-04-01 ENCOUNTER — HOME CARE VISIT (OUTPATIENT)
Dept: HOME HEALTH SERVICES | Facility: HOME HEALTHCARE | Age: 54
End: 2024-04-01

## 2024-04-02 ENCOUNTER — HOME CARE VISIT (OUTPATIENT)
Dept: HOME HEALTH SERVICES | Facility: HOME HEALTHCARE | Age: 54
End: 2024-04-02

## (undated) DEVICE — PACK PBDS LAP CHOLE RF

## (undated) DEVICE — ADHESIVE SKIN HIGH VISCOSITY EXOFIN 1ML

## (undated) DEVICE — TRAY FOLEY 16FR URIMETER SURESTEP

## (undated) DEVICE — TROCAR: Brand: KII FIOS FIRST ENTRY

## (undated) DEVICE — TROCAR: Brand: KII® SLEEVE

## (undated) DEVICE — ASTOUND STANDARD SURGICAL GOWN, XXL: Brand: CONVERTORS

## (undated) DEVICE — GLOVE SRG BIOGEL 8

## (undated) DEVICE — HARMONIC ACE 5MM DIAMETER SHEARS 36CM SHAFT LENGTH + ADAPTIVE TISSUE TECHNOLOGY FOR USE WITH GENERATOR G11: Brand: HARMONIC ACE

## (undated) DEVICE — STERILE LATEX POWDER-FREE SURGICAL GLOVESWITH NITRILE COATING: Brand: PROTEXIS

## (undated) DEVICE — SEPRA FILM 6 X 5

## (undated) DEVICE — SUT MONOCRYL 4-0 PS-2 18 IN Y496G

## (undated) DEVICE — PLUMEPEN PRO 10FT

## (undated) DEVICE — INTENDED FOR TISSUE SEPARATION, AND OTHER PROCEDURES THAT REQUIRE A SHARP SURGICAL BLADE TO PUNCTURE OR CUT.: Brand: BARD-PARKER SAFETY BLADES SIZE 11, STERILE

## (undated) DEVICE — SUT VICRYL PLUS 0 UR-6 27IN VCP603H